# Patient Record
Sex: MALE | Race: WHITE | NOT HISPANIC OR LATINO | Employment: FULL TIME | URBAN - METROPOLITAN AREA
[De-identification: names, ages, dates, MRNs, and addresses within clinical notes are randomized per-mention and may not be internally consistent; named-entity substitution may affect disease eponyms.]

---

## 2017-02-03 ENCOUNTER — HOSPITAL ENCOUNTER (OUTPATIENT)
Dept: CT IMAGING | Facility: HOSPITAL | Age: 47
Discharge: HOME | End: 2017-02-03
Attending: INTERNAL MEDICINE | Admitting: INTERNAL MEDICINE

## 2017-02-08 ENCOUNTER — HOSPITAL ENCOUNTER (OUTPATIENT)
Dept: WOUND CARE | Facility: HOSPITAL | Age: 47
Discharge: HOME | End: 2017-02-08
Attending: SURGERY | Admitting: SURGERY

## 2017-02-15 ENCOUNTER — HOSPITAL ENCOUNTER (OUTPATIENT)
Dept: SLEEP MEDICINE | Facility: HOSPITAL | Age: 47
Discharge: HOME | End: 2017-02-15
Attending: INTERNAL MEDICINE | Admitting: INTERNAL MEDICINE

## 2020-01-16 ENCOUNTER — APPOINTMENT (OUTPATIENT)
Dept: GENERAL RADIOLOGY | Facility: HOSPITAL | Age: 50
End: 2020-01-16

## 2020-01-16 ENCOUNTER — HOSPITAL ENCOUNTER (OUTPATIENT)
Facility: HOSPITAL | Age: 50
Setting detail: OBSERVATION
Discharge: HOME | End: 2020-01-19
Attending: INTERNAL MEDICINE | Admitting: INTERNAL MEDICINE

## 2020-01-16 DIAGNOSIS — R50.9 FEVER, UNSPECIFIED FEVER CAUSE: Primary | ICD-10-CM

## 2020-01-16 DIAGNOSIS — L03.116 LEFT LEG CELLULITIS: ICD-10-CM

## 2020-01-16 DIAGNOSIS — D72.829 LEUKOCYTOSIS, UNSPECIFIED TYPE: ICD-10-CM

## 2020-01-16 DIAGNOSIS — Z86.14 HISTORY OF MRSA INFECTION: ICD-10-CM

## 2020-01-16 PROBLEM — J44.9 COPD (CHRONIC OBSTRUCTIVE PULMONARY DISEASE) (HCC): Chronic | Status: ACTIVE | Noted: 2020-01-16

## 2020-01-16 PROBLEM — E55.9 VITAMIN D DEFICIENCY: Status: ACTIVE | Noted: 2017-03-03

## 2020-01-16 PROBLEM — G47.33 OBSTRUCTIVE SLEEP APNEA SYNDROME: Status: ACTIVE | Noted: 2020-01-16

## 2020-01-16 PROBLEM — E11.9 TYPE 2 DIABETES MELLITUS: Chronic | Status: ACTIVE | Noted: 2020-01-16

## 2020-01-16 PROBLEM — I10 BENIGN ESSENTIAL HYPERTENSION: Chronic | Status: ACTIVE | Noted: 2020-01-16

## 2020-01-16 PROBLEM — E55.9 VITAMIN D DEFICIENCY: Chronic | Status: ACTIVE | Noted: 2017-03-03

## 2020-01-16 PROBLEM — E66.9 OBESITY (BMI 30-39.9): Chronic | Status: ACTIVE | Noted: 2020-01-16

## 2020-01-16 PROBLEM — S91.105A OPEN WOUND OF FIFTH TOE OF LEFT FOOT: Status: ACTIVE | Noted: 2020-01-16

## 2020-01-16 PROBLEM — L03.90 CELLULITIS: Status: ACTIVE | Noted: 2020-01-16

## 2020-01-16 PROBLEM — I10 BENIGN ESSENTIAL HYPERTENSION: Status: ACTIVE | Noted: 2020-01-16

## 2020-01-16 PROBLEM — F39 MOOD DISORDER (HCC): Status: ACTIVE | Noted: 2017-03-06

## 2020-01-16 PROBLEM — F39 MOOD DISORDER (HCC): Chronic | Status: ACTIVE | Noted: 2017-03-06

## 2020-01-16 PROBLEM — G47.33 OBSTRUCTIVE SLEEP APNEA SYNDROME: Chronic | Status: ACTIVE | Noted: 2020-01-16

## 2020-01-16 PROBLEM — E11.9 TYPE 2 DIABETES MELLITUS (HCC): Status: ACTIVE | Noted: 2020-01-16

## 2020-01-16 LAB
ALBUMIN SERPL-MCNC: 3.6 G/DL (ref 3.5–5.2)
ALBUMIN/GLOB SERPL: 1.2 G/DL
ALP SERPL-CCNC: 68 U/L (ref 39–117)
ALT SERPL W P-5'-P-CCNC: 13 U/L (ref 1–41)
ANION GAP SERPL CALCULATED.3IONS-SCNC: 11 MMOL/L (ref 5–15)
AST SERPL-CCNC: 17 U/L (ref 1–40)
BASOPHILS # BLD AUTO: 0 10*3/MM3 (ref 0–0.2)
BASOPHILS NFR BLD AUTO: 0 % (ref 0–1.5)
BILIRUB SERPL-MCNC: 1.4 MG/DL (ref 0.2–1.2)
BUN BLD-MCNC: 18 MG/DL (ref 6–20)
BUN/CREAT SERPL: 14.9 (ref 7–25)
CALCIUM SPEC-SCNC: 8.6 MG/DL (ref 8.6–10.5)
CHLORIDE SERPL-SCNC: 98 MMOL/L (ref 98–107)
CO2 SERPL-SCNC: 26 MMOL/L (ref 22–29)
CREAT BLD-MCNC: 1.21 MG/DL (ref 0.76–1.27)
CRP SERPL-MCNC: 13.32 MG/DL (ref 0–0.5)
D-LACTATE SERPL-SCNC: 0.9 MMOL/L (ref 0.5–2)
D-LACTATE SERPL-SCNC: 1.7 MMOL/L (ref 0.5–2)
DEPRECATED RDW RBC AUTO: 43.8 FL (ref 37–54)
EOSINOPHIL # BLD AUTO: 0 10*3/MM3 (ref 0–0.4)
EOSINOPHIL NFR BLD AUTO: 0 % (ref 0.3–6.2)
ERYTHROCYTE [DISTWIDTH] IN BLOOD BY AUTOMATED COUNT: 15 % (ref 12.3–15.4)
ERYTHROCYTE [SEDIMENTATION RATE] IN BLOOD: 40 MM/HR (ref 0–15)
FLUAV SUBTYP SPEC NAA+PROBE: NOT DETECTED
FLUBV RNA ISLT QL NAA+PROBE: NOT DETECTED
GFR SERPL CREATININE-BSD FRML MDRD: 64 ML/MIN/1.73
GLOBULIN UR ELPH-MCNC: 2.9 GM/DL
GLUCOSE BLD-MCNC: 201 MG/DL (ref 65–99)
GLUCOSE BLDC GLUCOMTR-MCNC: 273 MG/DL (ref 70–105)
HCT VFR BLD AUTO: 43.4 % (ref 37.5–51)
HGB BLD-MCNC: 14.6 G/DL (ref 13–17.7)
HOLD SPECIMEN: NORMAL
HOLD SPECIMEN: NORMAL
LYMPHOCYTES # BLD AUTO: 0.8 10*3/MM3 (ref 0.7–3.1)
LYMPHOCYTES NFR BLD AUTO: 4.5 % (ref 19.6–45.3)
MCH RBC QN AUTO: 27.4 PG (ref 26.6–33)
MCHC RBC AUTO-ENTMCNC: 33.6 G/DL (ref 31.5–35.7)
MCV RBC AUTO: 81.5 FL (ref 79–97)
MONOCYTES # BLD AUTO: 0.9 10*3/MM3 (ref 0.1–0.9)
MONOCYTES NFR BLD AUTO: 5.2 % (ref 5–12)
NEUTROPHILS # BLD AUTO: 15.3 10*3/MM3 (ref 1.7–7)
NEUTROPHILS NFR BLD AUTO: 90.3 % (ref 42.7–76)
NRBC BLD AUTO-RTO: 0.2 /100 WBC (ref 0–0.2)
PLATELET # BLD AUTO: 171 10*3/MM3 (ref 140–450)
PMV BLD AUTO: 8.2 FL (ref 6–12)
POTASSIUM BLD-SCNC: 4.5 MMOL/L (ref 3.5–5.2)
PROT SERPL-MCNC: 6.5 G/DL (ref 6–8.5)
RBC # BLD AUTO: 5.32 10*6/MM3 (ref 4.14–5.8)
SODIUM BLD-SCNC: 135 MMOL/L (ref 136–145)
WBC NRBC COR # BLD: 16.9 10*3/MM3 (ref 3.4–10.8)
WHOLE BLOOD HOLD SPECIMEN: NORMAL
WHOLE BLOOD HOLD SPECIMEN: NORMAL

## 2020-01-16 PROCEDURE — 82962 GLUCOSE BLOOD TEST: CPT

## 2020-01-16 PROCEDURE — 99219 PR INITIAL OBSERVATION CARE/DAY 50 MINUTES: CPT | Performed by: INTERNAL MEDICINE

## 2020-01-16 PROCEDURE — 96367 TX/PROPH/DG ADDL SEQ IV INF: CPT

## 2020-01-16 PROCEDURE — 86140 C-REACTIVE PROTEIN: CPT | Performed by: NURSE PRACTITIONER

## 2020-01-16 PROCEDURE — 85652 RBC SED RATE AUTOMATED: CPT | Performed by: NURSE PRACTITIONER

## 2020-01-16 PROCEDURE — 80053 COMPREHEN METABOLIC PANEL: CPT | Performed by: NURSE PRACTITIONER

## 2020-01-16 PROCEDURE — 93005 ELECTROCARDIOGRAM TRACING: CPT | Performed by: NURSE PRACTITIONER

## 2020-01-16 PROCEDURE — 83605 ASSAY OF LACTIC ACID: CPT

## 2020-01-16 PROCEDURE — G0378 HOSPITAL OBSERVATION PER HR: HCPCS

## 2020-01-16 PROCEDURE — 25010000002 ONDANSETRON PER 1 MG: Performed by: NURSE PRACTITIONER

## 2020-01-16 PROCEDURE — 71045 X-RAY EXAM CHEST 1 VIEW: CPT

## 2020-01-16 PROCEDURE — 83605 ASSAY OF LACTIC ACID: CPT | Performed by: NURSE PRACTITIONER

## 2020-01-16 PROCEDURE — 96375 TX/PRO/DX INJ NEW DRUG ADDON: CPT

## 2020-01-16 PROCEDURE — 36415 COLL VENOUS BLD VENIPUNCTURE: CPT

## 2020-01-16 PROCEDURE — 87502 INFLUENZA DNA AMP PROBE: CPT | Performed by: NURSE PRACTITIONER

## 2020-01-16 PROCEDURE — 99284 EMERGENCY DEPT VISIT MOD MDM: CPT

## 2020-01-16 PROCEDURE — 87040 BLOOD CULTURE FOR BACTERIA: CPT | Performed by: NURSE PRACTITIONER

## 2020-01-16 PROCEDURE — 73502 X-RAY EXAM HIP UNI 2-3 VIEWS: CPT

## 2020-01-16 PROCEDURE — 63710000001 INSULIN LISPRO (HUMAN) PER 5 UNITS: Performed by: NURSE PRACTITIONER

## 2020-01-16 PROCEDURE — 25010000002 MORPHINE PER 10 MG: Performed by: NURSE PRACTITIONER

## 2020-01-16 PROCEDURE — 85025 COMPLETE CBC W/AUTO DIFF WBC: CPT | Performed by: NURSE PRACTITIONER

## 2020-01-16 PROCEDURE — 83036 HEMOGLOBIN GLYCOSYLATED A1C: CPT | Performed by: NURSE PRACTITIONER

## 2020-01-16 PROCEDURE — 83735 ASSAY OF MAGNESIUM: CPT | Performed by: NURSE PRACTITIONER

## 2020-01-16 PROCEDURE — 25010000002 VANCOMYCIN 10 G RECONSTITUTED SOLUTION: Performed by: NURSE PRACTITIONER

## 2020-01-16 PROCEDURE — 25010000002 PIPERACILLIN SOD-TAZOBACTAM PER 1 G: Performed by: NURSE PRACTITIONER

## 2020-01-16 PROCEDURE — 96365 THER/PROPH/DIAG IV INF INIT: CPT

## 2020-01-16 RX ORDER — BUDESONIDE AND FORMOTEROL FUMARATE DIHYDRATE 160; 4.5 UG/1; UG/1
1 AEROSOL RESPIRATORY (INHALATION) 2 TIMES DAILY PRN
Status: DISCONTINUED | OUTPATIENT
Start: 2020-01-16 | End: 2020-01-17

## 2020-01-16 RX ORDER — ONDANSETRON 2 MG/ML
4 INJECTION INTRAMUSCULAR; INTRAVENOUS ONCE
Status: COMPLETED | OUTPATIENT
Start: 2020-01-16 | End: 2020-01-16

## 2020-01-16 RX ORDER — BUDESONIDE AND FORMOTEROL FUMARATE DIHYDRATE 160; 4.5 UG/1; UG/1
1 AEROSOL RESPIRATORY (INHALATION) 2 TIMES DAILY PRN
COMMUNITY
Start: 2016-05-19

## 2020-01-16 RX ORDER — SODIUM CHLORIDE 0.9 % (FLUSH) 0.9 %
10 SYRINGE (ML) INJECTION AS NEEDED
Status: DISCONTINUED | OUTPATIENT
Start: 2020-01-16 | End: 2020-01-19 | Stop reason: HOSPADM

## 2020-01-16 RX ORDER — NICOTINE POLACRILEX 4 MG
15 LOZENGE BUCCAL
Status: DISCONTINUED | OUTPATIENT
Start: 2020-01-16 | End: 2020-01-19 | Stop reason: HOSPADM

## 2020-01-16 RX ORDER — MONTELUKAST SODIUM 10 MG/1
10 TABLET ORAL DAILY
COMMUNITY
Start: 2014-06-27 | End: 2020-01-16

## 2020-01-16 RX ORDER — ACETAMINOPHEN 325 MG/1
650 TABLET ORAL EVERY 4 HOURS PRN
Status: DISCONTINUED | OUTPATIENT
Start: 2020-01-16 | End: 2020-01-19 | Stop reason: HOSPADM

## 2020-01-16 RX ORDER — VANCOMYCIN HYDROCHLORIDE
15 EVERY 12 HOURS
Status: DISCONTINUED | OUTPATIENT
Start: 2020-01-17 | End: 2020-01-16

## 2020-01-16 RX ORDER — BISACODYL 10 MG
10 SUPPOSITORY, RECTAL RECTAL DAILY PRN
Status: DISCONTINUED | OUTPATIENT
Start: 2020-01-16 | End: 2020-01-19 | Stop reason: HOSPADM

## 2020-01-16 RX ORDER — ALUMINA, MAGNESIA, AND SIMETHICONE 2400; 2400; 240 MG/30ML; MG/30ML; MG/30ML
15 SUSPENSION ORAL EVERY 6 HOURS PRN
Status: DISCONTINUED | OUTPATIENT
Start: 2020-01-16 | End: 2020-01-19 | Stop reason: HOSPADM

## 2020-01-16 RX ORDER — ONDANSETRON 2 MG/ML
4 INJECTION INTRAMUSCULAR; INTRAVENOUS EVERY 6 HOURS PRN
Status: DISCONTINUED | OUTPATIENT
Start: 2020-01-16 | End: 2020-01-19 | Stop reason: HOSPADM

## 2020-01-16 RX ORDER — SODIUM CHLORIDE 0.9 % (FLUSH) 0.9 %
10 SYRINGE (ML) INJECTION EVERY 12 HOURS SCHEDULED
Status: DISCONTINUED | OUTPATIENT
Start: 2020-01-16 | End: 2020-01-19 | Stop reason: HOSPADM

## 2020-01-16 RX ORDER — MODAFINIL 100 MG/1
100 TABLET ORAL DAILY
COMMUNITY

## 2020-01-16 RX ORDER — MAGNESIUM SULFATE 1 G/100ML
1 INJECTION INTRAVENOUS AS NEEDED
Status: DISCONTINUED | OUTPATIENT
Start: 2020-01-16 | End: 2020-01-19 | Stop reason: HOSPADM

## 2020-01-16 RX ORDER — MORPHINE SULFATE 4 MG/ML
4 INJECTION, SOLUTION INTRAMUSCULAR; INTRAVENOUS ONCE
Status: COMPLETED | OUTPATIENT
Start: 2020-01-16 | End: 2020-01-16

## 2020-01-16 RX ORDER — LISINOPRIL AND HYDROCHLOROTHIAZIDE 12.5; 1 MG/1; MG/1
1 TABLET ORAL DAILY
COMMUNITY
End: 2022-01-03

## 2020-01-16 RX ORDER — POTASSIUM CHLORIDE 20 MEQ/1
40 TABLET, EXTENDED RELEASE ORAL AS NEEDED
Status: DISCONTINUED | OUTPATIENT
Start: 2020-01-16 | End: 2020-01-19 | Stop reason: HOSPADM

## 2020-01-16 RX ORDER — VANCOMYCIN 1.75 GRAM/500 ML IN 0.9 % SODIUM CHLORIDE INTRAVENOUS
1750 EVERY 12 HOURS
Status: DISCONTINUED | OUTPATIENT
Start: 2020-01-16 | End: 2020-01-18

## 2020-01-16 RX ORDER — DEXTROSE MONOHYDRATE 25 G/50ML
25 INJECTION, SOLUTION INTRAVENOUS
Status: DISCONTINUED | OUTPATIENT
Start: 2020-01-16 | End: 2020-01-19 | Stop reason: HOSPADM

## 2020-01-16 RX ORDER — ACETAMINOPHEN 650 MG/1
650 SUPPOSITORY RECTAL EVERY 4 HOURS PRN
Status: DISCONTINUED | OUTPATIENT
Start: 2020-01-16 | End: 2020-01-19 | Stop reason: HOSPADM

## 2020-01-16 RX ORDER — ONDANSETRON 4 MG/1
4 TABLET, FILM COATED ORAL EVERY 6 HOURS PRN
Status: DISCONTINUED | OUTPATIENT
Start: 2020-01-16 | End: 2020-01-19 | Stop reason: HOSPADM

## 2020-01-16 RX ORDER — ACETAMINOPHEN, ASPIRIN AND CAFFEINE 250; 250; 65 MG/1; MG/1; MG/1
2 TABLET, FILM COATED ORAL EVERY 6 HOURS PRN
COMMUNITY

## 2020-01-16 RX ORDER — MODAFINIL 100 MG/1
100 TABLET ORAL DAILY
Status: DISCONTINUED | OUTPATIENT
Start: 2020-01-17 | End: 2020-01-19 | Stop reason: HOSPADM

## 2020-01-16 RX ORDER — ACETAMINOPHEN 160 MG/5ML
650 SOLUTION ORAL EVERY 4 HOURS PRN
Status: DISCONTINUED | OUTPATIENT
Start: 2020-01-16 | End: 2020-01-19 | Stop reason: HOSPADM

## 2020-01-16 RX ORDER — HYDROCODONE BITARTRATE AND ACETAMINOPHEN 5; 325 MG/1; MG/1
1 TABLET ORAL EVERY 6 HOURS PRN
Status: DISCONTINUED | OUTPATIENT
Start: 2020-01-16 | End: 2020-01-19 | Stop reason: HOSPADM

## 2020-01-16 RX ORDER — MAGNESIUM SULFATE HEPTAHYDRATE 40 MG/ML
2 INJECTION, SOLUTION INTRAVENOUS AS NEEDED
Status: DISCONTINUED | OUTPATIENT
Start: 2020-01-16 | End: 2020-01-19 | Stop reason: HOSPADM

## 2020-01-16 RX ORDER — CHOLECALCIFEROL (VITAMIN D3) 125 MCG
5 CAPSULE ORAL NIGHTLY PRN
Status: DISCONTINUED | OUTPATIENT
Start: 2020-01-16 | End: 2020-01-19 | Stop reason: HOSPADM

## 2020-01-16 RX ADMIN — MORPHINE SULFATE 4 MG: 4 INJECTION INTRAVENOUS at 17:07

## 2020-01-16 RX ADMIN — VANCOMYCIN HYDROCHLORIDE 1750 MG: 10 INJECTION, POWDER, LYOPHILIZED, FOR SOLUTION INTRAVENOUS at 17:10

## 2020-01-16 RX ADMIN — INSULIN LISPRO 6 UNITS: 100 INJECTION, SOLUTION INTRAVENOUS; SUBCUTANEOUS at 21:00

## 2020-01-16 RX ADMIN — PIPERACILLIN AND TAZOBACTAM 3.38 G: 3; .375 INJECTION, POWDER, LYOPHILIZED, FOR SOLUTION INTRAVENOUS at 15:02

## 2020-01-16 RX ADMIN — ONDANSETRON 4 MG: 2 INJECTION INTRAMUSCULAR; INTRAVENOUS at 14:27

## 2020-01-16 RX ADMIN — Medication 10 ML: at 20:43

## 2020-01-16 RX ADMIN — SODIUM CHLORIDE 1000 ML: 900 INJECTION, SOLUTION INTRAVENOUS at 14:26

## 2020-01-16 RX ADMIN — ACETAMINOPHEN 650 MG: 325 TABLET, FILM COATED ORAL at 19:44

## 2020-01-17 ENCOUNTER — APPOINTMENT (OUTPATIENT)
Dept: MRI IMAGING | Facility: HOSPITAL | Age: 50
End: 2020-01-17

## 2020-01-17 LAB
ANION GAP SERPL CALCULATED.3IONS-SCNC: 11 MMOL/L (ref 5–15)
BASOPHILS # BLD AUTO: 0.1 10*3/MM3 (ref 0–0.2)
BASOPHILS NFR BLD AUTO: 0.5 % (ref 0–1.5)
BUN BLD-MCNC: 20 MG/DL (ref 6–20)
BUN/CREAT SERPL: 15.6 (ref 7–25)
CALCIUM SPEC-SCNC: 8.9 MG/DL (ref 8.6–10.5)
CHLORIDE SERPL-SCNC: 101 MMOL/L (ref 98–107)
CO2 SERPL-SCNC: 27 MMOL/L (ref 22–29)
CREAT BLD-MCNC: 1.28 MG/DL (ref 0.76–1.27)
DEPRECATED RDW RBC AUTO: 43.8 FL (ref 37–54)
EOSINOPHIL # BLD AUTO: 0.1 10*3/MM3 (ref 0–0.4)
EOSINOPHIL NFR BLD AUTO: 0.7 % (ref 0.3–6.2)
ERYTHROCYTE [DISTWIDTH] IN BLOOD BY AUTOMATED COUNT: 15.1 % (ref 12.3–15.4)
GFR SERPL CREATININE-BSD FRML MDRD: 60 ML/MIN/1.73
GLUCOSE BLD-MCNC: 173 MG/DL (ref 65–99)
GLUCOSE BLDC GLUCOMTR-MCNC: 182 MG/DL (ref 70–105)
GLUCOSE BLDC GLUCOMTR-MCNC: 237 MG/DL (ref 70–105)
GLUCOSE BLDC GLUCOMTR-MCNC: 263 MG/DL (ref 70–105)
HBA1C MFR BLD: 8 % (ref 3.5–5.6)
HCT VFR BLD AUTO: 40.4 % (ref 37.5–51)
HGB BLD-MCNC: 13.5 G/DL (ref 13–17.7)
LYMPHOCYTES # BLD AUTO: 1 10*3/MM3 (ref 0.7–3.1)
LYMPHOCYTES NFR BLD AUTO: 8.3 % (ref 19.6–45.3)
MAGNESIUM SERPL-MCNC: 1.5 MG/DL (ref 1.6–2.6)
MCH RBC QN AUTO: 27.3 PG (ref 26.6–33)
MCHC RBC AUTO-ENTMCNC: 33.4 G/DL (ref 31.5–35.7)
MCV RBC AUTO: 81.9 FL (ref 79–97)
MONOCYTES # BLD AUTO: 1.4 10*3/MM3 (ref 0.1–0.9)
MONOCYTES NFR BLD AUTO: 11 % (ref 5–12)
NEUTROPHILS # BLD AUTO: 9.8 10*3/MM3 (ref 1.7–7)
NEUTROPHILS NFR BLD AUTO: 79.5 % (ref 42.7–76)
NRBC BLD AUTO-RTO: 0.1 /100 WBC (ref 0–0.2)
PLATELET # BLD AUTO: 143 10*3/MM3 (ref 140–450)
PMV BLD AUTO: 8.2 FL (ref 6–12)
POTASSIUM BLD-SCNC: 4.6 MMOL/L (ref 3.5–5.2)
RBC # BLD AUTO: 4.93 10*6/MM3 (ref 4.14–5.8)
SODIUM BLD-SCNC: 139 MMOL/L (ref 136–145)
TROPONIN T SERPL-MCNC: <0.01 NG/ML (ref 0–0.03)
WBC NRBC COR # BLD: 12.3 10*3/MM3 (ref 3.4–10.8)

## 2020-01-17 PROCEDURE — 94799 UNLISTED PULMONARY SVC/PX: CPT

## 2020-01-17 PROCEDURE — A9576 INJ PROHANCE MULTIPACK: HCPCS | Performed by: INTERNAL MEDICINE

## 2020-01-17 PROCEDURE — 99225 PR SBSQ OBSERVATION CARE/DAY 25 MINUTES: CPT | Performed by: INTERNAL MEDICINE

## 2020-01-17 PROCEDURE — 94664 DEMO&/EVAL PT USE INHALER: CPT

## 2020-01-17 PROCEDURE — 94640 AIRWAY INHALATION TREATMENT: CPT

## 2020-01-17 PROCEDURE — 82962 GLUCOSE BLOOD TEST: CPT

## 2020-01-17 PROCEDURE — 84484 ASSAY OF TROPONIN QUANT: CPT | Performed by: INTERNAL MEDICINE

## 2020-01-17 PROCEDURE — 85025 COMPLETE CBC W/AUTO DIFF WBC: CPT | Performed by: NURSE PRACTITIONER

## 2020-01-17 PROCEDURE — 25010000002 VANCOMYCIN 10 G RECONSTITUTED SOLUTION: Performed by: NURSE PRACTITIONER

## 2020-01-17 PROCEDURE — 25010000002 CEFEPIME PER 500 MG: Performed by: INTERNAL MEDICINE

## 2020-01-17 PROCEDURE — 80048 BASIC METABOLIC PNL TOTAL CA: CPT | Performed by: NURSE PRACTITIONER

## 2020-01-17 PROCEDURE — G0378 HOSPITAL OBSERVATION PER HR: HCPCS

## 2020-01-17 PROCEDURE — 73720 MRI LWR EXTREMITY W/O&W/DYE: CPT

## 2020-01-17 PROCEDURE — 25010000002 GADOTERIDOL PER 1 ML: Performed by: INTERNAL MEDICINE

## 2020-01-17 PROCEDURE — 63710000001 INSULIN GLARGINE PER 5 UNITS: Performed by: INTERNAL MEDICINE

## 2020-01-17 PROCEDURE — 63710000001 INSULIN LISPRO (HUMAN) PER 5 UNITS: Performed by: NURSE PRACTITIONER

## 2020-01-17 PROCEDURE — 25010000002 ENOXAPARIN PER 10 MG: Performed by: NURSE PRACTITIONER

## 2020-01-17 PROCEDURE — 0099U HC BIOFIRE FILMARRAY RESP PANEL 1: CPT | Performed by: NURSE PRACTITIONER

## 2020-01-17 PROCEDURE — 96372 THER/PROPH/DIAG INJ SC/IM: CPT

## 2020-01-17 PROCEDURE — 96367 TX/PROPH/DG ADDL SEQ IV INF: CPT

## 2020-01-17 RX ORDER — LISINOPRIL 5 MG/1
10 TABLET ORAL
Status: DISCONTINUED | OUTPATIENT
Start: 2020-01-18 | End: 2020-01-19 | Stop reason: HOSPADM

## 2020-01-17 RX ORDER — BUDESONIDE AND FORMOTEROL FUMARATE DIHYDRATE 160; 4.5 UG/1; UG/1
2 AEROSOL RESPIRATORY (INHALATION)
Status: DISCONTINUED | OUTPATIENT
Start: 2020-01-17 | End: 2020-01-19 | Stop reason: HOSPADM

## 2020-01-17 RX ORDER — CLINDAMYCIN PHOSPHATE 900 MG/50ML
900 INJECTION, SOLUTION INTRAVENOUS EVERY 8 HOURS
Status: DISCONTINUED | OUTPATIENT
Start: 2020-01-17 | End: 2020-01-19

## 2020-01-17 RX ORDER — INSULIN GLARGINE 100 [IU]/ML
6 INJECTION, SOLUTION SUBCUTANEOUS NIGHTLY
Status: DISCONTINUED | OUTPATIENT
Start: 2020-01-17 | End: 2020-01-19 | Stop reason: HOSPADM

## 2020-01-17 RX ADMIN — BUDESONIDE AND FORMOTEROL FUMARATE DIHYDRATE 2 PUFF: 160; 4.5 AEROSOL RESPIRATORY (INHALATION) at 10:09

## 2020-01-17 RX ADMIN — HYDROCODONE BITARTRATE AND ACETAMINOPHEN 1 TABLET: 5; 325 TABLET ORAL at 19:05

## 2020-01-17 RX ADMIN — MODAFINIL 100 MG: 100 TABLET ORAL at 08:26

## 2020-01-17 RX ADMIN — LISINOPRIL: 20 TABLET ORAL at 08:26

## 2020-01-17 RX ADMIN — GADOTERIDOL 20 ML: 279.3 INJECTION, SOLUTION INTRAVENOUS at 12:00

## 2020-01-17 RX ADMIN — HYDROCODONE BITARTRATE AND ACETAMINOPHEN 1 TABLET: 5; 325 TABLET ORAL at 13:27

## 2020-01-17 RX ADMIN — Medication 10 ML: at 20:31

## 2020-01-17 RX ADMIN — INSULIN LISPRO 8 UNITS: 100 INJECTION, SOLUTION INTRAVENOUS; SUBCUTANEOUS at 18:13

## 2020-01-17 RX ADMIN — ENOXAPARIN SODIUM 40 MG: 40 INJECTION SUBCUTANEOUS at 17:33

## 2020-01-17 RX ADMIN — INSULIN LISPRO 2 UNITS: 100 INJECTION, SOLUTION INTRAVENOUS; SUBCUTANEOUS at 13:15

## 2020-01-17 RX ADMIN — INSULIN GLARGINE 6 UNITS: 100 INJECTION, SOLUTION SUBCUTANEOUS at 20:29

## 2020-01-17 RX ADMIN — VANCOMYCIN HYDROCHLORIDE 1750 MG: 10 INJECTION, POWDER, LYOPHILIZED, FOR SOLUTION INTRAVENOUS at 05:59

## 2020-01-17 RX ADMIN — Medication 10 ML: at 08:26

## 2020-01-17 RX ADMIN — INSULIN LISPRO 6 UNITS: 100 INJECTION, SOLUTION INTRAVENOUS; SUBCUTANEOUS at 20:29

## 2020-01-17 RX ADMIN — BUDESONIDE AND FORMOTEROL FUMARATE DIHYDRATE 2 PUFF: 160; 4.5 AEROSOL RESPIRATORY (INHALATION) at 19:09

## 2020-01-17 RX ADMIN — VANCOMYCIN HYDROCHLORIDE 1750 MG: 10 INJECTION, POWDER, LYOPHILIZED, FOR SOLUTION INTRAVENOUS at 19:01

## 2020-01-17 RX ADMIN — CEFEPIME HYDROCHLORIDE 2 G: 2 INJECTION, POWDER, FOR SOLUTION INTRAVENOUS at 13:15

## 2020-01-17 RX ADMIN — INSULIN LISPRO 4 UNITS: 100 INJECTION, SOLUTION INTRAVENOUS; SUBCUTANEOUS at 08:25

## 2020-01-17 RX ADMIN — HYDROCODONE BITARTRATE AND ACETAMINOPHEN 1 TABLET: 5; 325 TABLET ORAL at 06:03

## 2020-01-17 RX ADMIN — CLINDAMYCIN PHOSPHATE 900 MG: 900 INJECTION, SOLUTION INTRAVENOUS at 21:18

## 2020-01-18 ENCOUNTER — APPOINTMENT (OUTPATIENT)
Dept: CARDIOLOGY | Facility: HOSPITAL | Age: 50
End: 2020-01-18

## 2020-01-18 LAB
ANION GAP SERPL CALCULATED.3IONS-SCNC: 9 MMOL/L (ref 5–15)
B PERT DNA SPEC QL NAA+PROBE: NOT DETECTED
BASOPHILS # BLD AUTO: 0.1 10*3/MM3 (ref 0–0.2)
BASOPHILS NFR BLD AUTO: 1 % (ref 0–1.5)
BH CV LOWER VASCULAR LEFT COMMON FEMORAL AUGMENT: NORMAL
BH CV LOWER VASCULAR LEFT COMMON FEMORAL COMPETENT: NORMAL
BH CV LOWER VASCULAR LEFT COMMON FEMORAL COMPRESS: NORMAL
BH CV LOWER VASCULAR LEFT COMMON FEMORAL PHASIC: NORMAL
BH CV LOWER VASCULAR LEFT COMMON FEMORAL SPONT: NORMAL
BH CV LOWER VASCULAR LEFT DISTAL FEMORAL COMPRESS: NORMAL
BH CV LOWER VASCULAR LEFT GASTRONEMIUS COMPRESS: NORMAL
BH CV LOWER VASCULAR LEFT GREATER SAPH AK COMPRESS: NORMAL
BH CV LOWER VASCULAR LEFT GREATER SAPH BK COMPRESS: NORMAL
BH CV LOWER VASCULAR LEFT LESSER SAPH COMPRESS: NORMAL
BH CV LOWER VASCULAR LEFT MID FEMORAL AUGMENT: NORMAL
BH CV LOWER VASCULAR LEFT MID FEMORAL COMPETENT: NORMAL
BH CV LOWER VASCULAR LEFT MID FEMORAL COMPRESS: NORMAL
BH CV LOWER VASCULAR LEFT MID FEMORAL PHASIC: NORMAL
BH CV LOWER VASCULAR LEFT MID FEMORAL SPONT: NORMAL
BH CV LOWER VASCULAR LEFT PERONEAL COMPRESS: NORMAL
BH CV LOWER VASCULAR LEFT POPLITEAL AUGMENT: NORMAL
BH CV LOWER VASCULAR LEFT POPLITEAL COMPETENT: NORMAL
BH CV LOWER VASCULAR LEFT POPLITEAL COMPRESS: NORMAL
BH CV LOWER VASCULAR LEFT POPLITEAL PHASIC: NORMAL
BH CV LOWER VASCULAR LEFT POPLITEAL SPONT: NORMAL
BH CV LOWER VASCULAR LEFT POSTERIOR TIBIAL COMPRESS: NORMAL
BH CV LOWER VASCULAR LEFT PROXIMAL FEMORAL COMPRESS: NORMAL
BH CV LOWER VASCULAR LEFT SAPHENOFEMORAL JUNCTION AUGMENT: NORMAL
BH CV LOWER VASCULAR LEFT SAPHENOFEMORAL JUNCTION COMPETENT: NORMAL
BH CV LOWER VASCULAR LEFT SAPHENOFEMORAL JUNCTION COMPRESS: NORMAL
BH CV LOWER VASCULAR LEFT SAPHENOFEMORAL JUNCTION PHASIC: NORMAL
BH CV LOWER VASCULAR LEFT SAPHENOFEMORAL JUNCTION SPONT: NORMAL
BH CV LOWER VASCULAR RIGHT COMMON FEMORAL AUGMENT: NORMAL
BH CV LOWER VASCULAR RIGHT COMMON FEMORAL COMPETENT: NORMAL
BH CV LOWER VASCULAR RIGHT COMMON FEMORAL COMPRESS: NORMAL
BH CV LOWER VASCULAR RIGHT COMMON FEMORAL PHASIC: NORMAL
BH CV LOWER VASCULAR RIGHT COMMON FEMORAL SPONT: NORMAL
BUN BLD-MCNC: 19 MG/DL (ref 6–20)
BUN/CREAT SERPL: 15.4 (ref 7–25)
C PNEUM DNA NPH QL NAA+NON-PROBE: NOT DETECTED
CALCIUM SPEC-SCNC: 8.8 MG/DL (ref 8.6–10.5)
CHLORIDE SERPL-SCNC: 103 MMOL/L (ref 98–107)
CO2 SERPL-SCNC: 26 MMOL/L (ref 22–29)
CREAT BLD-MCNC: 1.23 MG/DL (ref 0.76–1.27)
DEPRECATED RDW RBC AUTO: 43.8 FL (ref 37–54)
EOSINOPHIL # BLD AUTO: 0.4 10*3/MM3 (ref 0–0.4)
EOSINOPHIL NFR BLD AUTO: 4.3 % (ref 0.3–6.2)
ERYTHROCYTE [DISTWIDTH] IN BLOOD BY AUTOMATED COUNT: 14.9 % (ref 12.3–15.4)
FLUAV H1 2009 PAND RNA NPH QL NAA+PROBE: NOT DETECTED
FLUAV H1 HA GENE NPH QL NAA+PROBE: NOT DETECTED
FLUAV H3 RNA NPH QL NAA+PROBE: NOT DETECTED
FLUAV SUBTYP SPEC NAA+PROBE: NOT DETECTED
FLUBV RNA ISLT QL NAA+PROBE: NOT DETECTED
GFR SERPL CREATININE-BSD FRML MDRD: 63 ML/MIN/1.73
GLUCOSE BLD-MCNC: 249 MG/DL (ref 65–99)
GLUCOSE BLDC GLUCOMTR-MCNC: 202 MG/DL (ref 70–105)
GLUCOSE BLDC GLUCOMTR-MCNC: 224 MG/DL (ref 70–105)
GLUCOSE BLDC GLUCOMTR-MCNC: 250 MG/DL (ref 70–105)
GLUCOSE BLDC GLUCOMTR-MCNC: 304 MG/DL (ref 70–105)
HADV DNA SPEC NAA+PROBE: NOT DETECTED
HCOV 229E RNA SPEC QL NAA+PROBE: NOT DETECTED
HCOV HKU1 RNA SPEC QL NAA+PROBE: NOT DETECTED
HCOV NL63 RNA SPEC QL NAA+PROBE: DETECTED
HCOV OC43 RNA SPEC QL NAA+PROBE: NOT DETECTED
HCT VFR BLD AUTO: 37.9 % (ref 37.5–51)
HGB BLD-MCNC: 13.2 G/DL (ref 13–17.7)
HMPV RNA NPH QL NAA+NON-PROBE: NOT DETECTED
HPIV1 RNA SPEC QL NAA+PROBE: NOT DETECTED
HPIV2 RNA SPEC QL NAA+PROBE: NOT DETECTED
HPIV3 RNA NPH QL NAA+PROBE: NOT DETECTED
HPIV4 P GENE NPH QL NAA+PROBE: NOT DETECTED
LYMPHOCYTES # BLD AUTO: 1.7 10*3/MM3 (ref 0.7–3.1)
LYMPHOCYTES NFR BLD AUTO: 16.5 % (ref 19.6–45.3)
M PNEUMO IGG SER IA-ACNC: NOT DETECTED
MAGNESIUM SERPL-MCNC: 2 MG/DL (ref 1.6–2.6)
MCH RBC QN AUTO: 28.7 PG (ref 26.6–33)
MCHC RBC AUTO-ENTMCNC: 34.7 G/DL (ref 31.5–35.7)
MCV RBC AUTO: 82.7 FL (ref 79–97)
MONOCYTES # BLD AUTO: 1.6 10*3/MM3 (ref 0.1–0.9)
MONOCYTES NFR BLD AUTO: 15.3 % (ref 5–12)
NEUTROPHILS # BLD AUTO: 6.4 10*3/MM3 (ref 1.7–7)
NEUTROPHILS NFR BLD AUTO: 62.9 % (ref 42.7–76)
NRBC BLD AUTO-RTO: 0.1 /100 WBC (ref 0–0.2)
PLATELET # BLD AUTO: 150 10*3/MM3 (ref 140–450)
PMV BLD AUTO: 8.3 FL (ref 6–12)
POTASSIUM BLD-SCNC: 3.9 MMOL/L (ref 3.5–5.2)
RBC # BLD AUTO: 4.59 10*6/MM3 (ref 4.14–5.8)
RHINOVIRUS RNA SPEC NAA+PROBE: NOT DETECTED
RSV RNA NPH QL NAA+NON-PROBE: NOT DETECTED
SODIUM BLD-SCNC: 138 MMOL/L (ref 136–145)
WBC NRBC COR # BLD: 10.1 10*3/MM3 (ref 3.4–10.8)

## 2020-01-18 PROCEDURE — 80048 BASIC METABOLIC PNL TOTAL CA: CPT | Performed by: NURSE PRACTITIONER

## 2020-01-18 PROCEDURE — 63710000001 INSULIN GLARGINE PER 5 UNITS: Performed by: INTERNAL MEDICINE

## 2020-01-18 PROCEDURE — 94799 UNLISTED PULMONARY SVC/PX: CPT

## 2020-01-18 PROCEDURE — 99225 PR SBSQ OBSERVATION CARE/DAY 25 MINUTES: CPT | Performed by: INTERNAL MEDICINE

## 2020-01-18 PROCEDURE — 82962 GLUCOSE BLOOD TEST: CPT

## 2020-01-18 PROCEDURE — 96366 THER/PROPH/DIAG IV INF ADDON: CPT

## 2020-01-18 PROCEDURE — 25010000002 ENOXAPARIN PER 10 MG: Performed by: NURSE PRACTITIONER

## 2020-01-18 PROCEDURE — 93971 EXTREMITY STUDY: CPT

## 2020-01-18 PROCEDURE — 25010000002 LINEZOLID 600 MG/300ML SOLUTION: Performed by: INTERNAL MEDICINE

## 2020-01-18 PROCEDURE — 83735 ASSAY OF MAGNESIUM: CPT | Performed by: NURSE PRACTITIONER

## 2020-01-18 PROCEDURE — 85025 COMPLETE CBC W/AUTO DIFF WBC: CPT | Performed by: NURSE PRACTITIONER

## 2020-01-18 PROCEDURE — G0378 HOSPITAL OBSERVATION PER HR: HCPCS

## 2020-01-18 PROCEDURE — 96372 THER/PROPH/DIAG INJ SC/IM: CPT

## 2020-01-18 PROCEDURE — 63710000001 INSULIN LISPRO (HUMAN) PER 5 UNITS: Performed by: NURSE PRACTITIONER

## 2020-01-18 PROCEDURE — 25010000002 VANCOMYCIN 10 G RECONSTITUTED SOLUTION: Performed by: NURSE PRACTITIONER

## 2020-01-18 RX ORDER — LINEZOLID 2 MG/ML
600 INJECTION, SOLUTION INTRAVENOUS EVERY 12 HOURS
Status: DISCONTINUED | OUTPATIENT
Start: 2020-01-18 | End: 2020-01-19

## 2020-01-18 RX ADMIN — BUDESONIDE AND FORMOTEROL FUMARATE DIHYDRATE 2 PUFF: 160; 4.5 AEROSOL RESPIRATORY (INHALATION) at 07:01

## 2020-01-18 RX ADMIN — CLINDAMYCIN PHOSPHATE 900 MG: 900 INJECTION, SOLUTION INTRAVENOUS at 12:57

## 2020-01-18 RX ADMIN — ENOXAPARIN SODIUM 40 MG: 40 INJECTION SUBCUTANEOUS at 17:19

## 2020-01-18 RX ADMIN — INSULIN LISPRO 4 UNITS: 100 INJECTION, SOLUTION INTRAVENOUS; SUBCUTANEOUS at 13:08

## 2020-01-18 RX ADMIN — Medication 10 ML: at 09:38

## 2020-01-18 RX ADMIN — MODAFINIL 100 MG: 100 TABLET ORAL at 09:38

## 2020-01-18 RX ADMIN — Medication 10 ML: at 20:26

## 2020-01-18 RX ADMIN — LINEZOLID 600 MG: 600 INJECTION, SOLUTION INTRAVENOUS at 18:49

## 2020-01-18 RX ADMIN — CLINDAMYCIN PHOSPHATE 900 MG: 900 INJECTION, SOLUTION INTRAVENOUS at 03:08

## 2020-01-18 RX ADMIN — MAGNESIUM HYDROXIDE 10 ML: 2400 SUSPENSION ORAL at 20:43

## 2020-01-18 RX ADMIN — HYDROCODONE BITARTRATE AND ACETAMINOPHEN 1 TABLET: 5; 325 TABLET ORAL at 09:37

## 2020-01-18 RX ADMIN — HYDROCODONE BITARTRATE AND ACETAMINOPHEN 1 TABLET: 5; 325 TABLET ORAL at 18:49

## 2020-01-18 RX ADMIN — INSULIN LISPRO 6 UNITS: 100 INJECTION, SOLUTION INTRAVENOUS; SUBCUTANEOUS at 09:36

## 2020-01-18 RX ADMIN — VANCOMYCIN HYDROCHLORIDE 1750 MG: 10 INJECTION, POWDER, LYOPHILIZED, FOR SOLUTION INTRAVENOUS at 04:26

## 2020-01-18 RX ADMIN — LISINOPRIL 10 MG: 5 TABLET ORAL at 09:37

## 2020-01-18 RX ADMIN — CLINDAMYCIN PHOSPHATE 900 MG: 900 INJECTION, SOLUTION INTRAVENOUS at 18:13

## 2020-01-18 RX ADMIN — INSULIN GLARGINE 6 UNITS: 100 INJECTION, SOLUTION SUBCUTANEOUS at 20:26

## 2020-01-18 RX ADMIN — BUDESONIDE AND FORMOTEROL FUMARATE DIHYDRATE 2 PUFF: 160; 4.5 AEROSOL RESPIRATORY (INHALATION) at 19:20

## 2020-01-18 RX ADMIN — INSULIN LISPRO 7 UNITS: 100 INJECTION, SOLUTION INTRAVENOUS; SUBCUTANEOUS at 21:43

## 2020-01-19 VITALS
TEMPERATURE: 97.6 F | OXYGEN SATURATION: 98 % | HEIGHT: 72 IN | WEIGHT: 261.6 LBS | HEART RATE: 74 BPM | SYSTOLIC BLOOD PRESSURE: 134 MMHG | RESPIRATION RATE: 18 BRPM | BODY MASS INDEX: 35.43 KG/M2 | DIASTOLIC BLOOD PRESSURE: 92 MMHG

## 2020-01-19 LAB
BASOPHILS # BLD AUTO: 0.1 10*3/MM3 (ref 0–0.2)
BASOPHILS NFR BLD AUTO: 1.5 % (ref 0–1.5)
DEPRECATED RDW RBC AUTO: 44.6 FL (ref 37–54)
EOSINOPHIL # BLD AUTO: 0.4 10*3/MM3 (ref 0–0.4)
EOSINOPHIL NFR BLD AUTO: 6.2 % (ref 0.3–6.2)
ERYTHROCYTE [DISTWIDTH] IN BLOOD BY AUTOMATED COUNT: 15.1 % (ref 12.3–15.4)
GLUCOSE BLDC GLUCOMTR-MCNC: 239 MG/DL (ref 70–105)
GLUCOSE BLDC GLUCOMTR-MCNC: 299 MG/DL (ref 70–105)
HCT VFR BLD AUTO: 38.1 % (ref 37.5–51)
HGB BLD-MCNC: 12.9 G/DL (ref 13–17.7)
LYMPHOCYTES # BLD AUTO: 1.4 10*3/MM3 (ref 0.7–3.1)
LYMPHOCYTES NFR BLD AUTO: 20.1 % (ref 19.6–45.3)
MAGNESIUM SERPL-MCNC: 2 MG/DL (ref 1.6–2.6)
MCH RBC QN AUTO: 28 PG (ref 26.6–33)
MCHC RBC AUTO-ENTMCNC: 33.8 G/DL (ref 31.5–35.7)
MCV RBC AUTO: 82.9 FL (ref 79–97)
MONOCYTES # BLD AUTO: 0.8 10*3/MM3 (ref 0.1–0.9)
MONOCYTES NFR BLD AUTO: 12.3 % (ref 5–12)
NEUTROPHILS # BLD AUTO: 4.2 10*3/MM3 (ref 1.7–7)
NEUTROPHILS NFR BLD AUTO: 59.9 % (ref 42.7–76)
NRBC BLD AUTO-RTO: 0 /100 WBC (ref 0–0.2)
PLATELET # BLD AUTO: 164 10*3/MM3 (ref 140–450)
PMV BLD AUTO: 8.4 FL (ref 6–12)
POTASSIUM BLD-SCNC: 4.2 MMOL/L (ref 3.5–5.2)
RBC # BLD AUTO: 4.59 10*6/MM3 (ref 4.14–5.8)
WBC NRBC COR # BLD: 6.9 10*3/MM3 (ref 3.4–10.8)

## 2020-01-19 PROCEDURE — 25010000002 LINEZOLID 600 MG/300ML SOLUTION: Performed by: INTERNAL MEDICINE

## 2020-01-19 PROCEDURE — 84132 ASSAY OF SERUM POTASSIUM: CPT | Performed by: NURSE PRACTITIONER

## 2020-01-19 PROCEDURE — 85025 COMPLETE CBC W/AUTO DIFF WBC: CPT | Performed by: INTERNAL MEDICINE

## 2020-01-19 PROCEDURE — 99217 PR OBSERVATION CARE DISCHARGE MANAGEMENT: CPT | Performed by: INTERNAL MEDICINE

## 2020-01-19 PROCEDURE — 83735 ASSAY OF MAGNESIUM: CPT | Performed by: NURSE PRACTITIONER

## 2020-01-19 PROCEDURE — G0378 HOSPITAL OBSERVATION PER HR: HCPCS

## 2020-01-19 PROCEDURE — 63710000001 INSULIN LISPRO (HUMAN) PER 5 UNITS: Performed by: NURSE PRACTITIONER

## 2020-01-19 PROCEDURE — 82962 GLUCOSE BLOOD TEST: CPT

## 2020-01-19 PROCEDURE — 96366 THER/PROPH/DIAG IV INF ADDON: CPT

## 2020-01-19 PROCEDURE — 94799 UNLISTED PULMONARY SVC/PX: CPT

## 2020-01-19 RX ORDER — LINEZOLID 600 MG/1
600 TABLET, FILM COATED ORAL EVERY 12 HOURS SCHEDULED
Qty: 20 TABLET | Refills: 1 | Status: SHIPPED | OUTPATIENT
Start: 2020-01-19 | End: 2020-01-29

## 2020-01-19 RX ORDER — LINEZOLID 600 MG/1
600 TABLET, FILM COATED ORAL EVERY 12 HOURS SCHEDULED
Status: DISCONTINUED | OUTPATIENT
Start: 2020-01-19 | End: 2020-01-19 | Stop reason: HOSPADM

## 2020-01-19 RX ORDER — LINEZOLID 600 MG/1
600 TABLET, FILM COATED ORAL EVERY 12 HOURS SCHEDULED
Qty: 20 TABLET | Refills: 0 | Status: SHIPPED | OUTPATIENT
Start: 2020-01-19 | End: 2020-01-19

## 2020-01-19 RX ORDER — INSULIN GLARGINE 100 [IU]/ML
10 INJECTION, SOLUTION SUBCUTANEOUS NIGHTLY
Qty: 30 ML | Refills: 0 | Status: SHIPPED | OUTPATIENT
Start: 2020-01-19 | End: 2020-04-21

## 2020-01-19 RX ADMIN — LINEZOLID 600 MG: 600 INJECTION, SOLUTION INTRAVENOUS at 05:42

## 2020-01-19 RX ADMIN — MODAFINIL 100 MG: 100 TABLET ORAL at 08:03

## 2020-01-19 RX ADMIN — BUDESONIDE AND FORMOTEROL FUMARATE DIHYDRATE 2 PUFF: 160; 4.5 AEROSOL RESPIRATORY (INHALATION) at 06:34

## 2020-01-19 RX ADMIN — Medication 10 ML: at 08:04

## 2020-01-19 RX ADMIN — INSULIN LISPRO 4 UNITS: 100 INJECTION, SOLUTION INTRAVENOUS; SUBCUTANEOUS at 08:04

## 2020-01-19 RX ADMIN — HYDROCODONE BITARTRATE AND ACETAMINOPHEN 1 TABLET: 5; 325 TABLET ORAL at 08:04

## 2020-01-19 RX ADMIN — CLINDAMYCIN PHOSPHATE 900 MG: 900 INJECTION, SOLUTION INTRAVENOUS at 03:24

## 2020-01-19 RX ADMIN — LISINOPRIL 10 MG: 5 TABLET ORAL at 08:03

## 2020-01-19 RX ADMIN — INSULIN LISPRO 6 UNITS: 100 INJECTION, SOLUTION INTRAVENOUS; SUBCUTANEOUS at 12:13

## 2020-01-19 RX ADMIN — CLINDAMYCIN PHOSPHATE 900 MG: 900 INJECTION, SOLUTION INTRAVENOUS at 11:27

## 2020-01-20 ENCOUNTER — READMISSION MANAGEMENT (OUTPATIENT)
Dept: CALL CENTER | Facility: HOSPITAL | Age: 50
End: 2020-01-20

## 2020-01-20 LAB — GLUCOSE BLDC GLUCOMTR-MCNC: 252 MG/DL (ref 70–105)

## 2020-01-20 PROCEDURE — 93010 ELECTROCARDIOGRAM REPORT: CPT | Performed by: INTERNAL MEDICINE

## 2020-01-21 LAB
BACTERIA SPEC AEROBE CULT: NORMAL
BACTERIA SPEC AEROBE CULT: NORMAL

## 2020-01-21 NOTE — OUTREACH NOTE
Prep Survey      Responses   Facility patient discharged from?  [facility]   Is patient eligible?  Yes   Discharge diagnosis  LLE cellulitis, T2DM (new dx), Hx right BKA   Does the patient have one of the following disease processes/diagnoses(primary or secondary)?  Other   Does the patient have Home health ordered?  No   Is there a DME ordered?  No   Medication alerts for this patient  insulin started - never been on insulin before   Prep survey completed?  Yes          Kendra Mukherjee RN

## 2020-01-22 ENCOUNTER — READMISSION MANAGEMENT (OUTPATIENT)
Dept: CALL CENTER | Facility: HOSPITAL | Age: 50
End: 2020-01-22

## 2020-04-21 ENCOUNTER — APPOINTMENT (OUTPATIENT)
Dept: GENERAL RADIOLOGY | Facility: HOSPITAL | Age: 50
End: 2020-04-21

## 2020-04-21 ENCOUNTER — HOSPITAL ENCOUNTER (OUTPATIENT)
Facility: HOSPITAL | Age: 50
Setting detail: OBSERVATION
Discharge: HOME | End: 2020-04-22
Attending: INTERNAL MEDICINE | Admitting: INTERNAL MEDICINE

## 2020-04-21 ENCOUNTER — APPOINTMENT (OUTPATIENT)
Dept: CT IMAGING | Facility: HOSPITAL | Age: 50
End: 2020-04-21

## 2020-04-21 DIAGNOSIS — R50.9 FEVER, UNSPECIFIED FEVER CAUSE: Primary | ICD-10-CM

## 2020-04-21 DIAGNOSIS — J02.0 STREP PHARYNGITIS: ICD-10-CM

## 2020-04-21 DIAGNOSIS — R31.9 HEMATURIA, UNSPECIFIED TYPE: ICD-10-CM

## 2020-04-21 DIAGNOSIS — R06.00 DYSPNEA, UNSPECIFIED TYPE: ICD-10-CM

## 2020-04-21 DIAGNOSIS — J18.9 PNEUMONIA OF RIGHT LOWER LOBE DUE TO INFECTIOUS ORGANISM: ICD-10-CM

## 2020-04-21 PROBLEM — E11.65 TYPE 2 DIABETES MELLITUS WITH HYPERGLYCEMIA, WITHOUT LONG-TERM CURRENT USE OF INSULIN (HCC): Chronic | Status: ACTIVE | Noted: 2020-01-16

## 2020-04-21 PROBLEM — B95.0 STREPTOCOCCAL INFECTION GROUP A: Status: ACTIVE | Noted: 2020-04-21

## 2020-04-21 PROBLEM — S88.111A BELOW-KNEE AMPUTATION OF RIGHT LOWER EXTREMITY: Chronic | Status: ACTIVE | Noted: 2020-04-21

## 2020-04-21 PROBLEM — A41.9 SEPSIS: Status: ACTIVE | Noted: 2020-04-21

## 2020-04-21 PROBLEM — Z20.822 SUSPECTED COVID-19 VIRUS INFECTION: Status: ACTIVE | Noted: 2020-04-21

## 2020-04-21 PROBLEM — J44.9 COPD (CHRONIC OBSTRUCTIVE PULMONARY DISEASE) (HCC): Status: ACTIVE | Noted: 2020-01-16

## 2020-04-21 LAB
ALBUMIN SERPL-MCNC: 3.8 G/DL (ref 3.5–5.2)
ALBUMIN/GLOB SERPL: 1.1 G/DL
ALP SERPL-CCNC: 83 U/L (ref 39–117)
ALT SERPL W P-5'-P-CCNC: 31 U/L (ref 1–41)
ANION GAP SERPL CALCULATED.3IONS-SCNC: 13 MMOL/L (ref 5–15)
ARTERIAL PATENCY WRIST A: POSITIVE
AST SERPL-CCNC: 36 U/L (ref 1–40)
ATMOSPHERIC PRESS: ABNORMAL MM[HG]
B PERT DNA SPEC QL NAA+PROBE: NOT DETECTED
BACTERIA UR QL AUTO: ABNORMAL /HPF
BASE EXCESS BLDA CALC-SCNC: -1.3 MMOL/L (ref 0–3)
BASOPHILS # BLD AUTO: 0.1 10*3/MM3 (ref 0–0.2)
BASOPHILS NFR BLD AUTO: 1.1 % (ref 0–1.5)
BDY SITE: ABNORMAL
BILIRUB SERPL-MCNC: 1.3 MG/DL (ref 0.2–1.2)
BILIRUB UR QL STRIP: NEGATIVE
BUN BLD-MCNC: 20 MG/DL (ref 6–20)
BUN/CREAT SERPL: 15.7 (ref 7–25)
C PNEUM DNA NPH QL NAA+NON-PROBE: NOT DETECTED
CALCIUM SPEC-SCNC: 9.5 MG/DL (ref 8.6–10.5)
CHLORIDE SERPL-SCNC: 97 MMOL/L (ref 98–107)
CLARITY UR: CLEAR
CO2 BLDA-SCNC: 23.9 MMOL/L (ref 22–29)
CO2 SERPL-SCNC: 21 MMOL/L (ref 22–29)
COLOR UR: YELLOW
CREAT BLD-MCNC: 1.27 MG/DL (ref 0.76–1.27)
CRP SERPL-MCNC: 2.69 MG/DL (ref 0–0.5)
D DIMER PPP FEU-MCNC: 1.21 MCGFEU/ML (ref 0.17–0.59)
D-LACTATE SERPL-SCNC: 1.6 MMOL/L (ref 0.5–2)
DEPRECATED RDW RBC AUTO: 44.2 FL (ref 37–54)
EOSINOPHIL # BLD AUTO: 0 10*3/MM3 (ref 0–0.4)
EOSINOPHIL NFR BLD AUTO: 0.3 % (ref 0.3–6.2)
ERYTHROCYTE [DISTWIDTH] IN BLOOD BY AUTOMATED COUNT: 15.6 % (ref 12.3–15.4)
FERRITIN SERPL-MCNC: 258 NG/ML (ref 30–400)
FLUAV H1 2009 PAND RNA NPH QL NAA+PROBE: NOT DETECTED
FLUAV H1 HA GENE NPH QL NAA+PROBE: NOT DETECTED
FLUAV H3 RNA NPH QL NAA+PROBE: NOT DETECTED
FLUAV SUBTYP SPEC NAA+PROBE: NOT DETECTED
FLUBV RNA ISLT QL NAA+PROBE: NOT DETECTED
GFR SERPL CREATININE-BSD FRML MDRD: 61 ML/MIN/1.73
GLOBULIN UR ELPH-MCNC: 3.5 GM/DL
GLUCOSE BLD-MCNC: 322 MG/DL (ref 65–99)
GLUCOSE BLDC GLUCOMTR-MCNC: 188 MG/DL (ref 70–105)
GLUCOSE UR STRIP-MCNC: ABNORMAL MG/DL
HADV DNA SPEC NAA+PROBE: NOT DETECTED
HCO3 BLDA-SCNC: 22.8 MMOL/L (ref 21–28)
HCOV 229E RNA SPEC QL NAA+PROBE: NOT DETECTED
HCOV HKU1 RNA SPEC QL NAA+PROBE: NOT DETECTED
HCOV NL63 RNA SPEC QL NAA+PROBE: NOT DETECTED
HCOV OC43 RNA SPEC QL NAA+PROBE: NOT DETECTED
HCT VFR BLD AUTO: 43.1 % (ref 37.5–51)
HEMODILUTION: NO
HGB BLD-MCNC: 14.7 G/DL (ref 13–17.7)
HGB UR QL STRIP.AUTO: ABNORMAL
HMPV RNA NPH QL NAA+NON-PROBE: NOT DETECTED
HOROWITZ INDEX BLD+IHG-RTO: 21 %
HPIV1 RNA SPEC QL NAA+PROBE: NOT DETECTED
HPIV2 RNA SPEC QL NAA+PROBE: NOT DETECTED
HPIV3 RNA NPH QL NAA+PROBE: NOT DETECTED
HPIV4 P GENE NPH QL NAA+PROBE: NOT DETECTED
HYALINE CASTS UR QL AUTO: ABNORMAL /LPF
KETONES UR QL STRIP: NEGATIVE
L PNEUMO1 AG UR QL IA: NEGATIVE
LDH SERPL-CCNC: 325 U/L (ref 135–225)
LEUKOCYTE ESTERASE UR QL STRIP.AUTO: NEGATIVE
LIPASE SERPL-CCNC: 67 U/L (ref 13–60)
LYMPHOCYTES # BLD AUTO: 0.8 10*3/MM3 (ref 0.7–3.1)
LYMPHOCYTES NFR BLD AUTO: 6.9 % (ref 19.6–45.3)
M PNEUMO IGG SER IA-ACNC: NOT DETECTED
MCH RBC QN AUTO: 27 PG (ref 26.6–33)
MCHC RBC AUTO-ENTMCNC: 34 G/DL (ref 31.5–35.7)
MCV RBC AUTO: 79.3 FL (ref 79–97)
MODALITY: ABNORMAL
MONOCYTES # BLD AUTO: 0.9 10*3/MM3 (ref 0.1–0.9)
MONOCYTES NFR BLD AUTO: 7.9 % (ref 5–12)
NEUTROPHILS # BLD AUTO: 9.2 10*3/MM3 (ref 1.7–7)
NEUTROPHILS NFR BLD AUTO: 83.8 % (ref 42.7–76)
NITRITE UR QL STRIP: NEGATIVE
NRBC BLD AUTO-RTO: 0.5 /100 WBC (ref 0–0.2)
NT-PROBNP SERPL-MCNC: 95 PG/ML (ref 5–450)
PCO2 BLDA: 36 MM HG (ref 35–48)
PH BLDA: 7.41 PH UNITS (ref 7.35–7.45)
PH UR STRIP.AUTO: 6 [PH] (ref 5–8)
PLATELET # BLD AUTO: 134 10*3/MM3 (ref 140–450)
PMV BLD AUTO: 8.7 FL (ref 6–12)
PO2 BLDA: 65.1 MM HG (ref 83–108)
POTASSIUM BLD-SCNC: 4.9 MMOL/L (ref 3.5–5.2)
PROCALCITONIN SERPL-MCNC: 0.55 NG/ML (ref 0.1–0.25)
PROT SERPL-MCNC: 7.3 G/DL (ref 6–8.5)
PROT UR QL STRIP: ABNORMAL
RBC # BLD AUTO: 5.44 10*6/MM3 (ref 4.14–5.8)
RBC # UR: ABNORMAL /HPF
RBC MORPH BLD: NORMAL
REF LAB TEST METHOD: ABNORMAL
RHINOVIRUS RNA SPEC NAA+PROBE: NOT DETECTED
RSV RNA NPH QL NAA+NON-PROBE: NOT DETECTED
S PNEUM AG SPEC QL LA: NEGATIVE
S PYO AG THROAT QL: POSITIVE
SAO2 % BLDCOA: 92.8 % (ref 94–98)
SARS-COV-2 RNA RESP QL NAA+PROBE: NOT DETECTED
SMALL PLATELETS BLD QL SMEAR: ADEQUATE
SODIUM BLD-SCNC: 131 MMOL/L (ref 136–145)
SP GR UR STRIP: 1.02 (ref 1–1.03)
SQUAMOUS #/AREA URNS HPF: ABNORMAL /HPF
TROPONIN T SERPL-MCNC: <0.01 NG/ML (ref 0–0.03)
UROBILINOGEN UR QL STRIP: ABNORMAL
WBC MORPH BLD: NORMAL
WBC NRBC COR # BLD: 11 10*3/MM3 (ref 3.4–10.8)
WBC UR QL AUTO: ABNORMAL /HPF

## 2020-04-21 PROCEDURE — 84145 PROCALCITONIN (PCT): CPT | Performed by: NURSE PRACTITIONER

## 2020-04-21 PROCEDURE — 96374 THER/PROPH/DIAG INJ IV PUSH: CPT

## 2020-04-21 PROCEDURE — 25010000002 ONDANSETRON PER 1 MG

## 2020-04-21 PROCEDURE — 87635 SARS-COV-2 COVID-19 AMP PRB: CPT | Performed by: NURSE PRACTITIONER

## 2020-04-21 PROCEDURE — G0378 HOSPITAL OBSERVATION PER HR: HCPCS

## 2020-04-21 PROCEDURE — 87899 AGENT NOS ASSAY W/OPTIC: CPT | Performed by: NURSE PRACTITIONER

## 2020-04-21 PROCEDURE — 96375 TX/PRO/DX INJ NEW DRUG ADDON: CPT

## 2020-04-21 PROCEDURE — 93005 ELECTROCARDIOGRAM TRACING: CPT

## 2020-04-21 PROCEDURE — 80053 COMPREHEN METABOLIC PANEL: CPT | Performed by: NURSE PRACTITIONER

## 2020-04-21 PROCEDURE — 74177 CT ABD & PELVIS W/CONTRAST: CPT

## 2020-04-21 PROCEDURE — 82803 BLOOD GASES ANY COMBINATION: CPT

## 2020-04-21 PROCEDURE — 0099U HC BIOFIRE FILMARRAY RESP PANEL 1: CPT | Performed by: NURSE PRACTITIONER

## 2020-04-21 PROCEDURE — 99220 PR INITIAL OBSERVATION CARE/DAY 70 MINUTES: CPT | Performed by: INTERNAL MEDICINE

## 2020-04-21 PROCEDURE — 25010000002 ENOXAPARIN PER 10 MG: Performed by: NURSE PRACTITIONER

## 2020-04-21 PROCEDURE — 63710000001 INSULIN GLARGINE PER 5 UNITS: Performed by: NURSE PRACTITIONER

## 2020-04-21 PROCEDURE — 63710000001 INSULIN LISPRO (HUMAN) PER 5 UNITS: Performed by: NURSE PRACTITIONER

## 2020-04-21 PROCEDURE — 71045 X-RAY EXAM CHEST 1 VIEW: CPT

## 2020-04-21 PROCEDURE — 36600 WITHDRAWAL OF ARTERIAL BLOOD: CPT

## 2020-04-21 PROCEDURE — 87651 STREP A DNA AMP PROBE: CPT | Performed by: NURSE PRACTITIONER

## 2020-04-21 PROCEDURE — 82962 GLUCOSE BLOOD TEST: CPT

## 2020-04-21 PROCEDURE — 84484 ASSAY OF TROPONIN QUANT: CPT | Performed by: NURSE PRACTITIONER

## 2020-04-21 PROCEDURE — 86140 C-REACTIVE PROTEIN: CPT | Performed by: NURSE PRACTITIONER

## 2020-04-21 PROCEDURE — 96372 THER/PROPH/DIAG INJ SC/IM: CPT

## 2020-04-21 PROCEDURE — 85025 COMPLETE CBC W/AUTO DIFF WBC: CPT | Performed by: NURSE PRACTITIONER

## 2020-04-21 PROCEDURE — 83690 ASSAY OF LIPASE: CPT | Performed by: NURSE PRACTITIONER

## 2020-04-21 PROCEDURE — 87040 BLOOD CULTURE FOR BACTERIA: CPT | Performed by: NURSE PRACTITIONER

## 2020-04-21 PROCEDURE — 99284 EMERGENCY DEPT VISIT MOD MDM: CPT

## 2020-04-21 PROCEDURE — 25010000002 CEFTRIAXONE IN SWFI 1 GRAM/10ML IV PUSH SYRINGE (SIMPLE)

## 2020-04-21 PROCEDURE — 83605 ASSAY OF LACTIC ACID: CPT

## 2020-04-21 PROCEDURE — 93005 ELECTROCARDIOGRAM TRACING: CPT | Performed by: INTERNAL MEDICINE

## 2020-04-21 PROCEDURE — 85007 BL SMEAR W/DIFF WBC COUNT: CPT | Performed by: NURSE PRACTITIONER

## 2020-04-21 PROCEDURE — 83036 HEMOGLOBIN GLYCOSYLATED A1C: CPT | Performed by: NURSE PRACTITIONER

## 2020-04-21 PROCEDURE — 81001 URINALYSIS AUTO W/SCOPE: CPT | Performed by: NURSE PRACTITIONER

## 2020-04-21 PROCEDURE — 71275 CT ANGIOGRAPHY CHEST: CPT

## 2020-04-21 PROCEDURE — 0 IOPAMIDOL PER 1 ML: Performed by: NURSE PRACTITIONER

## 2020-04-21 PROCEDURE — 82728 ASSAY OF FERRITIN: CPT | Performed by: NURSE PRACTITIONER

## 2020-04-21 PROCEDURE — 83615 LACTATE (LD) (LDH) ENZYME: CPT | Performed by: NURSE PRACTITIONER

## 2020-04-21 PROCEDURE — 85379 FIBRIN DEGRADATION QUANT: CPT | Performed by: NURSE PRACTITIONER

## 2020-04-21 PROCEDURE — 83880 ASSAY OF NATRIURETIC PEPTIDE: CPT | Performed by: NURSE PRACTITIONER

## 2020-04-21 RX ORDER — BISACODYL 10 MG
10 SUPPOSITORY, RECTAL RECTAL DAILY PRN
Status: DISCONTINUED | OUTPATIENT
Start: 2020-04-21 | End: 2020-04-22 | Stop reason: HOSPADM

## 2020-04-21 RX ORDER — GUAIFENESIN 600 MG/1
1200 TABLET, EXTENDED RELEASE ORAL EVERY 12 HOURS SCHEDULED
Status: DISCONTINUED | OUTPATIENT
Start: 2020-04-21 | End: 2020-04-22 | Stop reason: HOSPADM

## 2020-04-21 RX ORDER — ACETAMINOPHEN 160 MG/5ML
650 SOLUTION ORAL EVERY 4 HOURS PRN
Status: DISCONTINUED | OUTPATIENT
Start: 2020-04-21 | End: 2020-04-22 | Stop reason: HOSPADM

## 2020-04-21 RX ORDER — SODIUM CHLORIDE 0.9 % (FLUSH) 0.9 %
10 SYRINGE (ML) INJECTION AS NEEDED
Status: DISCONTINUED | OUTPATIENT
Start: 2020-04-21 | End: 2020-04-22 | Stop reason: HOSPADM

## 2020-04-21 RX ORDER — CHOLECALCIFEROL (VITAMIN D3) 125 MCG
5 CAPSULE ORAL NIGHTLY PRN
Status: DISCONTINUED | OUTPATIENT
Start: 2020-04-21 | End: 2020-04-22 | Stop reason: HOSPADM

## 2020-04-21 RX ORDER — BUTALBITAL, ACETAMINOPHEN AND CAFFEINE 50; 325; 40 MG/1; MG/1; MG/1
1 TABLET ORAL EVERY 6 HOURS PRN
Status: DISCONTINUED | OUTPATIENT
Start: 2020-04-21 | End: 2020-04-22 | Stop reason: HOSPADM

## 2020-04-21 RX ORDER — AZITHROMYCIN 250 MG/1
500 TABLET, FILM COATED ORAL
Status: DISCONTINUED | OUTPATIENT
Start: 2020-04-21 | End: 2020-04-22

## 2020-04-21 RX ORDER — NITROGLYCERIN 0.4 MG/1
0.4 TABLET SUBLINGUAL
Status: DISCONTINUED | OUTPATIENT
Start: 2020-04-21 | End: 2020-04-22 | Stop reason: HOSPADM

## 2020-04-21 RX ORDER — ONDANSETRON 4 MG/1
4 TABLET, FILM COATED ORAL EVERY 6 HOURS PRN
Status: DISCONTINUED | OUTPATIENT
Start: 2020-04-21 | End: 2020-04-22 | Stop reason: HOSPADM

## 2020-04-21 RX ORDER — DEXTROSE MONOHYDRATE 25 G/50ML
25 INJECTION, SOLUTION INTRAVENOUS
Status: DISCONTINUED | OUTPATIENT
Start: 2020-04-21 | End: 2020-04-22 | Stop reason: HOSPADM

## 2020-04-21 RX ORDER — BENZONATATE 100 MG/1
100 CAPSULE ORAL 3 TIMES DAILY PRN
Status: DISCONTINUED | OUTPATIENT
Start: 2020-04-21 | End: 2020-04-22 | Stop reason: HOSPADM

## 2020-04-21 RX ORDER — ONDANSETRON 2 MG/ML
4 INJECTION INTRAMUSCULAR; INTRAVENOUS EVERY 6 HOURS PRN
Status: DISCONTINUED | OUTPATIENT
Start: 2020-04-21 | End: 2020-04-22 | Stop reason: HOSPADM

## 2020-04-21 RX ORDER — ONDANSETRON 2 MG/ML
INJECTION INTRAMUSCULAR; INTRAVENOUS
Status: COMPLETED
Start: 2020-04-21 | End: 2020-04-21

## 2020-04-21 RX ORDER — ACETAMINOPHEN 500 MG
TABLET ORAL
Status: COMPLETED
Start: 2020-04-21 | End: 2020-04-21

## 2020-04-21 RX ORDER — ACETAMINOPHEN 500 MG
1000 TABLET ORAL ONCE
Status: COMPLETED | OUTPATIENT
Start: 2020-04-21 | End: 2020-04-21

## 2020-04-21 RX ORDER — MAGNESIUM SULFATE 1 G/100ML
1 INJECTION INTRAVENOUS AS NEEDED
Status: DISCONTINUED | OUTPATIENT
Start: 2020-04-21 | End: 2020-04-22 | Stop reason: HOSPADM

## 2020-04-21 RX ORDER — ALUMINA, MAGNESIA, AND SIMETHICONE 2400; 2400; 240 MG/30ML; MG/30ML; MG/30ML
15 SUSPENSION ORAL EVERY 6 HOURS PRN
Status: DISCONTINUED | OUTPATIENT
Start: 2020-04-21 | End: 2020-04-22 | Stop reason: HOSPADM

## 2020-04-21 RX ORDER — ACETAMINOPHEN 325 MG/1
650 TABLET ORAL EVERY 4 HOURS PRN
Status: DISCONTINUED | OUTPATIENT
Start: 2020-04-21 | End: 2020-04-22 | Stop reason: HOSPADM

## 2020-04-21 RX ORDER — BUDESONIDE AND FORMOTEROL FUMARATE DIHYDRATE 160; 4.5 UG/1; UG/1
1 AEROSOL RESPIRATORY (INHALATION) 2 TIMES DAILY PRN
Status: DISCONTINUED | OUTPATIENT
Start: 2020-04-21 | End: 2020-04-22 | Stop reason: HOSPADM

## 2020-04-21 RX ORDER — INSULIN GLARGINE 100 [IU]/ML
10 INJECTION, SOLUTION SUBCUTANEOUS NIGHTLY
Status: DISCONTINUED | OUTPATIENT
Start: 2020-04-21 | End: 2020-04-22 | Stop reason: HOSPADM

## 2020-04-21 RX ORDER — ACETAMINOPHEN 650 MG/1
650 SUPPOSITORY RECTAL EVERY 4 HOURS PRN
Status: DISCONTINUED | OUTPATIENT
Start: 2020-04-21 | End: 2020-04-22 | Stop reason: HOSPADM

## 2020-04-21 RX ORDER — NICOTINE POLACRILEX 4 MG
15 LOZENGE BUCCAL
Status: DISCONTINUED | OUTPATIENT
Start: 2020-04-21 | End: 2020-04-22 | Stop reason: HOSPADM

## 2020-04-21 RX ORDER — ONDANSETRON 2 MG/ML
4 INJECTION INTRAMUSCULAR; INTRAVENOUS ONCE
Status: COMPLETED | OUTPATIENT
Start: 2020-04-21 | End: 2020-04-21

## 2020-04-21 RX ORDER — SODIUM CHLORIDE 0.9 % (FLUSH) 0.9 %
10 SYRINGE (ML) INJECTION EVERY 12 HOURS SCHEDULED
Status: DISCONTINUED | OUTPATIENT
Start: 2020-04-21 | End: 2020-04-22 | Stop reason: HOSPADM

## 2020-04-21 RX ORDER — MAGNESIUM SULFATE HEPTAHYDRATE 40 MG/ML
2 INJECTION, SOLUTION INTRAVENOUS AS NEEDED
Status: DISCONTINUED | OUTPATIENT
Start: 2020-04-21 | End: 2020-04-22 | Stop reason: HOSPADM

## 2020-04-21 RX ORDER — IPRATROPIUM BROMIDE AND ALBUTEROL SULFATE 2.5; .5 MG/3ML; MG/3ML
3 SOLUTION RESPIRATORY (INHALATION) EVERY 4 HOURS PRN
Status: DISCONTINUED | OUTPATIENT
Start: 2020-04-21 | End: 2020-04-22 | Stop reason: HOSPADM

## 2020-04-21 RX ORDER — POTASSIUM CHLORIDE 20 MEQ/1
40 TABLET, EXTENDED RELEASE ORAL AS NEEDED
Status: DISCONTINUED | OUTPATIENT
Start: 2020-04-21 | End: 2020-04-22 | Stop reason: HOSPADM

## 2020-04-21 RX ORDER — SODIUM CHLORIDE 9 MG/ML
100 INJECTION, SOLUTION INTRAVENOUS CONTINUOUS
Status: DISPENSED | OUTPATIENT
Start: 2020-04-21 | End: 2020-04-22

## 2020-04-21 RX ORDER — MODAFINIL 100 MG/1
100 TABLET ORAL DAILY
Status: DISCONTINUED | OUTPATIENT
Start: 2020-04-21 | End: 2020-04-22 | Stop reason: HOSPADM

## 2020-04-21 RX ADMIN — ONDANSETRON 4 MG: 2 INJECTION INTRAMUSCULAR; INTRAVENOUS at 09:46

## 2020-04-21 RX ADMIN — GUAIFENESIN 1200 MG: 600 TABLET, EXTENDED RELEASE ORAL at 20:23

## 2020-04-21 RX ADMIN — Medication 1000 MG: at 09:46

## 2020-04-21 RX ADMIN — ACETAMINOPHEN 650 MG: 325 TABLET, FILM COATED ORAL at 20:29

## 2020-04-21 RX ADMIN — CEFTRIAXONE SODIUM 1 G: 1 INJECTION, POWDER, FOR SOLUTION INTRAMUSCULAR; INTRAVENOUS at 09:30

## 2020-04-21 RX ADMIN — IOPAMIDOL 100 ML: 755 INJECTION, SOLUTION INTRAVENOUS at 11:51

## 2020-04-21 RX ADMIN — INSULIN GLARGINE 10 UNITS: 100 INJECTION, SOLUTION SUBCUTANEOUS at 20:23

## 2020-04-21 RX ADMIN — ENOXAPARIN SODIUM 40 MG: 40 INJECTION SUBCUTANEOUS at 15:42

## 2020-04-21 RX ADMIN — Medication 1 G: at 09:30

## 2020-04-21 RX ADMIN — ACETAMINOPHEN 1000 MG: 500 TABLET, FILM COATED ORAL at 09:46

## 2020-04-21 RX ADMIN — Medication 10 ML: at 20:23

## 2020-04-21 RX ADMIN — INSULIN LISPRO 2 UNITS: 100 INJECTION, SOLUTION INTRAVENOUS; SUBCUTANEOUS at 15:43

## 2020-04-21 RX ADMIN — SODIUM CHLORIDE 1000 ML: 0.9 INJECTION, SOLUTION INTRAVENOUS at 09:46

## 2020-04-21 RX ADMIN — AZITHROMYCIN MONOHYDRATE 500 MG: 250 TABLET ORAL at 15:42

## 2020-04-21 NOTE — H&P
[facility] Medicine Services      Patient Name: Montez Guy  : 1970  MRN: [id number]  Primary Care Physician: Gerard Salter MD  Date of admission: 2020    Patient Care Team:  Gerard Salter MD as PCP - General  Karen Beauchamp MD as Consulting Physician (Pulmonary Disease)  Karen Beauchamp MD as Consulting Physician (Pulmonary Disease)          Subjective   History Present Illness     Chief Complaint:   Chief Complaint   Patient presents with   • Fever       Mr. Guy is a 49 y.o. 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 The patient states his symptoms started 3 days ago. He states yesterday he developed some abdominal discomfort, nausea, and vomiting. He denies diarrhea. He reports Tmax 105F. He denies any exacerbating or alleviating factors.     The patient reports a history of necrotizing pneumonia approximately 6 years ago. He states he was in a coma for an extended period of time and septic. He states he was hospitalized for one year. He states that is when he lost his right lower leg. He states he quit smoking cigarettes at that time. He denies a history of asthma or COPD. He also denies a history of diabetes mellitus, but states his blood sugars get high whenever he has an infection. He states his blood sugars return to normal after his infection is cured. He is not on any diabetes medication at home. He states he checks his blood sugar once or twice per week, but not regularly. Review of records show A1c 8.0% on 20; he was hospitalized for cellulitis of the left lower extremity at that time.     Upon arrival to the ER the patient triggered sepsis protocol with T103.9 (rectally) and HR 98. Blood cultures were obtained. His labs were significant  "for WBC 11, neutrophils 83.8, lymphocytes 6.9, Plt 134, Na 131, glucose 322, bilirubin 1.3, lipase 67, lactate 1.6, procalcitonin 0.55, CRP 2.69, , and dimer 1.21. His respiratory virus panel was negative, but his Strep A antigen was positive. COVID-19 test pending. His CXR showed no acute findings. His CT chest showed no pulmonary embolism, but he was noted to have \"New focal groundglass opacities in the medial right lower and inferior-medial right upper lobes. This appearance is more suggestive of community-acquired or aspiration pneumonia and is not considered typical for COVID 19 pneumonia, although this cannot be excluded by imaging.\" His CT abd/pelvis showed no acute intra-abdominal or intrapelvic findings. He was given Tylenol 1 g PO, 1 liter normal saline, and ceftriaxone. He was admitted for further evaluation and treatment.          Review of Systems   Constitution: Positive for chills and fever.   HENT: Positive for sore throat.    Respiratory: Positive for cough and shortness of breath. Negative for sputum production.    Musculoskeletal: Positive for myalgias.   Gastrointestinal: Positive for abdominal pain, nausea and vomiting. Negative for diarrhea.   Neurological: Positive for weakness.   All other systems reviewed and are negative.        Personal History     Past Medical History:   Past Medical History:   Diagnosis Date   • Cellulitis of left lower extremity 01/2020   • Diabetes (CMS/HCC)    • Hypertension    • MRSA (methicillin resistant staph aureus) culture positive    • Obesity (BMI 30-39.9) 1/16/2020   • Obstructive sleep apnea syndrome 1/16/2020   • Pneumonia 2014    nectrotizing pneumonia       Surgical History:      Past Surgical History:   Procedure Laterality Date   • BELOW KNEE LEG AMPUTATION Right    • TOE AMPUTATION         Family History: family history includes Cancer in his maternal grandmother and mother. Otherwise pertinent FHx was reviewed and unremarkable.     Social " History:  reports that he quit smoking about 6 years ago. His smoking use included cigarettes. He has never used smokeless tobacco. He reports that he does not drink alcohol or use drugs.      Medications:  Prior to Admission medications    Medication Sig Start Date End Date Taking? Authorizing Provider   aspirin-acetaminophen-caffeine (EXCEDRIN MIGRAINE) 250-250-65 MG per tablet Take 2 tablets by mouth Every 6 (Six) Hours As Needed for Headache, Mild Pain  or Moderate Pain .   Yes Barbie Banks MD   budesonide-formoterol (SYMBICORT) 160-4.5 MCG/ACT inhaler Inhale 1 puff 2 (Two) Times a Day As Needed (pt uses PRN). 5/19/16  Yes Barbie Banks MD   lisinopril-hydrochlorothiazide (PRINZIDE,ZESTORETIC) 10-12.5 MG per tablet Take 1 tablet by mouth Daily.   Yes Barbie Banks MD   modafinil (PROVIGIL) 100 MG tablet Take 100 mg by mouth Daily. RX is for BID, pt just takes QD   Yes ProviderBarbie MD   insulin glargine (LANTUS) 100 UNIT/ML injection Inject 10 Units under the skin into the appropriate area as directed Every Night. 1/19/20 4/21/20  Alonzo Bates MD       Allergies:  No Known Allergies    Objective   Objective     Vital Signs  Temp:  [98.6 °F (37 °C)-103.9 °F (39.9 °C)] 98.6 °F (37 °C)  Heart Rate:  [68-98] 68  Resp:  [16-19] 16  BP: (129-159)/(76-86) 133/83  SpO2:  [95 %-97 %] 97 %  on   ;   Device (Oxygen Therapy): room air  Body mass index is 36.59 kg/m².      Physical Exam ----ATTENDING MD TO COMPLETE      Results Review:  I have personally reviewed most recent cardiac tracings, lab results, microbiology results and radiology images and interpretations and agree with findings.    Results from last 7 days   Lab Units 04/21/20  1007   WBC 10*3/mm3 11.00*   HEMOGLOBIN g/dL 14.7   HEMATOCRIT % 43.1   PLATELETS 10*3/mm3 134*     Results from last 7 days   Lab Units 04/21/20  1008 04/21/20  1007   SODIUM mmol/L  --  131*   POTASSIUM mmol/L  --  4.9   CHLORIDE mmol/L  --  97*      CO2 mmol/L  --  21.0*   BUN mg/dL  --  20   CREATININE mg/dL  --  1.27   GLUCOSE mg/dL  --  322*   CALCIUM mg/dL  --  9.5   ALT (SGPT) U/L  --  31   AST (SGOT) U/L  --  36   TROPONIN T ng/mL  --  <0.010   PROBNP pg/mL  --  95.0   LACTATE mmol/L 1.6  --    PROCALCITONIN ng/mL  --  0.55*     Estimated Creatinine Clearance: 96 mL/min (by C-G formula based on SCr of 1.27 mg/dL).  Brief Urine Lab Results  (Last result in the past 365 days)      Color   Clarity   Blood   Leuk Est   Nitrite   Protein   CREAT   Urine HCG        04/21/20 1008 Yellow Clear Large (3+) Negative Negative >=300 mg/dL (3+)               Microbiology Results (last 10 days)     Procedure Component Value - Date/Time    Respiratory Panel, PCR - Swab, Nasopharynx [[id number]]  (Normal) Collected:  04/21/20 1007    Lab Status:  Final result Specimen:  Swab from Nasopharynx Updated:  04/21/20 1136     ADENOVIRUS, PCR Not Detected     Coronavirus 229E Not Detected     Coronavirus HKU1 Not Detected     Coronavirus NL63 Not Detected     Coronavirus OC43 Not Detected     Human Metapneumovirus Not Detected     Human Rhinovirus/Enterovirus Not Detected     Influenza B PCR Not Detected     Parainfluenza Virus 1 Not Detected     Parainfluenza Virus 2 Not Detected     Parainfluenza Virus 3 Not Detected     Parainfluenza Virus 4 Not Detected     Bordetella pertussis pcr Not Detected     Influenza A H1 2009 PCR Not Detected     Chlamydophila pneumoniae PCR Not Detected     Mycoplasma pneumo by PCR Not Detected     Influenza A PCR Not Detected     Influenza A H3 Not Detected     Influenza A H1 Not Detected     RSV, PCR Not Detected    Narrative:       The coronavirus on the RVP is NOT COVID-19 and is NOT indicative of infection with COVID-19.     Rapid Strep A Screen - Swab, Throat [[id number]]  (Abnormal) Collected:  04/21/20 1007    Lab Status:  Final result Specimen:  Swab from Throat Updated:  04/21/20 1019     Strep A Ag Positive          ECG/EMG Results (most  recent)     Procedure Component Value Units Date/Time    ECG 12 Lead [[id number]] Collected:  04/21/20 0929     Updated:  04/21/20 0935    Narrative:       HEART RATE= 97  bpm  RR Interval= 620  ms  OH Interval= 146  ms  P Horizontal Axis= 25  deg  P Front Axis= 8  deg  QRSD Interval= 116  ms  QT Interval= 356  ms  QRS Axis= 27  deg  T Wave Axis= 48  deg  - ABNORMAL ECG -  Sinus rhythm  Incomplete right bundle branch block  Electronically Signed By:   Date and Time of Study: 2020-04-21 09:29:52    ECG 12 Lead [[id number]] Resulted:  04/21/20 1332     Updated:  04/21/20 1332          Results for orders placed during the hospital encounter of 01/16/20   Duplex Venous Lower Extremity - Left CAR    Narrative · Normal left lower extremity venous duplex scan.               Ct Abdomen Pelvis With Contrast    Result Date: 4/21/2020   1. No acute intra-abdominal or intrapelvic process. 2. Groundglass appearing consolidation within the medial right lung base, likely related to pneumonia. Acute viral illness cannot be excluded. 3. Ancillary findings as described above.  Electronically Signed BySusan Clarke On:4/21/2020 12:09 PM This report was finalized on [id number] by  Joselin Clarke, .    Xr Chest 1 View    Result Date: 4/21/2020  No acute cardiopulmonary process.  Electronically Signed BySusan Clarke On:4/21/2020 10:11 AM This report was finalized on [id number] by  Joselin Clarke, .    Ct Chest Pulmonary Embolism    Result Date: 4/21/2020  1.No definite findings of acute pulmonary embolism. 2.New focal groundglass opacities in the medial right lower and inferior-medial right upper lobes. This appearance is more suggestive of community-acquired or aspiration pneumonia and is not considered typical for COVID 19 pneumonia, although this cannot be excluded by imaging. 3.Cardiomegaly with prominent central pulmonary vasculature suggesting pulmonary artery hypertension. 4. Emphysema with chronic changes which appear similar to  2018 except for decreased opacity in the inferior right upper/right middle lobe where there is now focal bronchial dilatation. 5.Please see separate report for evaluation of the abdomen and pelvis.  Electronically Signed By-DR. Edi Lezama MD On:4/21/2020 12:20 PM This report was finalized on [id number] by DR. Edi Lezama MD.        Estimated Creatinine Clearance: 96 mL/min (by C-G formula based on SCr of 1.27 mg/dL).    Assessment/Plan   Assessment/Plan       Active Hospital Problems    Diagnosis  POA   • **Streptococcal infection group A [B95.0]  Yes     Priority: High   • Pneumonia involving right lung [J18.9]  Yes     Priority: High   • Suspected Covid-19 Virus Infection [R68.89]  Yes     Priority: High   • Fever [R50.9]  Yes     Priority: Medium   • Sepsis (CMS/HCC) [A41.9]  Yes     Priority: Medium   • Type 2 diabetes mellitus with hyperglycemia, without long-term current use of insulin (CMS/HCC) [E11.65]  Yes     Priority: Low   • Below-knee amputation of right lower extremity (CMS/HCC) [S88.111A]  Yes   • Benign essential hypertension [I10]  Yes   • Obesity (BMI 30-39.9) [E66.9]  Yes   • Obstructive sleep apnea syndrome [G47.33]  Yes      Resolved Hospital Problems   No resolved problems to display.       Streptococcal infection group A  -blood cultures pending  -continue ceftriaxone  -chloraseptic spray PRN    Pneumonia involving right lung, Suspected Covid-19 Virus Infection  -likely CAP vs aspiration pneumonia per CT scan, but cannot exclude COVID  -Strep A ag positive  -RVP negative  -COVID-19 pending, enhanced droplet/contact isolation for now  -blood cultures pending  -obtain sputum culture  -check urine antigens  -continue ceftriaxone, add azithromycin   -supplemental O2 PRN to keep sat>92%  -MDI PRN    Fever with possible Sepsis   -T103.9, HR 98, WBC 11, lactate 1.6, procal 0.55 in ER  -given Tylenol and 1 L IVF in ER with improvement in temp and HR  -monitor temp and continue Tylenol  PRN  -cultures and empiric abx as above    Type 2 diabetes mellitus with hyperglycemia, without long-term current use of insulin   -patient denies history of DM and states secondary to infection only; not on any home medication for diabetes  -glucose 322 on admission  -check A1c (was 8.0% on 01/16/20)  -accu checks TID AC with SSI  -LCS diet  -consult diabetes educator   -add Lantus 10 units nightly; adjust as needed    Benign essential hypertension  -moderately controlled  -continue lisinopril-HCTZ  -monitor BP    Obstructive sleep apnea syndrome  -patient reports compliance with nocturnal CPAP  -continue Provigil     Obesity (BMI 30-39.9)  -BMI 36.59  -encourage lifestyle modifications     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   -fall risk precautions  -up ad kendall, with assistance         VTE Prophylaxis -   Mechanical Order History:     None      Pharmalogical Order History:     Ordered     Dose Route Frequency Stop    04/21/20 1516  enoxaparin (LOVENOX) syringe 40 mg      40 mg SC Every 24 Hours --          CODE STATUS:    Code Status and Medical Interventions:   Ordered at: 04/21/20 1516     Code Status:    CPR     Medical Interventions (Level of Support Prior to Arrest):    Full       This patient has been examined wearing appropriate Personal Protective Equipment. 04/21/20      I discussed the patient's findings and my recommendations with patient.        Electronically signed by SUKHJINDER Swift, 04/21/20, 3:34 PM.  [facility]ist Team

## 2020-04-21 NOTE — ED PROVIDER NOTES
Subjective   Chief complaint: fever, body aches, cough    Context: Patient is a 49-year-old male who comes in complaining of body aches cough some shortness of breath generalized abdominal pain and fever for the last 3 days. Has had an intermittently productive cough without hemoptysis, no unexplained weight loss or IVDA. He states the fever started last night.  He did not take any Tylenol or Motrin prior to arrival.  He states he has been staying home and does not have any known ill contacts that he is aware of.  He denies any chest pain.  He states he was hospitalized in January for fever.  He states he was on antibiotics in February from infectious disease for cellulitis in his left lower extremity and has some chronic skin discoloration noted to that leg that is not changed; no increased swelling.  He denies any urinary complaints.  He denies any diarrhea but states he has had some nausea with intermittent vomiting.  He has had diffuse mild abdominal pain without any specific point tenderness.  Denies any recent trauma surgery or immobilization. Has had a sore throat without congestion or ear pain.  No changes in phonation difficult handling oral secretions or speaking full sentences.    Duration:3 days    Timing: waxes and wanes, worse since last night    Severity: moderate    Associated symptoms: has not had any meds prior to arrival          PCP: lloyd          Review of Systems   Constitutional: Positive for fever.   HENT: Negative for congestion.    Respiratory: Positive for cough and shortness of breath.    Cardiovascular: Negative for chest pain.   Gastrointestinal: Positive for abdominal pain, nausea and vomiting.   Genitourinary: Negative.    Musculoskeletal: Negative.    Skin: Negative.    Neurological: Negative for weakness.   Hematological: Does not bruise/bleed easily.       Past Medical History:   Diagnosis Date   • Diabetes (CMS/[facility])    • Hypertension    • MRSA (methicillin resistant staph  aureus) culture positive        No Known Allergies    Past Surgical History:   Procedure Laterality Date   • BELOW KNEE LEG AMPUTATION     • TOE AMPUTATION         History reviewed. No pertinent family history.    Social History     Socioeconomic History   • Marital status:      Spouse name: Not on file   • Number of children: Not on file   • Years of education: Not on file   • Highest education level: Not on file   Tobacco Use   • Smoking status: Former Smoker     Last attempt to quit: 2016     Years since quittin.2   • Smokeless tobacco: Never Used   Substance and Sexual Activity   • Alcohol use: Never     Frequency: Never   • Drug use: Never   • Sexual activity: Defer           Objective   Physical Exam     Vital signs and triage nurse note reviewed.   Constitutional: Awake, alert; well-developed and well-nourished. No acute distress is noted.   HEENT: Normocephalic, atraumatic; pupils are PERRL with intact EOM.  No changes in phonation difficulty handling oral secretions or speaking full sentences  Neck: Supple, full range of motion without pain; no cervical lymphadenopathy.  Negative Brudzinski   Cardiovascular: Regular rate and rhythm, normal S1-S2.   Pulmonary: Respiratory effort regular nonlabored, breath sounds-bilateral lower lobes   Abdomen: Soft, mild diffuse tenderness nondistended with normoactive bowel sounds; no rebound or guarding.  There is no peritoneal rigidity or guarding, negative CVAT.  Musculoskeletal: Independent range of motion of all extremities with no palpable tenderness or edema; right BKA.  There is some chronic skin discoloration noted to the left lower extremity from the mid tib-fib to the ankle, negative Lavell   Neuro: Alert oriented x3, speech is clear and appropriate, GCS 15   Skin:  Fleshtone hot, dry, intact; no erythematous or petechial rash or lesion       ECG 12 Lead    Date/Time: 2020 9:29 AM  Performed by: Marium Villalba APRN  Authorized by:  "Marium Villalba APRN   Interpreted by physician (layla)  Comparison: compared with previous ECG from 1/16/2020  Comparison to previous ECG: Sinus rhythm  Rhythm: sinus rhythm  BPM: 97  Conduction: incomplete RBBB                 ED Course  ED Course as of Apr 21 1039   Tue Apr 21, 2020   1039 Cts ordered after some labs resulted for further eval.    [JW]      ED Course User Index  [JW] Marium Villalba APRN           Labs Reviewed   RAPID STREP A SCREEN - Abnormal; Notable for the following components:       Result Value    Strep A Ag Positive (*)     All other components within normal limits   COMPREHENSIVE METABOLIC PANEL - Abnormal; Notable for the following components:    Glucose 322 (*)     Sodium 131 (*)     Chloride 97 (*)     CO2 21.0 (*)     Total Bilirubin 1.3 (*)     All other components within normal limits    Narrative:     GFR Normal >60  Chronic Kidney Disease <60  Kidney Failure <15     LACTATE DEHYDROGENASE - Abnormal; Notable for the following components:     (*)     All other components within normal limits   PROCALCITONIN - Abnormal; Notable for the following components:    Procalcitonin 0.55 (*)     All other components within normal limits    Narrative:     As a Marker for Sepsis (Non-Neonates):   1. <0.5 ng/mL represents a low risk of severe sepsis and/or septic shock.  1. >2 ng/mL represents a high risk of severe sepsis and/or septic shock.    As a Marker for Lower Respiratory Tract Infections that require antibiotic therapy:  PCT on Admission     Antibiotic Therapy             6-12 Hrs later  > 0.5                Strongly Recommended            >0.25 - <0.5         Recommended  0.1 - 0.25           Discouraged                   Remeasure/reassess PCT  <0.1                 Strongly Discouraged          Remeasure/reassess PCT      As 28 day mortality risk marker: \"Change in Procalcitonin Result\" (> 80 % or <=80 %) if Day 0 (or Day 1) and Day 4 values are available. Refer to " [email or web address]-pct-[email or web address]/   Change in PCT <=80 %   A decrease of PCT levels below or equal to 80 % defines a positive change in PCT test result representing a higher risk for 28-day all-cause mortality of patients diagnosed with severe sepsis or septic shock.  Change in PCT > 80 %   A decrease of PCT levels of more than 80 % defines a negative change in PCT result representing a lower risk for 28-day all-cause mortality of patients diagnosed with severe sepsis or septic shock.                Results may be falsely decreased if patient taking Biotin.    D-DIMER, QUANTITATIVE - Abnormal; Notable for the following components:    D-Dimer, Quantitative 1.21 (*)     All other components within normal limits    Narrative:     Reference Range  --------------------------------------------------------------------     < 0.50   Negative Predictive Value  0.50-0.59   Indeterminate    >= 0.60   Probable VTE             A very low percentage of patients with DVT may yield D-Dimer results   below the cut-off of 0.50 MCGFEU/mL.  This is known to be more   prevalent in patients with distal DVT.             Results of this test should always be interpreted in conjunction with   the patient's medical history, clinical presentation and other   findings.  Clinical diagnosis should not be based on the result of   IN[place]OV[place] D-Dimer alone.   C-REACTIVE PROTEIN - Abnormal; Notable for the following components:    C-Reactive Protein 2.69 (*)     All other components within normal limits   CBC WITH AUTO DIFFERENTIAL - Abnormal; Notable for the following components:    WBC 11.00 (*)     RDW 15.6 (*)     Platelets 134 (*)     Neutrophil % 83.8 (*)     Lymphocyte % 6.9 (*)     Neutrophils, Absolute 9.20 (*)     nRBC 0.5 (*)     All other components within normal limits    Narrative:     Appended report. These results have been appended to a previously verified report.   LIPASE - Abnormal; Notable for the following components:     Lipase 67 (*)     All other components within normal limits   URINALYSIS W/ MICROSCOPIC IF INDICATED (NO CULTURE) - Abnormal; Notable for the following components:    Glucose,  mg/dL (2+) (*)     Blood, UA Large (3+) (*)     Protein, UA >=300 mg/dL (3+) (*)     All other components within normal limits   URINALYSIS, MICROSCOPIC ONLY - Abnormal; Notable for the following components:    RBC, UA 13-20 (*)     WBC, UA 0-2 (*)     All other components within normal limits   BLOOD GAS, ARTERIAL - Abnormal; Notable for the following components:    pO2, Arterial 65.1 (*)     Base Excess, Arterial -1.3 (*)     O2 Saturation, Arterial 92.8 (*)     All other components within normal limits   RESPIRATORY PANEL, PCR - Normal    Narrative:     The coronavirus on the RVP is NOT COVID-19 and is NOT indicative of infection with COVID-19.    FERRITIN - Normal    Narrative:     Results may be falsely decreased if patient taking Biotin.     TROPONIN (IN-HOUSE) - Normal    Narrative:     Troponin T Reference Range:  <= 0.03 ng/mL-   Negative for AMI  >0.03 ng/mL-     Abnormal for myocardial necrosis.  Clinicians would have to utilize clinical acumen, EKG, Troponin and serial changes to determine if it is an Acute Myocardial Infarction or myocardial injury due to an underlying chronic condition.       Results may be falsely decreased if patient taking Biotin.     BNP (IN-HOUSE) - Normal    Narrative:     Among patients with dyspnea, NT-proBNP is highly sensitive for the detection of acute congestive heart failure. In addition NT-proBNP of <300 pg/ml effectively rules out acute congestive heart failure with 99% negative predictive value.    Results may be falsely decreased if patient taking Biotin.     POC LACTATE - Normal   POC LACTATE - Normal   SARS-COV-2 PCR ([place] IN-HOUSE PERFORMED), NP SWAB IN TRANSPORT MEDIA   BLOOD CULTURE   BLOOD CULTURE   BLOOD GAS, ARTERIAL   SCAN SLIDE    Narrative:     Slide Reviewed     CBC AND  DIFFERENTIAL    Narrative:     The following orders were created for panel order CBC & Differential.  Procedure                               Abnormality         Status                     ---------                               -----------         ------                     CBC Auto Differential[[id number]]        Abnormal            Final result                 Please view results for these tests on the individual orders.     Medications   sodium chloride 0.9 % flush 10 mL (has no administration in time range)   iopamidol (ISOVUE-370) 76 % injection 100 mL (has no administration in time range)   cefTRIAXone (ROCEPHIN) in SWFI 1 gram/10ml IV PUSH syringe (1 g Intravenous Given 4/21/20 0930)   acetaminophen (TYLENOL) tablet 1,000 mg (1,000 mg Oral Given 4/21/20 0946)   ondansetron (ZOFRAN) injection 4 mg (4 mg Intravenous Given 4/21/20 0946)   sodium chloride 0.9 % bolus 1,000 mL (1,000 mL Intravenous New Bag 4/21/20 0946)   iopamidol (ISOVUE-370) 76 % injection 100 mL (100 mL Intravenous Given 4/21/20 1151)     Ct Abdomen Pelvis With Contrast    Result Date: 4/21/2020   1. No acute intra-abdominal or intrapelvic process. 2. Groundglass appearing consolidation within the medial right lung base, likely related to pneumonia. Acute viral illness cannot be excluded. 3. Ancillary findings as described above.  Electronically Signed BySusan Clarke On:4/21/2020 12:09 PM This report was finalized on [id number] by  Beba Gamboa    Xr Chest 1 View    Result Date: 4/21/2020  No acute cardiopulmonary process.  Electronically Signed BySusan Clarke On:4/21/2020 10:11 AM This report was finalized on [id number] by  Beba Gamboa    Ct Chest Pulmonary Embolism    Result Date: 4/21/2020  1.No definite findings of acute pulmonary embolism. 2.New focal groundglass opacities in the medial right lower and inferior-medial right upper lobes. This appearance is more suggestive of community-acquired or aspiration pneumonia and is  not considered typical for COVID 19 pneumonia, although this cannot be excluded by imaging. 3.Cardiomegaly with prominent central pulmonary vasculature suggesting pulmonary artery hypertension. 4. Emphysema with chronic changes which appear similar to 2018 except for decreased opacity in the inferior right upper/right middle lobe where there is now focal bronchial dilatation. 5.Please see separate report for evaluation of the abdomen and pelvis.  Electronically Signed By-DR. Edi Lezama MD On:4/21/2020 12:20 PM This report was finalized on [id number] by DR. Edi Lezama MD.                                    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  Differentials: Sepsis strep pneumonia bacteremia COVID not all inclusive of differentials considered  Discussion with provider:  hospitalist  Radiology interpretation:  X-rays reviewed by me and interpreted by radiologist,   Ct Abdomen Pelvis With Contrast    Result Date: 4/21/2020   1. No acute intra-abdominal or intrapelvic process. 2. Groundglass appearing consolidation within the medial right lung base, likely related to pneumonia. Acute viral illness cannot be excluded. 3. Ancillary findings as described above.  Electronically Signed By-Joselin Clarke On:4/21/2020 12:09 PM This report was finalized on [id number] by  Joselin Clarke, .    Xr Chest 1 View    Result Date: 4/21/2020  No acute  cardiopulmonary process.  Electronically Signed By-Joselin Clarke On:4/21/2020 10:11 AM This report was finalized on [id number] by  Joselin Clarke, .    Ct Chest Pulmonary Embolism    Result Date: 4/21/2020  1.No definite findings of acute pulmonary embolism. 2.New focal groundglass opacities in the medial right lower and inferior-medial right upper lobes. This appearance is more suggestive of community-acquired or aspiration pneumonia and is not considered typical for COVID 19 pneumonia, although this cannot be excluded by imaging. 3.Cardiomegaly with prominent central pulmonary vasculature suggesting pulmonary artery hypertension. 4. Emphysema with chronic changes which appear similar to 2018 except for decreased opacity in the inferior right upper/right middle lobe where there is now focal bronchial dilatation. 5.Please see separate report for evaluation of the abdomen and pelvis.  Electronically Signed By-DR. Edi Lezama MD On:4/21/2020 12:20 PM This report was finalized on [id number] by DR. Edi Lezama MD.    Lab interpretation:  Labs viewed by me significant for, dimer 1.21, positive strep, + hematuria, crp 2.69, procalc 0.55, lipase 67, pao2 65    Appropriate PPE worn during exam.    i discussed findings with patient who voices understanding of admission      Patient Progress  Patient progress: stable      Final diagnoses:   Fever, unspecified fever cause   Dyspnea, unspecified type   Strep pharyngitis   Hematuria, unspecified type            Marium Villalba, APRN  04/21/20 7716

## 2020-04-21 NOTE — CONSULTS
"Diabetes Education  Assessment/Teaching    Patient Name:  Montez Guy  YOB: 1970  MRN: [id number]  Admit Date:  4/21/2020      Assessment Date:  4/21/2020    Most Recent Value   General Information    Referral From:  -- [Received consult due to bs >200 mg/dL. Adm bs 322 mg/dL. ]   Height  182.9 cm (72.01\")   Weight  122 kg (269 lb 13.5 oz)   Pregnancy Assessment   Diabetes History   What type of diabetes do you have?  -- [Pt states he does not have diabetes. He states his bs runs high when he has an infection. ]   Do you test your blood sugar at home?  -- [Pt states he has bs meter at home but has not been checking bs and meter is 5-6 years old.]   Education Preferences   What areas of diabetes would you like to learn about?  avoiding high blood sugar, diabetes complications, medications for diabetes, understanding diabetes, testing my blood sugar at home, avoiding low blood sugar   Nutrition Information   Assessment Topics   Taking Medication - Assessment  Needs education   Problem Solving - Assessment  Needs education   Reducing Risk - Assessment  Needs education   Monitoring - Assessment  Needs education   DM Goals   Taking Medication - Goal  Today   Problem Solving - Goal  Today   Reducing Risk - Goal  Today   Monitoring - Goal  -- [Pt will need follow up to get new bs meter. ]            Most Recent Value   DM Education Needs   Meter  -- [Pt states he will need a new bs meter if he is to be checking his bs.]   Meter Type  -- [Pt not sure which meter he has at home right now but it is an older meter. ]   Blood Glucose Target Range  Reviewed pt's A1c result from 1/16/2020 and discussed diagnostic criteria for diabetes. Discussed healthy A1c target and healthy bs range. Reviewed importance of bs control.    Medication  Pen [Pt states he has Lantus pens at home but has not used for several years. Pt states was sent home from hospital in the past due to elevated bs from infection. He states has " Lantus pens. ]   Problem Solving  Hyperglycemia, Signs, Symptoms, Treatment   Reducing Risks  A1C testing [Discussed with pt his A1c is being checked this adm. Reviewed that this test determines average bs over past 2-3 months.]   Healthy Eating  RD consult   Motivation  Engaged   Teaching Method  Explanation, Discussion   Patient Response  Verbalized understanding            Other Comments:  Education done per phone call. Pt states has not been taking any diabetes medication. Pt was discharged from [facility] in January 2020 and was to be taking lantus 10 units at hs. Pt states he was not aware he was supposed to be taking this medication. He has used insulin pens in the past. Reviewed use of insulin pen with pt. Discussed importance of priming the pen prior to use and counting 10 seconds after injecting the insulin before withdrawing the pen needle. Discussed when to take the insulin. Pt is receiving sliding scale insulin only in hospital at time educator contacted pt. Pt has since been started on lantus 10 units hs in hospital. Pt has right BKA in 2014. Pt states this was the result of MRSA. Pt states he has had two toes on left foot amputated due to previous infections. Discussed with pt he does have diabetes and reviewed importance of taking medication as prescribed at discharge. Reviewed healthy bs range. Educator will follow up on different day to discuss this adm A1c result, give bs monitor and review all aspects of insulin administration since pt has been started on Lantus in hospital (storage, injections sites, disposal of pen needles). Rxs started in Epic for Lantus and pen needles.         Electronically signed by:  Patito Carlton RN  04/21/20 18:08

## 2020-04-22 ENCOUNTER — READMISSION MANAGEMENT (OUTPATIENT)
Dept: CALL CENTER | Facility: HOSPITAL | Age: 50
End: 2020-04-22

## 2020-04-22 VITALS
TEMPERATURE: 98.4 F | HEIGHT: 72 IN | HEART RATE: 74 BPM | RESPIRATION RATE: 22 BRPM | BODY MASS INDEX: 35.21 KG/M2 | WEIGHT: 260 LBS | SYSTOLIC BLOOD PRESSURE: 123 MMHG | OXYGEN SATURATION: 94 % | DIASTOLIC BLOOD PRESSURE: 78 MMHG

## 2020-04-22 PROBLEM — Z20.822 SUSPECTED COVID-19 VIRUS INFECTION: Status: RESOLVED | Noted: 2020-04-21 | Resolved: 2020-04-22

## 2020-04-22 LAB
ALBUMIN SERPL-MCNC: 4 G/DL (ref 3.5–5.2)
ALBUMIN/GLOB SERPL: 1.3 G/DL
ALP SERPL-CCNC: 77 U/L (ref 39–117)
ALT SERPL W P-5'-P-CCNC: 27 U/L (ref 1–41)
ANION GAP SERPL CALCULATED.3IONS-SCNC: 11 MMOL/L (ref 5–15)
AST SERPL-CCNC: 27 U/L (ref 1–40)
BASOPHILS # BLD AUTO: 0.1 10*3/MM3 (ref 0–0.2)
BASOPHILS NFR BLD AUTO: 1.2 % (ref 0–1.5)
BILIRUB SERPL-MCNC: 1.6 MG/DL (ref 0.2–1.2)
BUN BLD-MCNC: 14 MG/DL (ref 6–20)
BUN/CREAT SERPL: 12.8 (ref 7–25)
CALCIUM SPEC-SCNC: 9 MG/DL (ref 8.6–10.5)
CHLORIDE SERPL-SCNC: 97 MMOL/L (ref 98–107)
CO2 SERPL-SCNC: 27 MMOL/L (ref 22–29)
CREAT BLD-MCNC: 1.09 MG/DL (ref 0.76–1.27)
DEPRECATED RDW RBC AUTO: 45.5 FL (ref 37–54)
EOSINOPHIL # BLD AUTO: 0.2 10*3/MM3 (ref 0–0.4)
EOSINOPHIL NFR BLD AUTO: 2.4 % (ref 0.3–6.2)
ERYTHROCYTE [DISTWIDTH] IN BLOOD BY AUTOMATED COUNT: 16.1 % (ref 12.3–15.4)
GFR SERPL CREATININE-BSD FRML MDRD: 72 ML/MIN/1.73
GLOBULIN UR ELPH-MCNC: 3 GM/DL
GLUCOSE BLD-MCNC: 240 MG/DL (ref 65–99)
GLUCOSE BLDC GLUCOMTR-MCNC: 244 MG/DL (ref 70–105)
GLUCOSE BLDC GLUCOMTR-MCNC: 270 MG/DL (ref 70–105)
HBA1C MFR BLD: 9.4 % (ref 3.5–5.6)
HCT VFR BLD AUTO: 41.5 % (ref 37.5–51)
HGB BLD-MCNC: 14.6 G/DL (ref 13–17.7)
LIPASE SERPL-CCNC: 85 U/L (ref 13–60)
LYMPHOCYTES # BLD AUTO: 1.2 10*3/MM3 (ref 0.7–3.1)
LYMPHOCYTES NFR BLD AUTO: 16.2 % (ref 19.6–45.3)
MAGNESIUM SERPL-MCNC: 1.8 MG/DL (ref 1.6–2.6)
MCH RBC QN AUTO: 28.6 PG (ref 26.6–33)
MCHC RBC AUTO-ENTMCNC: 35.2 G/DL (ref 31.5–35.7)
MCV RBC AUTO: 81.2 FL (ref 79–97)
MONOCYTES # BLD AUTO: 1.1 10*3/MM3 (ref 0.1–0.9)
MONOCYTES NFR BLD AUTO: 14.6 % (ref 5–12)
NEUTROPHILS # BLD AUTO: 4.7 10*3/MM3 (ref 1.7–7)
NEUTROPHILS NFR BLD AUTO: 65.6 % (ref 42.7–76)
NRBC BLD AUTO-RTO: 0.5 /100 WBC (ref 0–0.2)
PLATELET # BLD AUTO: 121 10*3/MM3 (ref 140–450)
PMV BLD AUTO: 8.6 FL (ref 6–12)
POTASSIUM BLD-SCNC: 4.8 MMOL/L (ref 3.5–5.2)
PROCALCITONIN SERPL-MCNC: 0.59 NG/ML (ref 0.1–0.25)
PROT SERPL-MCNC: 7 G/DL (ref 6–8.5)
RBC # BLD AUTO: 5.11 10*6/MM3 (ref 4.14–5.8)
SODIUM BLD-SCNC: 135 MMOL/L (ref 136–145)
WBC NRBC COR # BLD: 7.2 10*3/MM3 (ref 3.4–10.8)

## 2020-04-22 PROCEDURE — 25010000002 CEFTRIAXONE PER 250 MG: Performed by: INTERNAL MEDICINE

## 2020-04-22 PROCEDURE — 85025 COMPLETE CBC W/AUTO DIFF WBC: CPT | Performed by: NURSE PRACTITIONER

## 2020-04-22 PROCEDURE — G0378 HOSPITAL OBSERVATION PER HR: HCPCS

## 2020-04-22 PROCEDURE — 83690 ASSAY OF LIPASE: CPT | Performed by: NURSE PRACTITIONER

## 2020-04-22 PROCEDURE — 84145 PROCALCITONIN (PCT): CPT | Performed by: NURSE PRACTITIONER

## 2020-04-22 PROCEDURE — 82962 GLUCOSE BLOOD TEST: CPT

## 2020-04-22 PROCEDURE — 94799 UNLISTED PULMONARY SVC/PX: CPT

## 2020-04-22 PROCEDURE — 99217 PR OBSERVATION CARE DISCHARGE MANAGEMENT: CPT | Performed by: INTERNAL MEDICINE

## 2020-04-22 PROCEDURE — 83735 ASSAY OF MAGNESIUM: CPT | Performed by: NURSE PRACTITIONER

## 2020-04-22 PROCEDURE — 80053 COMPREHEN METABOLIC PANEL: CPT | Performed by: NURSE PRACTITIONER

## 2020-04-22 PROCEDURE — 63710000001 INSULIN LISPRO (HUMAN) PER 5 UNITS: Performed by: NURSE PRACTITIONER

## 2020-04-22 RX ORDER — CEFDINIR 300 MG/1
300 CAPSULE ORAL 2 TIMES DAILY
Qty: 10 CAPSULE | Refills: 0 | Status: SHIPPED | OUTPATIENT
Start: 2020-04-23 | End: 2020-04-28

## 2020-04-22 RX ORDER — DOXYCYCLINE 100 MG/1
100 TABLET ORAL 2 TIMES DAILY WITH MEALS
Qty: 11 TABLET | Refills: 0 | Status: SHIPPED | OUTPATIENT
Start: 2020-04-22 | End: 2022-01-03

## 2020-04-22 RX ORDER — ISOPROPYL ALCOHOL 700 MG/ML
1 CLOTH TOPICAL
Qty: 200 EACH | Refills: 0 | Status: SHIPPED | OUTPATIENT
Start: 2020-04-22 | End: 2022-01-03

## 2020-04-22 RX ORDER — DOXYCYCLINE 100 MG/1
100 TABLET ORAL 2 TIMES DAILY WITH MEALS
Status: DISCONTINUED | OUTPATIENT
Start: 2020-04-22 | End: 2020-04-22 | Stop reason: HOSPADM

## 2020-04-22 RX ORDER — LANCETS 30 GAUGE
1 EACH MISCELLANEOUS
Qty: 200 EACH | Refills: 0 | Status: SHIPPED | OUTPATIENT
Start: 2020-04-22 | End: 2022-01-03

## 2020-04-22 RX ORDER — PEN NEEDLE, DIABETIC 30 GX3/16"
1 NEEDLE, DISPOSABLE MISCELLANEOUS DAILY
Qty: 100 EACH | Refills: 2 | Status: SHIPPED | OUTPATIENT
Start: 2020-04-22 | End: 2022-01-03

## 2020-04-22 RX ADMIN — INSULIN LISPRO 4 UNITS: 100 INJECTION, SOLUTION INTRAVENOUS; SUBCUTANEOUS at 08:57

## 2020-04-22 RX ADMIN — INSULIN LISPRO 6 UNITS: 100 INJECTION, SOLUTION INTRAVENOUS; SUBCUTANEOUS at 12:54

## 2020-04-22 RX ADMIN — DOXYCYCLINE 100 MG: 100 TABLET, FILM COATED ORAL at 08:56

## 2020-04-22 RX ADMIN — Medication 10 ML: at 09:15

## 2020-04-22 RX ADMIN — MODAFINIL 100 MG: 100 TABLET ORAL at 08:56

## 2020-04-22 RX ADMIN — GUAIFENESIN 1200 MG: 600 TABLET, EXTENDED RELEASE ORAL at 08:56

## 2020-04-22 RX ADMIN — ACETAMINOPHEN 650 MG: 325 TABLET, FILM COATED ORAL at 09:04

## 2020-04-22 RX ADMIN — BUDESONIDE AND FORMOTEROL FUMARATE DIHYDRATE 1 PUFF: 160; 4.5 AEROSOL RESPIRATORY (INHALATION) at 07:10

## 2020-04-22 RX ADMIN — HYDROCHLOROTHIAZIDE: 12.5 TABLET ORAL at 08:56

## 2020-04-22 RX ADMIN — CEFTRIAXONE SODIUM 1 G: 1 INJECTION, POWDER, FOR SOLUTION INTRAMUSCULAR; INTRAVENOUS at 08:57

## 2020-04-22 NOTE — PROGRESS NOTES
Discharge Planning Assessment   [place]     Patient Name: Montez Guy  MRN: [id number]  Today's Date: 4/22/2020    Admit Date: 4/21/2020    Discharge Needs Assessment     Row Name 04/22/20 1541       Living Environment    Lives With  spouse;child(sophie), adult    Current Living Arrangements  home/apartment/condo    Primary Care Provided by  self    Provides Primary Care For  no one    Family Caregiver if Needed  spouse    Able to Return to Prior Arrangements  yes       Resource/Environmental Concerns    Resource/Environmental Concerns  none       Transition Planning    Patient/Family Anticipates Transition to  home with family    Patient/Family Anticipated Services at Transition  none    Transportation Anticipated  family or friend will provide       Discharge Needs Assessment    Readmission Within the Last 30 Days  no previous admission in last 30 days    Concerns to be Addressed  no discharge needs identified    Equipment Currently Used at Home  none    Equipment Needed After Discharge  none    Discharge Coordination/Progress  DC Plan: Home wih spouse.                     Expected Discharge Date and Time     Expected Discharge Date Expected Discharge Time    Apr 22, 2020         Demographic Summary     Row Name 04/22/20 1541       General Information    Admission Type  observation    Arrived From  emergency department    Referral Source  admission list    Reason for Consult  discharge planning    Preferred Language  English     Used During This Interaction  bailey Oroukre RN

## 2020-04-22 NOTE — DISCHARGE SUMMARY
Date of Admission: 4/21/2020    Date of Discharge:  4/22/2020    Length of stay:  LOS: 0 days     Admission Diagnosis:   Strep pharyngitis [J02.0]  Pneumonia of right lower lobe due to infectious organism (CMS/[facility]) [J18.1]  Fever, unspecified fever cause [R50.9]  Dyspnea, unspecified type [R06.00]  Hematuria, unspecified type [R31.9]      Discharge Diagnosis:     Sepsis d/t CAP with Strep pharyngitis and possible COVID 19   - Strep A ag positive  - RVP negative  - COVID-19 - negative   - blood cultures- NGTD  - strep and legionella antigens negative  - continue omnicef and doxycycline for 7 days total      Type 2 diabetes mellitus with hyperglycemia, without long-term current use of insulin   - patient denies history of DM and states secondary to infection only; not on any home medication for diabetes  - Hgb A1c 9.4  - start Lants 10 units nightly  - start metformin   - pt spoke with diabetes educator, all supplies sent      Benign essential hypertension  - moderately controlled  - continue lisinopril-HCTZ     Obstructive sleep apnea syndrome  - patient reports compliance with nocturnal CPAP  - continue Provigil      Obesity (BMI 30-39.9)  - encourage lifestyle modifications      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         Active Hospital Problems    Diagnosis  POA   • **Pneumonia involving right lung [J18.9]  Yes   • Fever [R50.9]  Yes   • Streptococcal infection group A [B95.0]  Yes   • Sepsis (CMS/[facility]) [A41.9]  Yes   • Below-knee amputation of right lower extremity (CMS/[facility]) [S88.111A]  Yes   • Type 2 diabetes mellitus with hyperglycemia, without long-term current use of insulin (CMS/[facility]) [E11.65]  Yes   • Benign essential hypertension [I10]  Yes   • Obesity (BMI 30-39.9) [E66.9]  Yes   • Obstructive sleep apnea syndrome [G47.33]  Yes      Resolved Hospital Problems    Diagnosis Date Resolved POA   • Suspected Covid-19 Virus Infection [R68.89] 04/22/2020 Yes              Hospital Course:  Patient is a 50 y/o male who presented to the ED complaining of 3 day history of worsening body aches, fatigue, productive cough and sore throat. Patient states last night he developed a fever up to 105F. He did not take any medications to reduce his fever prior to coming in. He also reports some associated nausea and vomiting but denies any diarrhea. He has had no sick contacts.   Patient was found to have strep pharyngitis and community acquired pneumonia. He has been afebrile since starting antibiotics and he will be continued on oral antibiotics for a total of 7 days.   He was again noted to have diabetes and met with diabetes educator. He will be sent prescriptions for metformin and Lantus, as well as, all testing supplies. Will need to follow up with PCP in one week.      Procedures Performed:         Consults:   Consults     Date and Time Order Name Status Description    4/21/2020 1251 Hospitalist (on-call MD unless specified) Completed           Vital Signs:  Temp:  [98.2 °F (36.8 °C)-98.8 °F (37.1 °C)] 98.4 °F (36.9 °C)  Heart Rate:  [68-78] 74  Resp:  [16-22] 22  BP: (123-142)/(78-84) 123/78        Physical Exam:  Physical Exam   Constitutional: He appears well-developed. No distress.   Obese male    HENT:   Head: Normocephalic and atraumatic.   Mouth/Throat: Oropharynx is clear and moist.   Cardiovascular: Normal rate and regular rhythm.   Pulmonary/Chest: Effort normal. No stridor. No respiratory distress. He has no wheezes.   Abdominal: Soft. Bowel sounds are normal.   Neurological: He is alert.   Skin: Skin is warm.   Psychiatric: He has a normal mood and affect. His behavior is normal.   Vitals reviewed.          Pertinent Test Results:   Lab Results (last 72 hours)     Procedure Component Value Units Date/Time    POC Glucose Once [[id number]]  (Abnormal) Collected:  04/22/20 1155    Specimen:  Blood Updated:  04/22/20 1156     Glucose 270 mg/dL      Comment: Serial Number:  [id number]:  [id number]       Hemoglobin A1c [[id number]]  (Abnormal) Collected:  04/21/20 1007    Specimen:  Blood Updated:  04/22/20 1112     Hemoglobin A1C 9.4 %     Narrative:       Hemoglobin A1C Reference Range:    <5.7 %        Normal  5.7-6.4 %     Increased risk for diabetes  > 6.4 %        Diabetes       These guidelines have been recommended by the American Diabetic Association for Hgb A1c.      The following 2010 guidelines have been recommended by the American Diabetes Association for Hemoglobin A1c.    HBA1c 5.7-6.4% Increased risk for future diabetes (pre-diabetes)  HBA1c     >6.4% Diabetes      Blood Culture - Blood, Arm, Left [[id number]] Collected:  04/21/20 1006    Specimen:  Blood from Arm, Left Updated:  04/22/20 1015     Blood Culture No growth at 24 hours    Blood Culture - Blood, Blood, Venous Line [[id number]] Collected:  04/21/20 1007    Specimen:  Blood, Venous Line Updated:  04/22/20 1015     Blood Culture No growth at 24 hours    POC Glucose Once [[id number]]  (Abnormal) Collected:  04/22/20 0736    Specimen:  Blood Updated:  04/22/20 0737     Glucose 244 mg/dL      Comment: Serial Number: [id number]:  [id number]       Procalcitonin [[id number]]  (Abnormal) Collected:  04/22/20 0353    Specimen:  Blood Updated:  04/22/20 0458     Procalcitonin 0.59 ng/mL     Narrative:       As a Marker for Sepsis (Non-Neonates):   1. <0.5 ng/mL represents a low risk of severe sepsis and/or septic shock.  1. >2 ng/mL represents a high risk of severe sepsis and/or septic shock.    As a Marker for Lower Respiratory Tract Infections that require antibiotic therapy:  PCT on Admission     Antibiotic Therapy             6-12 Hrs later  > 0.5                Strongly Recommended            >0.25 - <0.5         Recommended  0.1 - 0.25           Discouraged                   Remeasure/reassess PCT  <0.1                 Strongly Discouraged          Remeasure/reassess PCT      As 28 day mortality risk  "marker: \"Change in Procalcitonin Result\" (> 80 % or <=80 %) if Day 0 (or Day 1) and Day 4 values are available. Refer to [email or web address]   Change in PCT <=80 %   A decrease of PCT levels below or equal to 80 % defines a positive change in PCT test result representing a higher risk for 28-day all-cause mortality of patients diagnosed with severe sepsis or septic shock.  Change in PCT > 80 %   A decrease of PCT levels of more than 80 % defines a negative change in PCT result representing a lower risk for 28-day all-cause mortality of patients diagnosed with severe sepsis or septic shock.                Results may be falsely decreased if patient taking Biotin.     Comprehensive Metabolic Panel [[id number]]  (Abnormal) Collected:  04/22/20 0353    Specimen:  Blood Updated:  04/22/20 0456     Glucose 240 mg/dL      BUN 14 mg/dL      Creatinine 1.09 mg/dL      Sodium 135 mmol/L      Potassium 4.8 mmol/L      Chloride 97 mmol/L      CO2 27.0 mmol/L      Calcium 9.0 mg/dL      Total Protein 7.0 g/dL      Albumin 4.00 g/dL      ALT (SGPT) 27 U/L      AST (SGOT) 27 U/L      Comment: Specimen hemolyzed.  Results may be affected.        Alkaline Phosphatase 77 U/L      Total Bilirubin 1.6 mg/dL      eGFR Non African Amer 72 mL/min/1.73      Globulin 3.0 gm/dL      A/G Ratio 1.3 g/dL      BUN/Creatinine Ratio 12.8     Anion Gap 11.0 mmol/L     Narrative:       GFR Normal >60  Chronic Kidney Disease <60  Kidney Failure <15      Magnesium [[id number]]  (Normal) Collected:  04/22/20 0353    Specimen:  Blood Updated:  04/22/20 0454     Magnesium 1.8 mg/dL     Lipase [[id number]]  (Abnormal) Collected:  04/22/20 0353    Specimen:  Blood Updated:  04/22/20 0454     Lipase 85 U/L     CBC Auto Differential [[id number]]  (Abnormal) Collected:  04/22/20 0353    Specimen:  Blood Updated:  04/22/20 0432     WBC 7.20 10*3/mm3      RBC 5.11 10*6/mm3      Hemoglobin 14.6 g/dL      Hematocrit 41.5 %      MCV 81.2 fL      MCH " 28.6 pg      MCHC 35.2 g/dL      RDW 16.1 %      RDW-SD 45.5 fl      MPV 8.6 fL      Platelets 121 10*3/mm3      Neutrophil % 65.6 %      Lymphocyte % 16.2 %      Monocyte % 14.6 %      Eosinophil % 2.4 %      Basophil % 1.2 %      Neutrophils, Absolute 4.70 10*3/mm3      Lymphocytes, Absolute 1.20 10*3/mm3      Monocytes, Absolute 1.10 10*3/mm3      Eosinophils, Absolute 0.20 10*3/mm3      Basophils, Absolute 0.10 10*3/mm3      nRBC 0.5 /100 WBC     SARS-CoV-2 PCR ([place] IN-HOUSE PERFORMED), NP SWAB IN TRANSPORT MEDIA - Swab, Nasopharynx [[id number]]  (Normal) Collected:  04/21/20 1007    Specimen:  Swab from Nasopharynx Updated:  04/21/20 1756     COVID19 Not Detected    S. Pneumo Ag Urine or CSF - Urine, Urine, Clean Catch [[id number]]  (Normal) Collected:  04/21/20 1008    Specimen:  Urine, Clean Catch Updated:  04/21/20 1602     Strep Pneumo Ag Negative    Legionella Antigen, Urine - Urine, Urine, Clean Catch [[id number]]  (Normal) Collected:  04/21/20 1008    Specimen:  Urine, Clean Catch Updated:  04/21/20 1602     LEGIONELLA ANTIGEN, URINE Negative    POC Glucose Once [[id number]]  (Abnormal) Collected:  04/21/20 1541    Specimen:  Blood Updated:  04/21/20 1542     Glucose 188 mg/dL      Comment: Serial Number: [id number]:  [id number]       Respiratory Panel, PCR - Swab, Nasopharynx [[id number]]  (Normal) Collected:  04/21/20 1007    Specimen:  Swab from Nasopharynx Updated:  04/21/20 1136     ADENOVIRUS, PCR Not Detected     Coronavirus 229E Not Detected     Coronavirus HKU1 Not Detected     Coronavirus NL63 Not Detected     Coronavirus OC43 Not Detected     Human Metapneumovirus Not Detected     Human Rhinovirus/Enterovirus Not Detected     Influenza B PCR Not Detected     Parainfluenza Virus 1 Not Detected     Parainfluenza Virus 2 Not Detected     Parainfluenza Virus 3 Not Detected     Parainfluenza Virus 4 Not Detected     Bordetella pertussis pcr Not Detected     Influenza A H1 2009 PCR Not  Detected     Chlamydophila pneumoniae PCR Not Detected     Mycoplasma pneumo by PCR Not Detected     Influenza A PCR Not Detected     Influenza A H3 Not Detected     Influenza A H1 Not Detected     RSV, PCR Not Detected    Narrative:       The coronavirus on the RVP is NOT COVID-19 and is NOT indicative of infection with COVID-19.     Lactate Dehydrogenase [[id number]]  (Abnormal) Collected:  04/21/20 1007    Specimen:  Blood Updated:  04/21/20 1117      U/L      Comment: Specimen hemolyzed.  Results may be affected.       C-reactive Protein [[id number]]  (Abnormal) Collected:  04/21/20 1007    Specimen:  Blood Updated:  04/21/20 1115     C-Reactive Protein 2.69 mg/dL     Comprehensive Metabolic Panel [[id number]]  (Abnormal) Collected:  04/21/20 1007    Specimen:  Blood Updated:  04/21/20 1107     Glucose 322 mg/dL      BUN 20 mg/dL      Creatinine 1.27 mg/dL      Sodium 131 mmol/L      Potassium 4.9 mmol/L      Chloride 97 mmol/L      CO2 21.0 mmol/L      Calcium 9.5 mg/dL      Total Protein 7.3 g/dL      Albumin 3.80 g/dL      ALT (SGPT) 31 U/L      AST (SGOT) 36 U/L      Comment: Specimen hemolyzed.  Results may be affected.        Alkaline Phosphatase 83 U/L      Total Bilirubin 1.3 mg/dL      eGFR Non African Amer 61 mL/min/1.73      Globulin 3.5 gm/dL      A/G Ratio 1.1 g/dL      BUN/Creatinine Ratio 15.7     Anion Gap 13.0 mmol/L     Narrative:       GFR Normal >60  Chronic Kidney Disease <60  Kidney Failure <15      CBC & Differential [[id number]] Collected:  04/21/20 1007    Specimen:  Blood Updated:  04/21/20 1045    Narrative:       The following orders were created for panel order CBC & Differential.  Procedure                               Abnormality         Status                     ---------                               -----------         ------                     CBC Auto Differential[[id number]]        Abnormal            Final result                 Please view results for these tests on  "the individual orders.    CBC Auto Differential [[id number]]  (Abnormal) Collected:  04/21/20 1007    Specimen:  Blood Updated:  04/21/20 1045     WBC 11.00 10*3/mm3      RBC 5.44 10*6/mm3      Hemoglobin 14.7 g/dL      Hematocrit 43.1 %      MCV 79.3 fL      MCH 27.0 pg      MCHC 34.0 g/dL      RDW 15.6 %      RDW-SD 44.2 fl      MPV 8.7 fL      Platelets 134 10*3/mm3      Neutrophil % 83.8 %      Lymphocyte % 6.9 %      Monocyte % 7.9 %      Eosinophil % 0.3 %      Basophil % 1.1 %      Neutrophils, Absolute 9.20 10*3/mm3      Lymphocytes, Absolute 0.80 10*3/mm3      Monocytes, Absolute 0.90 10*3/mm3      Eosinophils, Absolute 0.00 10*3/mm3      Basophils, Absolute 0.10 10*3/mm3      nRBC 0.5 /100 WBC     Narrative:       Appended report. These results have been appended to a previously verified report.    Scan Slide [[id number]] Collected:  04/21/20 1007    Specimen:  Blood Updated:  04/21/20 1045     RBC Morphology Normal     WBC Morphology Normal     Platelet Estimate Adequate    Narrative:       Slide Reviewed      Procalcitonin [[id number]]  (Abnormal) Collected:  04/21/20 1007    Specimen:  Blood Updated:  04/21/20 1043     Procalcitonin 0.55 ng/mL     Narrative:       As a Marker for Sepsis (Non-Neonates):   1. <0.5 ng/mL represents a low risk of severe sepsis and/or septic shock.  1. >2 ng/mL represents a high risk of severe sepsis and/or septic shock.    As a Marker for Lower Respiratory Tract Infections that require antibiotic therapy:  PCT on Admission     Antibiotic Therapy             6-12 Hrs later  > 0.5                Strongly Recommended            >0.25 - <0.5         Recommended  0.1 - 0.25           Discouraged                   Remeasure/reassess PCT  <0.1                 Strongly Discouraged          Remeasure/reassess PCT      As 28 day mortality risk marker: \"Change in Procalcitonin Result\" (> 80 % or <=80 %) if Day 0 (or Day 1) and Day 4 values are available. Refer to " [email or web address]/   Change in PCT <=80 %   A decrease of PCT levels below or equal to 80 % defines a positive change in PCT test result representing a higher risk for 28-day all-cause mortality of patients diagnosed with severe sepsis or septic shock.  Change in PCT > 80 %   A decrease of PCT levels of more than 80 % defines a negative change in PCT result representing a lower risk for 28-day all-cause mortality of patients diagnosed with severe sepsis or septic shock.                Results may be falsely decreased if patient taking Biotin.     Ferritin [[id number]]  (Normal) Collected:  04/21/20 1007    Specimen:  Blood Updated:  04/21/20 1042     Ferritin 258.00 ng/mL     Narrative:       Results may be falsely decreased if patient taking Biotin.      Troponin [[id number]]  (Normal) Collected:  04/21/20 1007    Specimen:  Blood Updated:  04/21/20 1042     Troponin T <0.010 ng/mL     Narrative:       Troponin T Reference Range:  <= 0.03 ng/mL-   Negative for AMI  >0.03 ng/mL-     Abnormal for myocardial necrosis.  Clinicians would have to utilize clinical acumen, EKG, Troponin and serial changes to determine if it is an Acute Myocardial Infarction or myocardial injury due to an underlying chronic condition.       Results may be falsely decreased if patient taking Biotin.      BNP [[id number]]  (Normal) Collected:  04/21/20 1007    Specimen:  Blood Updated:  04/21/20 1042     proBNP 95.0 pg/mL     Narrative:       Among patients with dyspnea, NT-proBNP is highly sensitive for the detection of acute congestive heart failure. In addition NT-proBNP of <300 pg/ml effectively rules out acute congestive heart failure with 99% negative predictive value.    Results may be falsely decreased if patient taking Biotin.      Lipase [[id number]]  (Abnormal) Collected:  04/21/20 1007    Specimen:  Blood Updated:  04/21/20 1038     Lipase 67 U/L     D-dimer, Quantitative [[id number]]  (Abnormal) Collected:  04/21/20  1007    Specimen:  Blood Updated:  04/21/20 1027     D-Dimer, Quantitative 1.21 MCGFEU/mL     Narrative:       Reference Range  --------------------------------------------------------------------     < 0.50   Negative Predictive Value  0.50-0.59   Indeterminate    >= 0.60   Probable VTE             A very low percentage of patients with DVT may yield D-Dimer results   below the cut-off of 0.50 MCGFEU/mL.  This is known to be more   prevalent in patients with distal DVT.             Results of this test should always be interpreted in conjunction with   the patient's medical history, clinical presentation and other   findings.  Clinical diagnosis should not be based on the result of   [place] D-Dimer alone.    Rapid Strep A Screen - Swab, Throat [[id number]]  (Abnormal) Collected:  04/21/20 1007    Specimen:  Swab from Throat Updated:  04/21/20 1019     Strep A Ag Positive    Urinalysis With Microscopic If Indicated (No Culture) - Urine, Clean Catch [[id number]]  (Abnormal) Collected:  04/21/20 1008    Specimen:  Urine, Clean Catch Updated:  04/21/20 1018     Color, UA Yellow     Appearance, UA Clear     pH, UA 6.0     Specific Gravity, UA 1.019     Glucose,  mg/dL (2+)     Ketones, UA Negative     Bilirubin, UA Negative     Blood, UA Large (3+)     Protein, UA >=300 mg/dL (3+)     Leuk Esterase, UA Negative     Nitrite, UA Negative     Urobilinogen, UA 0.2 E.U./dL    Urinalysis, Microscopic Only - Urine, Clean Catch [[id number]]  (Abnormal) Collected:  04/21/20 1008    Specimen:  Urine, Clean Catch Updated:  04/21/20 1018     RBC, UA 13-20 /HPF      WBC, UA 0-2 /HPF      Bacteria, UA None Seen /HPF      Squamous Epithelial Cells, UA 0-2 /HPF      Hyaline Casts, UA 3-6 /LPF      Methodology Automated Microscopy    POC Lactate [[id number]]  (Normal) Collected:  04/21/20 1017    Specimen:  Blood Updated:  04/21/20 1017    Blood Gas, Arterial [[id number]]  (Abnormal) Collected:  04/21/20 1012    Specimen:  Arterial  Blood Updated:  04/21/20 1016     Site Right Radial     René's Test Positive     pH, Arterial 7.410 pH units      pCO2, Arterial 36.0 mm Hg      pO2, Arterial 65.1 mm Hg      HCO3, Arterial 22.8 mmol/L      Base Excess, Arterial -1.3 mmol/L      Comment: Serial Number: [id number]:  [id number]        O2 Saturation, Arterial 92.8 %      CO2 Content 23.9 mmol/L      Barometric Pressure for Blood Gas --     Comment: N/A        Modality Room Air     FIO2 21 %      Hemodilution No    POC Lactate [[id number]]  (Normal) Collected:  04/21/20 1008    Specimen:  Blood Updated:  04/21/20 1009     Lactate 1.6 mmol/L      Comment: Serial Number: [id number]:  [id number]                  Blood Culture   Date Value Ref Range Status   04/21/2020 No growth at 24 hours  Preliminary   04/21/2020 No growth at 24 hours  Preliminary       Imaging Results (All)     Procedure Component Value Units Date/Time    CT Chest Pulmonary Embolism [[id number]] Collected:  04/21/20 1201     Updated:  04/21/20 1224    Narrative:          DATE OF EXAM:  4/21/2020 11:30 AM     PROCEDURE:  CT CHEST PULMONARY EMBOLISM-     INDICATIONS:   Fever, shortness of air, cough, elevated d-dimer.     COMPARISON:   Chest radiograph performed the same date. CT chest 09/27/2018 and  02/03/2017.     TECHNIQUE:  Routine transaxial slices were obtained through chest after  administration of intravenous 100 ml of Isovue 370. Reconstructed  coronal and sagittal images were also obtained. Automated exposure  control and iterative reconstruction methods were used.      FINDINGS:  Opacification of the pulmonary arteries appears diagnostic. The central  pulmonary arteries appear mildly enlarged. No definite acute pulmonary  embolism is identified. No pneumothorax is seen.     There is prominence of interstitial markings, similar to the prior exam.  There is suspected mild emphysema. There are subpleural opacities with  predominant linear morphology, as before. 4 mm subpleural  nodule in the  left lower lobe on image 90 is stable. The irregular subpleural opacity  in the anterior-lateral right middle lobe appears similar to the prior  exam. The previously seen 4.5 cm density in the inferior right  upper/superior right middle lobe has decreased in density with now focal  bronchial dilatation seen in this location. The cystic area in the left  upper lobe appears grossly unchanged.     There are new focal groundglass opacities seen in the medial right lower  lobe (axial image 78 and to a lesser extent the inferior and medial  right upper lobe (axial image 55.     Heart size appears mildly enlarged. No pericardial effusion. There is  mild atherosclerosis of the aorta and branch vessels. No acute aortic  abnormality is seen. There are calcified right hilar and subcarinal  lymph nodes, as before. No definite new thoracic adenopathy is seen at  this time. See separate report for evaluation of the abdomen and pelvis.  Degenerative changes are present in the thoracic spine. There appear to  be chronic changes at the bilateral inferior glenoid/lateral scapular  bodies.       Impression:       1.No definite findings of acute pulmonary embolism.  2.New focal groundglass opacities in the medial right lower and  inferior-medial right upper lobes. This appearance is more suggestive of  community-acquired or aspiration pneumonia and is not considered typical  for COVID 19 pneumonia, although this cannot be excluded by imaging.  3.Cardiomegaly with prominent central pulmonary vasculature suggesting  pulmonary artery hypertension.  4. Emphysema with chronic changes which appear similar to 2018 except  for decreased opacity in the inferior right upper/right middle lobe  where there is now focal bronchial dilatation.  5.Please see separate report for evaluation of the abdomen and pelvis.     Electronically Signed By-DR. Edi Lezama MD On:4/21/2020 12:20 PM  This report was finalized on [id number] by   Edi Lezama MD.    CT Abdomen Pelvis With Contrast [[id number]] Collected:  04/21/20 1206     Updated:  04/21/20 1211    Narrative:       CT ABDOMEN PELVIS W CONTRAST-     Date of Exam: 4/21/2020 11:30 AM     Indication: abdp ain; R50.9-Fever, unspecified; R06.00-Dyspnea,  unspecified; J02.0-Streptococcal pharyngitis; R31.9-Hematuria,  unspecified.  Elevated d-dimer, possible infection     Comparison: CT 09/27/2018     Technique: Contiguous axial CT images were obtained from the lung bases  to the superior iliac crests without contrast.  Following uneventful  administration of 100 cc of Isovue-370 intravenous and oral contrast,  contiguous axial images obtained from lung bases to the pubic symphysis.  Sagittal and coronal reconstructions were performed.  Automated exposure  control and iterative reconstruction methods were used.     FINDINGS:  Lower Thorax: Patchy groundglass consolidative changes are seen within  the medial aspect of the right lower lobe. Scarring is present within  the right middle lobe.     Liver: Normal size and density. No focal lesions identified.     Gallbladder: The gallbladder appears normal in size with no evidence of  radiopaque gallstones. The biliary tree is nondilated.     Pancreas: No focal masses.  No pancreatic duct dilation.     Spleen: Spleen is normal size.  No focal splenic lesions.     Adrenal glands: A hypodense nodule is seen arising from the left adrenal  gland, similar as compared to the previous study, likely representing an  adenoma.      Kidneys: The kidneys appear normal in size. No evidence of  nephrolithiasis. No evidence of hydronephrosis. No suspicious focal  lesions identified. Probable bilateral renal cysts are present.     Retroperitoneum/vascular: No retroperitoneal adenopathy identified. No  aneurysmal dilatation of the vascular structures.     Stomach and Bowel: No abnormally dilated loops of bowel no definite mass  identified. No significant bowel wall  thickening. No free fluid. No  focal fluid collections identified. The appendix appears within normal  limits. No mesenteric adenopathy identified. No evidence of free air. A  small to moderate stool burden is present.     Bladder: The bladder appears unremarkable.     Pelvic Organs: No acute process identified.     Osseous structures: No aggressive focal lytic or sclerotic osseous  lesions. No acute osseous abnormality.       Impression:          1. No acute intra-abdominal or intrapelvic process.  2. Groundglass appearing consolidation within the medial right lung  base, likely related to pneumonia. Acute viral illness cannot be  excluded.  3. Ancillary findings as described above.     Electronically Signed BySusan Clarke On:4/21/2020 12:09 PM  This report was finalized on [id number] by  Joselin Clarke, .    XR Chest 1 View [[id number]] Collected:  04/21/20 1011     Updated:  04/21/20 1014    Narrative:       Examination: XR CHEST 1 VW-     Date of Exam: 4/21/2020 9:45 AM     Indication: soa, fever; R50.9-Fever, unspecified; R06.00-Dyspnea,  unspecified.  Shortness of air, fever      Comparison: Radiograph 01/16/2020     Technique: 1 view of the chest      Findings:  There are no airspace consolidations. No pleural effusions. No  pneumothorax. The heart size is normal. The pulmonary vasculature  appears within normal limits. Stable scarring is present within the lung  bases bilaterally, right greater than left. No acute osseous abnormality  identified.       Impression:       No acute cardiopulmonary process.     Electronically Signed BySusan Clarke On:4/21/2020 10:11 AM  This report was finalized on [id number] by  Joselin Clarke, .                Discharge Disposition:  Home or Self Care    Discharge Medications:     Discharge Medications      New Medications      Instructions Start Date   Alcohol Wipes 70 % misc   1 each, Apply externally, 4 Times Daily Before Meals & Nightly      cefdinir 300 MG  capsule  Commonly known as:  OMNICEF   300 mg, Oral, 2 Times Daily   Start Date:  April 23, 2020     doxycycline 100 MG tablet  Commonly known as:  ADOXA   100 mg, Oral, 2 Times Daily With Meals      glucose blood test strip  Commonly known as:  OneTouch Verio   1 each, Other, 4 Times Daily Before Meals & Nightly, Use as instructed      Insulin Glargine 100 UNIT/ML injection pen  Commonly known as:  LANTUS SOLOSTAR   10 Units, Subcutaneous, Nightly      metFORMIN 500 MG tablet  Commonly known as:  GLUCOPHAGE   Take 0.5 tabs po bid x 2 weeks, then start taking 1 tab po bid.      OneTouch Delica Plus Pufhhx10H misc   1 each, Does not apply, 4 Times Daily Before Meals & Nightly      Pen Needles 32G X 4 MM misc   1 each, Does not apply, Daily         Continue These Medications      Instructions Start Date   aspirin-acetaminophen-caffeine 250-250-65 MG per tablet  Commonly known as:  EXCEDRIN MIGRAINE   2 tablets, Oral, Every 6 Hours PRN      budesonide-formoterol 160-4.5 MCG/ACT inhaler  Commonly known as:  SYMBICORT   1 puff, Inhalation, 2 Times Daily PRN      lisinopril-hydrochlorothiazide 10-12.5 MG per tablet  Commonly known as:  PRINZIDE,ZESTORETIC   1 tablet, Oral, Daily      modafinil 100 MG tablet  Commonly known as:  PROVIGIL   100 mg, Oral, Daily, RX is for BID, pt just takes QD             Discharge Diet:   Diet Instructions     Diet: Consistent Carbohydrate      Discharge Diet:  Consistent Carbohydrate        Diabetic diet                  Activity at Discharge:   Activity Instructions     Activity as Tolerated      Additional Activity Instructions:      Activity as tolerated                  Follow-up Appointments:  No future appointments.      Test Results Pending at Discharge:  Final blood cultures     Condition on Discharge:    Stable      Risk for Readmission (LACE): Score: 3 (4/22/2020  6:00 AM)          Jose Rush DO  04/22/20  14:31

## 2020-04-22 NOTE — PLAN OF CARE
Problem: Patient Care Overview  Goal: Plan of Care Review  Outcome: Ongoing (interventions implemented as appropriate)  Flowsheets  Taken 4/22/2020 0237  Progress: improving  Outcome Summary: Pt transferred to unit from [facility], has slept well through the night no c/o pain.  Taken 4/21/2020 3618  Plan of Care Reviewed With: patient

## 2020-04-22 NOTE — CONSULTS
Diabetes Education    Patient Name:  Montez Guy  YOB: 1970  MRN: [id number]  Admit Date:  4/21/2020        Follow-up with patient to give glucometer and insulin training.  One Touch Verio Flex given to patient with patient doing return demonstration of using the lancing device, inserting the test strip into the meter, and applying blood to the test strip. Blood sugar 281. Log book given to patient with discussion of checking blood sugars before meals and at bedtime.  Sample box of 10 test strips given to patient. Handouts given with discussion on types of insulin, storage of insulin, disposal of needles, injection sites, rotation of sites, and how to use an insulin pen.  Patient did return demonstration of applying the pen needle to the pen, priming the pen, administering the shot into the foam pad, and holding the pen for 10 seconds after administration. Handouts given with discussion on hypoglycemia and hyperglycemia signs, symptoms, and treatment.  Patient states he needs to work on his diet and said he would get info from the Internet. Recommended to patient that he use trusted web sites like the ADA or Check web sites for info.Prescriptions started in discharge orders for lancets, test strips, and alcohol wipes. No further questions or needs related to diabetes at this time.      Electronically signed by:  Nita Bejarano RN  04/22/20 14:34

## 2020-04-23 ENCOUNTER — READMISSION MANAGEMENT (OUTPATIENT)
Dept: CALL CENTER | Facility: HOSPITAL | Age: 50
End: 2020-04-23

## 2020-04-23 ENCOUNTER — TELEPHONE (OUTPATIENT)
Dept: DIABETES SERVICES | Facility: HOSPITAL | Age: 50
End: 2020-04-23

## 2020-04-23 NOTE — OUTREACH NOTE
COPD/PN Week 1 Survey      Responses   [facility] patient discharged from?  [facility]   COVID-19 Test Status  Negative   Does the patient have one of the following disease processes/diagnoses(primary or secondary)?  COPD/Pneumonia   Is there a successful TCM telephone encounter documented?  No   Was the primary reason for admission:  Pneumonia   Week 1 attempt successful?  No   Unsuccessful attempts  Attempt 1          Celena Lau RN

## 2020-04-23 NOTE — OUTREACH NOTE
Prep Survey      Responses   [facility]?  [facility]   Is LACE score < 7 ?  No   Eligibility  Readm Mgmt   Discharge diagnosis  Sepsis d/t CAP with Strep pharyngitis    COVID-19 Test Status  Negative   Does the patient have one of the following disease processes/diagnoses(primary or secondary)?  COPD/Pneumonia   Does the patient have Home health ordered?  No   Is there a DME ordered?  No   Prep survey completed?  Yes          Ca Villarreal RN

## 2020-04-24 ENCOUNTER — READMISSION MANAGEMENT (OUTPATIENT)
Dept: CALL CENTER | Facility: HOSPITAL | Age: 50
End: 2020-04-24

## 2020-04-24 NOTE — OUTREACH NOTE
COPD/PN Week 1 Survey      Responses   [facility] patient discharged from?  [facility]   COVID-19 Test Status  Negative   Does the patient have one of the following disease processes/diagnoses(primary or secondary)?  COPD/Pneumonia   Is there a successful TCM telephone encounter documented?  No   Was the primary reason for admission:  Pneumonia   Week 1 attempt successful?  Yes   Call start time  0939   Call end time  0948   Is patient permission given to speak with other caregiver?  Yes   List who call center can speak with  Lata   Person spoke with today (if not patient) and relationship  wife-Lata   Meds reviewed with patient/caregiver?  Yes   Is the patient having any side effects they believe may be caused by any medication additions or changes?  No   Does the patient have all medications ordered at discharge?  Yes   Is the patient taking all medications as directed (includes completed medication regime)?  Yes   Comments regarding appointments  States will call PCP office today for appt.   Does the patient have a primary care provider?   Yes   Does the patient have an appointment with their PCP or pulmonologist within 7 days of discharge?  No   What is preventing the patient from scheduling follow up appointments within 7 days of discharge?  Haven't had time   Nursing Interventions  Advised patient to make appointment   Has the patient kept scheduled appointments due by today?  N/A   Has home health visited the patient within 72 hours of discharge?  N/A   Psychosocial issues?  No   Did the patient receive a copy of their discharge instructions?  Yes   Nursing interventions  Reviewed instructions with patient   What is the patient's perception of their health status since discharge?  Improving   Nursing Interventions  Nurse provided patient education   Are the patient's immunizations up to date?   No   Nursing interventions  Educated on importance of maintaining up to date immunizations as advised by provider    Is the patient/caregiver able to teach back the hierarchy of who to call/visit for symptoms/problems? PCP, Specialist, Home health nurse, Urgent Care, ED, 911  Yes   Additional teach back comments  Reviewed s/s of COVID-19 and worsening pneumonia.   Is the patient/caregiver able to teach back signs and symptoms of worsening condition:  Fever/chills, Shortness of breath, Chest pain   Is the patient/caregiver able to teach back importance of completing antibiotic course of treatment?  Yes   Week 1 call completed?  Yes   Wrap up additional comments  States is feeling better-denies any fever, chills, SOA, cough, or chest pain. States BS running at 200-will discuss with PCP. Denies any problems today.          Celena Lau RN

## 2020-04-25 ENCOUNTER — READMISSION MANAGEMENT (OUTPATIENT)
Dept: CALL CENTER | Facility: HOSPITAL | Age: 50
End: 2020-04-25

## 2020-04-25 NOTE — OUTREACH NOTE
COPD/PN Week 1 Survey      Responses   [facility] patient discharged from?  [facility]   COVID-19 Test Status  Negative   Does the patient have one of the following disease processes/diagnoses(primary or secondary)?  COPD/Pneumonia   Is there a successful TCM telephone encounter documented?  No   Was the primary reason for admission:  Pneumonia   Week 1 attempt successful?  No          Hima Soares RN

## 2020-04-26 LAB
BACTERIA SPEC AEROBE CULT: NORMAL
BACTERIA SPEC AEROBE CULT: NORMAL

## 2020-04-28 ENCOUNTER — READMISSION MANAGEMENT (OUTPATIENT)
Dept: CALL CENTER | Facility: HOSPITAL | Age: 50
End: 2020-04-28

## 2020-04-28 ENCOUNTER — TELEPHONE (OUTPATIENT)
Dept: DIABETES SERVICES | Facility: HOSPITAL | Age: 50
End: 2020-04-28

## 2020-04-28 NOTE — OUTREACH NOTE
COPD/PN Week 1 Survey      Responses   [facility] patient discharged from?  [facility]   COVID-19 Test Status  Negative   Does the patient have one of the following disease processes/diagnoses(primary or secondary)?  COPD/Pneumonia   Is there a successful TCM telephone encounter documented?  No   Was the primary reason for admission:  Pneumonia   Week 1 attempt successful?  No          Neha Martínez LPN

## 2020-05-01 ENCOUNTER — READMISSION MANAGEMENT (OUTPATIENT)
Dept: CALL CENTER | Facility: HOSPITAL | Age: 50
End: 2020-05-01

## 2020-05-01 NOTE — OUTREACH NOTE
COPD/PN Week 2 Survey      Responses   [facility] patient discharged from?  [facility]   COVID-19 Test Status  Negative   Does the patient have one of the following disease processes/diagnoses(primary or secondary)?  COPD/Pneumonia   Was the primary reason for admission:  Pneumonia   Week 2 attempt successful?  No   Unsuccessful attempts  Attempt 1          Celena Lau, RN

## 2020-05-04 ENCOUNTER — READMISSION MANAGEMENT (OUTPATIENT)
Dept: CALL CENTER | Facility: HOSPITAL | Age: 50
End: 2020-05-04

## 2020-05-04 NOTE — OUTREACH NOTE
COPD/PN Week 2 Survey      Responses   [facility] patient discharged from?  [facility]   COVID-19 Test Status  Negative   Does the patient have one of the following disease processes/diagnoses(primary or secondary)?  COPD/Pneumonia   Was the primary reason for admission:  Pneumonia   Week 2 attempt successful?  No   Revoke  Decline to participate [UTR x 4 consecutive attempts.]          Celena Lau RN

## 2021-06-03 ENCOUNTER — TRANSCRIBE ORDERS (OUTPATIENT)
Dept: PSYCHIATRY | Facility: CLINIC | Age: 51
End: 2021-06-03

## 2021-06-03 DIAGNOSIS — F90.2 ATTENTION DEFICIT HYPERACTIVITY DISORDER, COMBINED TYPE: Primary | ICD-10-CM

## 2021-07-19 NOTE — OUTREACH NOTE
Above RN's/Staff's notes reviewed.  Reviewed medications and allergies.    SUBJECTIVE:    Remedios Jacobsen is a 51 year old female who presents with PCP- Dr. Hopkins.   Pt presents to  today with c/o heart pounding/palpitations past 1-2 hours and improving. She denies chest pain/sob. States history of anxiety and thinks may be due to anxiety. Pt states she started Effexor today.   Meds verified.  Denies known Latex allergy or symptoms of Latex sensitivity      OBJECTIVE:    Vitals:    07/19/21 1357   BP: (!) 140/80   Pulse:    Resp:    Temp:        General appearance:  , alert, in no distress, cooperative  Skin:  Skin color, texture, turgor are normal. There are no bruises, rashes or lesions.  Eyes: Conjunctivae and sclerae normal and pupils equal, round, reactive to light    Nose:  Normal  Ears:  External ears normal. Canals clear. Tympanic membranes are clear with all landmarks noted.  Throat:  Normal without tonsillar hypertrophy, no erythema, exudates or mucosal lesions.  Neck:  Supple, no lymphadenopathy.  Lungs:  Essentially clear to auscultation and percussion.  Cardiac:  Regular rate and rhythm, no rubs, click gallops or murmurs  Abdomen:  Soft, no tenderness, bowel sounds normoactive  Extremities:  No pretibial edema  Psychiatry:  Patient alert oriented no thoughts of hurting herself or others.  She.  Very anxious tapping her feet and her hands while I interviewed her.  This started shortly after she took the 1st dose of Effexor      ASSESSMENT:  1. Racing heart beat    2. Anxiety    3. Vitamin D deficiency          PLAN:  Orders Placed This Encounter   • Electrocardiogram 12-Lead       · Normal sinus rhythm no evidence of STEMI or significant arrhythmia  Stop Effexor immediately.  Do not take anymore.    Hydroxyzine 25 mg taking 1 or 2 capsules 3 times a day as needed for acute anxiety or inability to sleep.    Recommend counseling and psych management for anxiety with depressive overtones.    Regarding  Medical Week 1 Survey      Responses   Facility patient discharged from?  [facility]   Does the patient have one of the following disease processes/diagnoses(primary or secondary)?  Other   Is there a successful TCM telephone encounter documented?  No   Week 1 attempt successful?  No   Revoke  Decline to participate [No answer/left voicemail]          Neha Martínez LPN   vitamin-D deficiency, take vitamin-D 5000 international units daily.  Check vitamin-D level in 3 months through primary care    Make sure not to skip any meals especially breakfast.  Having a blood sugar goal below 70 is a quick stimulus for anxiety and dizziness and lightheadedness    Check out the book written by Ulises Huffman.  It is called How to stop worrying and start living    Call if conditions worsen

## 2021-11-29 ENCOUNTER — OFFICE VISIT (OUTPATIENT)
Dept: PSYCHIATRY | Facility: CLINIC | Age: 51
End: 2021-11-29

## 2021-11-29 DIAGNOSIS — F33.1 MDD (MAJOR DEPRESSIVE DISORDER), RECURRENT EPISODE, MODERATE (HCC): Primary | Chronic | ICD-10-CM

## 2021-11-29 DIAGNOSIS — F41.1 GAD (GENERALIZED ANXIETY DISORDER): Chronic | ICD-10-CM

## 2021-11-29 PROCEDURE — 90792 PSYCH DIAG EVAL W/MED SRVCS: CPT | Performed by: PHYSICIAN ASSISTANT

## 2021-11-29 RX ORDER — LAMOTRIGINE 25 MG/1
25 TABLET ORAL 2 TIMES DAILY
Qty: 180 TABLET | Refills: 0 | Status: SHIPPED | OUTPATIENT
Start: 2021-11-29 | End: 2022-04-11

## 2021-11-29 NOTE — PROGRESS NOTES
Subjective   Montez Guy is a 51 y.o. male who presents today for initial evaluation in the office x 45 minutes    Chief Complaint:  Depression,    History of Present Illness:   Referred here by Milad Ford NP (with Dr. 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 Beauchamp started him on Provigil for narcolepsy  He went once to therapy years ago when friends told him he drank too much (was drinking a few bottles of liquor weekly before 2014  He quit smoking and drinking years ago    The following portions of the patient's history were reviewed and updated as appropriate: allergies, current medications, past family history, past medical history, past social history, past surgical history and problem list.    PAST PSYCHIATRIC HISTORY  Axis I  Affective/Bipoloar Disorder, Anxiety/Panic Disorder  Axis II  None    PAST OUTPATIENT TREATMENT  Diagnosis treated:  Affective Disorder, Anxiety/Panic Disorder  Treatment Type:  Medication Management  Prior Psychiatric Medications:  Zoloft  Lexapro, weight gain  Wellbutrin, nausea  Support Groups:  None   Sequelae Of Mental Disorder:  job disruption, family disruption, emotional distress        Interval History  No Change    Side Effects  None      Past Medical History:  Past Medical History:   Diagnosis Date   • Cellulitis of left lower extremity 01/2020   • Diabetes (HCC)    • Hypertension    •  MRSA (methicillin resistant staph aureus) culture positive    • Obesity (BMI 30-39.9) 2020   • Obstructive sleep apnea syndrome 2020   • Pneumonia 2014    nectrotizing pneumonia       Social History:  Social History     Socioeconomic History   • Marital status:    Tobacco Use   • Smoking status: Former Smoker     Types: Cigarettes     Quit date:      Years since quittin.9   • Smokeless tobacco: Never Used   Substance and Sexual Activity   • Alcohol use: Never   • Drug use: Never   • Sexual activity: Defer       Family History:  Family History   Problem Relation Age of Onset   • Cancer Mother    • Cancer Maternal Grandmother        Past Surgical History:  Past Surgical History:   Procedure Laterality Date   • BELOW KNEE LEG AMPUTATION Right    • TOE AMPUTATION         Problem List:  Patient Active Problem List   Diagnosis   • Cellulitis   • Pressure ulcer, ankle   • Benign essential hypertension   • Constipation, chronic   • Hypercholesterolemia   • Mood disorder (HCC)   • Obstructive sleep apnea syndrome   • Peyronie's disease   • Type 2 diabetes mellitus with hyperglycemia, without long-term current use of insulin ([facility])   • Vitamin D deficiency   • Open wound of fifth toe of left foot   • Obesity (BMI 30-39.9)   • Fever   • Streptococcal infection group A   • Pneumonia involving right lung   • Sepsis ([facility])   • Below-knee amputation of right lower extremity ([facility]ly [facility])   • MDD (major depressive disorder), recurrent episode, moderate ([facility])   • WEN (generalized anxiety disorder)       Allergy:   No Known Allergies     Discontinued Medications:  There are no discontinued medications.    Current Medications:   Current Outpatient Medications   Medication Sig Dispense Refill   • aspirin-acetaminophen-caffeine (EXCEDRIN MIGRAINE) 250-250-65 MG per tablet Take 2 tablets by mouth Every 6 (Six) Hours As Needed for Headache, Mild Pain  or Moderate Pain .     • budesonide-formoterol (SYMBICORT) 160-4.5  MCG/ACT inhaler Inhale 1 puff 2 (Two) Times a Day As Needed (pt uses PRN).     • doxycycline (ADOXA) 100 MG tablet Take 1 tablet by mouth 2 (Two) Times a Day With Meals. Indications: Pneumonia 11 tablet 0   • glucose blood (OneTouch Verio) test strip 1 each by Other route 4 (Four) Times a Day Before Meals & at Bedtime. Use as instructed 200 each 0   • Insulin Glargine (LANTUS SOLOSTAR) 100 UNIT/ML injection pen Inject 10 Units under the skin into the appropriate area as directed Every Night. 15 mL 0   • Insulin Pen Needle (PEN NEEDLES) 32G X 4 MM misc 1 each Daily. 100 each 2   • Isopropyl Alcohol (ALCOHOL WIPES) 70 % misc Apply 1 each topically 4 (Four) Times a Day Before Meals & at Bedtime. 200 each 0   • lamoTRIgine (LaMICtal) 25 MG tablet Take 1 tablet by mouth 2 (Two) Times a Day. For mood stabilizer 180 tablet 0   • Lancets (ONETOUCH DELICA PLUS [id number]) misc 1 each 4 (Four) Times a Day Before Meals & at Bedtime. 200 each 0   • lisinopril-hydrochlorothiazide (PRINZIDE,ZESTORETIC) 10-12.5 MG per tablet Take 1 tablet by mouth Daily.     • metFORMIN (GLUCOPHAGE) 500 MG tablet Take 0.5 tabs po bid x 2 weeks, then start taking 1 tab po bid. 60 tablet 0   • modafinil (PROVIGIL) 100 MG tablet Take 100 mg by mouth Daily. RX is for BID, pt just takes QD       No current facility-administered medications for this visit.         Psychological ROS: positive for - anxiety, concentration difficulties, depression, irritability, mood swings and sleep disturbances  negative for - behavioral disorder, decreased libido, disorientation, hallucinations, hostility, memory difficulties, obsessive thoughts, physical abuse, sexual abuse or suicidal ideation      Physical Exam:   There were no vitals taken for this visit.    Mental Status Exam:   Hygiene:   good  Cooperation:  Cooperative  Eye Contact:  Good  Psychomotor Behavior:  Aggitated  Affect:  Angry  Mood: irritable  Hopelessness: Denies  Speech:  Normal  Thought Process:   Goal directed  Thought Content:  Normal  Suicidal:  None  Homicidal:  None  Hallucinations:  None  Delusion:  None  Memory:  Intact  Orientation:  Person, Place, Time and Situation  Reliability:  good  Insight:  Good  Judgement:  Good  Impulse Control:  Good  Physical/Medical Issues:  Yes       PHQ-9 Depression Screening  Little interest or pleasure in doing things? 2   Feeling down, depressed, or hopeless? 2   Trouble falling or staying asleep, or sleeping too much? 1   Feeling tired or having little energy? 2   Poor appetite or overeating? 1   Feeling bad about yourself - or that you are a failure or have let yourself or your family down? 3   Trouble concentrating on things, such as reading the newspaper or watching television? 3   Moving or speaking so slowly that other people could have noticed? Or the opposite - being so fidgety or restless that you have been moving around a lot more than usual? 0   Thoughts that you would be better off dead, or of hurting yourself in some way? 2   PHQ-9 Total Score 16   If you checked off any problems, how difficult have these problems made it for you to do your work, take care of things at home, or get along with other people? Extremely dIfficult           Former smoker    I advised Montez of the risks of tobacco use.     Lab Results:   No visits with results within 3 Month(s) from this visit.   Latest known visit with results is:   Admission on 04/21/2020, Discharged on 04/22/2020   Component Date Value Ref Range Status   • Glucose 04/21/2020 322* 65 - 99 mg/dL Final   • BUN 04/21/2020 20  6 - 20 mg/dL Final   • Creatinine 04/21/2020 1.27  0.76 - 1.27 mg/dL Final   • Sodium 04/21/2020 131* 136 - 145 mmol/L Final   • Potassium 04/21/2020 4.9  3.5 - 5.2 mmol/L Final   • Chloride 04/21/2020 97* 98 - 107 mmol/L Final   • CO2 04/21/2020 21.0* 22.0 - 29.0 mmol/L Final   • Calcium 04/21/2020 9.5  8.6 - 10.5 mg/dL Final   • Total Protein 04/21/2020 7.3  6.0 - 8.5 g/dL Final   •  Albumin 04/21/2020 3.80  3.50 - 5.20 g/dL Final   • ALT (SGPT) 04/21/2020 31  1 - 41 U/L Final   • AST (SGOT) 04/21/2020 36  1 - 40 U/L Final    Specimen hemolyzed.  Results may be affected.   • Alkaline Phosphatase 04/21/2020 83  39 - 117 U/L Final   • Total Bilirubin 04/21/2020 1.3* 0.2 - 1.2 mg/dL Final   • eGFR Non African Amer 04/21/2020 61  >60 mL/min/1.73 Final   • Globulin 04/21/2020 3.5  gm/dL Final   • A/G Ratio 04/21/2020 1.1  g/dL Final   • BUN/Creatinine Ratio 04/21/2020 15.7  7.0 - 25.0 Final   • Anion Gap 04/21/2020 13.0  5.0 - 15.0 mmol/L Final   • LDH 04/21/2020 325* 135 - 225 U/L Final    Specimen hemolyzed.  Results may be affected.   • Procalcitonin 04/21/2020 0.55* 0.10 - 0.25 ng/mL Final   • D-Dimer, Quantitative 04/21/2020 1.21* 0.17 - 0.59 MCGFEU/mL Final   • ADENOVIRUS, PCR 04/21/2020 Not Detected  Not Detected Final   • Coronavirus 229E 04/21/2020 Not Detected  Not Detected Final   • Coronavirus HKU1 04/21/2020 Not Detected  Not Detected Final   • Coronavirus NL63 04/21/2020 Not Detected  Not Detected Final   • Coronavirus OC43 04/21/2020 Not Detected  Not Detected Final   • Human Metapneumovirus 04/21/2020 Not Detected  Not Detected Final   • Human Rhinovirus/Enterovirus 04/21/2020 Not Detected  Not Detected Final   • Influenza B PCR 04/21/2020 Not Detected  Not Detected Final   • Parainfluenza Virus 1 04/21/2020 Not Detected  Not Detected Final   • Parainfluenza Virus 2 04/21/2020 Not Detected  Not Detected Final   • Parainfluenza Virus 3 04/21/2020 Not Detected  Not Detected Final   • Parainfluenza Virus 4 04/21/2020 Not Detected  Not Detected Final   • Bordetella pertussis pcr 04/21/2020 Not Detected  Not Detected Final   • Influenza A H1 2009 PCR 04/21/2020 Not Detected  Not Detected Final   • Chlamydophila pneumoniae PCR 04/21/2020 Not Detected  Not Detected Final   • Mycoplasma pneumo by PCR 04/21/2020 Not Detected  Not Detected Final   • Influenza A PCR 04/21/2020 Not Detected  Not  Detected Final   • Influenza A H3 04/21/2020 Not Detected  Not Detected Final   • Influenza A H1 04/21/2020 Not Detected  Not Detected Final   • RSV, PCR 04/21/2020 Not Detected  Not Detected Final   • C-Reactive Protein 04/21/2020 2.69* 0.00 - 0.50 mg/dL Final   • Ferritin 04/21/2020 258.00  30.00 - 400.00 ng/mL Final   • COVID19 04/21/2020 Not Detected  Not Detected Final   • Troponin T 04/21/2020 <0.010  0.000 - 0.030 ng/mL Final   • proBNP 04/21/2020 95.0  5.0 - 450.0 pg/mL Final   • Blood Culture 04/21/2020 No growth at 5 days   Final   • Blood Culture 04/21/2020 No growth at 5 days   Final   • Strep A Ag 04/21/2020 Positive* Negative Final   • WBC 04/21/2020 11.00* 3.40 - 10.80 10*3/mm3 Final   • RBC 04/21/2020 5.44  4.14 - 5.80 10*6/mm3 Final   • Hemoglobin 04/21/2020 14.7  13.0 - 17.7 g/dL Final   • Hematocrit 04/21/2020 43.1  37.5 - 51.0 % Final   • MCV 04/21/2020 79.3  79.0 - 97.0 fL Final   • MCH 04/21/2020 27.0  26.6 - 33.0 pg Final   • MCHC 04/21/2020 34.0  31.5 - 35.7 g/dL Final   • RDW 04/21/2020 15.6* 12.3 - 15.4 % Final   • RDW-SD 04/21/2020 44.2  37.0 - 54.0 fl Final   • MPV 04/21/2020 8.7  6.0 - 12.0 fL Final   • Platelets 04/21/2020 134* 140 - 450 10*3/mm3 Final   • Neutrophil % 04/21/2020 83.8* 42.7 - 76.0 % Final   • Lymphocyte % 04/21/2020 6.9* 19.6 - 45.3 % Final   • Monocyte % 04/21/2020 7.9  5.0 - 12.0 % Final   • Eosinophil % 04/21/2020 0.3  0.3 - 6.2 % Final   • Basophil % 04/21/2020 1.1  0.0 - 1.5 % Final   • Neutrophils, Absolute 04/21/2020 9.20* 1.70 - 7.00 10*3/mm3 Final   • Lymphocytes, Absolute 04/21/2020 0.80  0.70 - 3.10 10*3/mm3 Final   • Monocytes, Absolute 04/21/2020 0.90  0.10 - 0.90 10*3/mm3 Final   • Eosinophils, Absolute 04/21/2020 0.00  0.00 - 0.40 10*3/mm3 Final   • Basophils, Absolute 04/21/2020 0.10  0.00 - 0.20 10*3/mm3 Final   • nRBC 04/21/2020 0.5* 0.0 - 0.2 /100 WBC Final   • Lipase 04/21/2020 67* 13 - 60 U/L Final   • Color, UA 04/21/2020 Yellow  Yellow, Straw  Final   • Appearance, UA 04/21/2020 Clear  Clear Final   • pH, UA 04/21/2020 6.0  5.0 - 8.0 Final   • Specific Gravity, UA 04/21/2020 1.019  1.005 - 1.030 Final   • Glucose, UA 04/21/2020 500 mg/dL (2+)* Negative Final   • Ketones, UA 04/21/2020 Negative  Negative Final   • Bilirubin, UA 04/21/2020 Negative  Negative Final   • Blood, UA 04/21/2020 Large (3+)* Negative Final   • Protein, UA 04/21/2020 >=300 mg/dL (3+)* Negative Final   • Leuk Esterase, UA 04/21/2020 Negative  Negative Final   • Nitrite, UA 04/21/2020 Negative  Negative Final   • Urobilinogen, UA 04/21/2020 0.2 E.U./dL  0.2 - 1.0 E.U./dL Final   • Lactate 04/21/2020 1.6  0.5 - 2.0 mmol/L Final    Serial Number: [id number]:  [id number]   • RBC, UA 04/21/2020 13-20* None Seen /HPF Final   • WBC, UA 04/21/2020 0-2* None Seen /HPF Final   • Bacteria, UA 04/21/2020 None Seen  None Seen /HPF Final   • Squamous Epithelial Cells, UA 04/21/2020 0-2  None Seen, 0-2 /HPF Final   • Hyaline Casts, UA 04/21/2020 3-6  None Seen /LPF Final   • Methodology 04/21/2020 Automated Microscopy   Final   • Site 04/21/2020 Right Radial   Final   • René's Test 04/21/2020 Positive   Final   • pH, Arterial 04/21/2020 7.410  7.350 - 7.450 pH units Final   • pCO2, Arterial 04/21/2020 36.0  35.0 - 48.0 mm Hg Final   • pO2, Arterial 04/21/2020 65.1* 83.0 - 108.0 mm Hg Final   • HCO3, Arterial 04/21/2020 22.8  21.0 - 28.0 mmol/L Final   • Base Excess, Arterial 04/21/2020 -1.3* 0.0 - 3.0 mmol/L Final    Serial Number: [id number]:  [id number]   • O2 Saturation, Arterial 04/21/2020 92.8* 94.0 - 98.0 % Final   • CO2 Content 04/21/2020 23.9  22 - 29 mmol/L Final   • Barometric Pressure for Blood Gas 04/21/2020    Final    N/A   • Modality 04/21/2020 Room Air   Final   • FIO2 04/21/2020 21  % Final   • Hemodilution 04/21/2020 No   Final   • RBC Morphology 04/21/2020 Normal  Normal Final   • WBC Morphology 04/21/2020 Normal  Normal Final   • Platelet Estimate 04/21/2020 Adequate   Normal Final   • Hemoglobin A1C 04/21/2020 9.4* 3.5 - 5.6 % Final   • LEGIONELLA ANTIGEN, URINE 04/21/2020 Negative  Negative Final   • Strep Pneumo Ag 04/21/2020 Negative  Negative Final   • Glucose 04/21/2020 188* 70 - 105 mg/dL Final    Serial Number: [id number]:  [id number]   • WBC 04/22/2020 7.20  3.40 - 10.80 10*3/mm3 Final   • RBC 04/22/2020 5.11  4.14 - 5.80 10*6/mm3 Final   • Hemoglobin 04/22/2020 14.6  13.0 - 17.7 g/dL Final   • Hematocrit 04/22/2020 41.5  37.5 - 51.0 % Final   • MCV 04/22/2020 81.2  79.0 - 97.0 fL Final   • MCH 04/22/2020 28.6  26.6 - 33.0 pg Final   • MCHC 04/22/2020 35.2  31.5 - 35.7 g/dL Final   • RDW 04/22/2020 16.1* 12.3 - 15.4 % Final   • RDW-SD 04/22/2020 45.5  37.0 - 54.0 fl Final   • MPV 04/22/2020 8.6  6.0 - 12.0 fL Final   • Platelets 04/22/2020 121* 140 - 450 10*3/mm3 Final   • Neutrophil % 04/22/2020 65.6  42.7 - 76.0 % Final   • Lymphocyte % 04/22/2020 16.2* 19.6 - 45.3 % Final   • Monocyte % 04/22/2020 14.6* 5.0 - 12.0 % Final   • Eosinophil % 04/22/2020 2.4  0.3 - 6.2 % Final   • Basophil % 04/22/2020 1.2  0.0 - 1.5 % Final   • Neutrophils, Absolute 04/22/2020 4.70  1.70 - 7.00 10*3/mm3 Final   • Lymphocytes, Absolute 04/22/2020 1.20  0.70 - 3.10 10*3/mm3 Final   • Monocytes, Absolute 04/22/2020 1.10* 0.10 - 0.90 10*3/mm3 Final   • Eosinophils, Absolute 04/22/2020 0.20  0.00 - 0.40 10*3/mm3 Final   • Basophils, Absolute 04/22/2020 0.10  0.00 - 0.20 10*3/mm3 Final   • nRBC 04/22/2020 0.5* 0.0 - 0.2 /100 WBC Final   • Magnesium 04/22/2020 1.8  1.6 - 2.6 mg/dL Final   • Glucose 04/22/2020 240* 65 - 99 mg/dL Final   • BUN 04/22/2020 14  6 - 20 mg/dL Final   • Creatinine 04/22/2020 1.09  0.76 - 1.27 mg/dL Final   • Sodium 04/22/2020 135* 136 - 145 mmol/L Final   • Potassium 04/22/2020 4.8  3.5 - 5.2 mmol/L Final   • Chloride 04/22/2020 97* 98 - 107 mmol/L Final   • CO2 04/22/2020 27.0  22.0 - 29.0 mmol/L Final   • Calcium 04/22/2020 9.0  8.6 - 10.5 mg/dL Final   • Total  Protein 04/22/2020 7.0  6.0 - 8.5 g/dL Final   • Albumin 04/22/2020 4.00  3.50 - 5.20 g/dL Final   • ALT (SGPT) 04/22/2020 27  1 - 41 U/L Final   • AST (SGOT) 04/22/2020 27  1 - 40 U/L Final    Specimen hemolyzed.  Results may be affected.   • Alkaline Phosphatase 04/22/2020 77  39 - 117 U/L Final   • Total Bilirubin 04/22/2020 1.6* 0.2 - 1.2 mg/dL Final   • eGFR Non African Amer 04/22/2020 72  >60 mL/min/1.73 Final   • Globulin 04/22/2020 3.0  gm/dL Final   • A/G Ratio 04/22/2020 1.3  g/dL Final   • BUN/Creatinine Ratio 04/22/2020 12.8  7.0 - 25.0 Final   • Anion Gap 04/22/2020 11.0  5.0 - 15.0 mmol/L Final   • Lipase 04/22/2020 85* 13 - 60 U/L Final   • Procalcitonin 04/22/2020 0.59* 0.10 - 0.25 ng/mL Final   • Glucose 04/22/2020 244* 70 - 105 mg/dL Final    Serial Number: [id number]:  [id number]   • Glucose 04/22/2020 270* 70 - 105 mg/dL Final    Serial Number: [id number]:  [id number]       Assessment/Plan   Problems Addressed this Visit        Mental Health    MDD (major depressive disorder), recurrent episode, moderate (HCC) - Primary (Chronic)    WEN (generalized anxiety disorder) (Chronic)      Diagnoses       Codes Comments    MDD (major depressive disorder), recurrent episode, moderate (HCC)    -  Primary ICD-10-CM: F33.1  ICD-9-CM: 296.32     WEN (generalized anxiety disorder)     ICD-10-CM: F41.1  ICD-9-CM: 300.02           Visit Diagnoses:    ICD-10-CM ICD-9-CM   1. MDD (major depressive disorder), recurrent episode, moderate (HCC)  F33.1 296.32   2. WEN (generalized anxiety disorder)  F41.1 300.02       TREATMENT PLAN/GOALS: Continue supportive psychotherapy efforts and medications as indicated. Treatment and medication options discussed during today's visit. Patient ackowledged and verbally consented to continue with current treatment plan and was educated on the importance of compliance with treatment and follow-up appointments.    MEDICATION ISSUES:  INSPECT reviewed as  expected  Discussed medication options and treatment plan of prescribed medication as well as the risks, benefits, and side effects including potential falls, possible impaired driving and metabolic adversities among others. Patient is agreeable to call the office with any worsening of symptoms or onset of side effects. Patient is agreeable to call 911 or go to the nearest ER should he/she begin having SI/HI. No medication side effects or related complaints today.     Patient with chronic depression, anxiety who is irritable, gets angry easily.  He is not very tolerable of antidepressants, does not like it if it slows his mind down at all.  He is willing to try a Trintellix 5 mg daily #28 samples given.  He will start the Trintellix in 2 weeks after starting the Lamictal 25 mg twice daily for mood stabilizer.  He will call in a few weeks with an update and can increase the Lamictal to 50 mg twice daily.  Discussed doing GeneSight testing, but the patient wishes to think about it.    MEDS ORDERED DURING VISIT:  New Medications Ordered This Visit   Medications   • lamoTRIgine (LaMICtal) 25 MG tablet     Sig: Take 1 tablet by mouth 2 (Two) Times a Day. For mood stabilizer     Dispense:  180 tablet     Refill:  0       Return in about 2 months (around 1/29/2022).         This document has been electronically signed by Amie Garcia PA-C  November 30, 2021 07:56 EST    Part of this note may be an electronic transcription/translation of spoken language to printed text using the Dragon Dictation System.

## 2021-11-30 ENCOUNTER — PATIENT ROUNDING (BHMG ONLY) (OUTPATIENT)
Dept: PSYCHIATRY | Facility: CLINIC | Age: 51
End: 2021-11-30

## 2021-11-30 PROBLEM — F41.1 GAD (GENERALIZED ANXIETY DISORDER): Chronic | Status: ACTIVE | Noted: 2021-11-30

## 2021-11-30 PROBLEM — F33.1 MDD (MAJOR DEPRESSIVE DISORDER), RECURRENT EPISODE, MODERATE (HCC): Chronic | Status: ACTIVE | Noted: 2021-11-30

## 2021-11-30 RX ORDER — VORTIOXETINE 5 MG/1
5 TABLET, FILM COATED ORAL
Qty: 28 TABLET | Refills: 0
Start: 2021-11-30

## 2021-11-30 NOTE — PROGRESS NOTES
November 30, 2021    Hello, may I speak with Montez Guy?    My name is Becky Yost      I am  with [facility]  [facility]  [facility] hospital[address] [place] [address].    Before we get started may I verify your date of birth? 1970    I am calling to officially welcome you to our practice and ask about your recent visit. Is this a good time to talk? yes    Tell me about your visit with us. What things went well?  The patient reported the visit was fine. He had no complaints.       We're always looking for ways to make our patients' experiences even better. Do you have recommendations on ways we may improve?  Yes,  The patient stated the visit with a physician is always tense and he suggested we invest in comfortable chairs instead of hard plastic. I  explained due to Covid we decided the plastic chairs were in the best interest of our patients, because their easy to wipe down and keep clean. He totally understood the plastic chairs and agreed.    Overall were you satisfied with your first visit to our practice? yes       I appreciate you taking the time to speak with me today. Is there anything else I can do for you? no      Thank you, and have a great day.

## 2021-12-28 DIAGNOSIS — M86.9 OSTEOMYELITIS OF GREAT TOE OF LEFT FOOT (HCC): Primary | ICD-10-CM

## 2021-12-28 RX ORDER — VANCOMYCIN HYDROCHLORIDE 1 G/200ML
1000 INJECTION, SOLUTION INTRAVENOUS ONCE
Status: CANCELLED | OUTPATIENT
Start: 2021-12-28 | End: 2021-12-28

## 2022-01-03 ENCOUNTER — HOSPITAL ENCOUNTER (OUTPATIENT)
Dept: GENERAL RADIOLOGY | Facility: HOSPITAL | Age: 52
Discharge: HOME | End: 2022-01-03

## 2022-01-03 ENCOUNTER — PRE-ADMISSION TESTING (OUTPATIENT)
Dept: PREADMISSION TESTING | Facility: HOSPITAL | Age: 52
End: 2022-01-03

## 2022-01-03 VITALS
OXYGEN SATURATION: 98 % | BODY MASS INDEX: 34.95 KG/M2 | WEIGHT: 258 LBS | DIASTOLIC BLOOD PRESSURE: 90 MMHG | SYSTOLIC BLOOD PRESSURE: 141 MMHG | TEMPERATURE: 98.7 F | HEART RATE: 96 BPM | HEIGHT: 72 IN | RESPIRATION RATE: 16 BRPM

## 2022-01-03 DIAGNOSIS — S91.105D OPEN WOUND OF FIFTH TOE OF LEFT FOOT, SUBSEQUENT ENCOUNTER: ICD-10-CM

## 2022-01-03 LAB
ABO GROUP BLD: NORMAL
ALBUMIN SERPL-MCNC: 4.2 G/DL (ref 3.5–5.2)
ALBUMIN/GLOB SERPL: 1.3 G/DL
ALP SERPL-CCNC: 93 U/L (ref 39–117)
ALT SERPL W P-5'-P-CCNC: 22 U/L (ref 1–41)
ANION GAP SERPL CALCULATED.3IONS-SCNC: 12.5 MMOL/L (ref 5–15)
AST SERPL-CCNC: 18 U/L (ref 1–40)
BASOPHILS # BLD AUTO: 0.11 10*3/MM3 (ref 0–0.2)
BASOPHILS NFR BLD AUTO: 0.9 % (ref 0–1.5)
BILIRUB SERPL-MCNC: 0.6 MG/DL (ref 0–1.2)
BLD GP AB SCN SERPL QL: NEGATIVE
BUN SERPL-MCNC: 24 MG/DL (ref 6–20)
BUN/CREAT SERPL: 17.9 (ref 7–25)
CALCIUM SPEC-SCNC: 9.6 MG/DL (ref 8.6–10.5)
CHLORIDE SERPL-SCNC: 99 MMOL/L (ref 98–107)
CO2 SERPL-SCNC: 23.5 MMOL/L (ref 22–29)
CREAT SERPL-MCNC: 1.34 MG/DL (ref 0.76–1.27)
DEPRECATED RDW RBC AUTO: 39.9 FL (ref 37–54)
EOSINOPHIL # BLD AUTO: 0.28 10*3/MM3 (ref 0–0.4)
EOSINOPHIL NFR BLD AUTO: 2.3 % (ref 0.3–6.2)
ERYTHROCYTE [DISTWIDTH] IN BLOOD BY AUTOMATED COUNT: 13.7 % (ref 12.3–15.4)
GFR SERPL CREATININE-BSD FRML MDRD: 56 ML/MIN/1.73
GLOBULIN UR ELPH-MCNC: 3.3 GM/DL
GLUCOSE SERPL-MCNC: 128 MG/DL (ref 65–99)
HCT VFR BLD AUTO: 43 % (ref 37.5–51)
HGB BLD-MCNC: 14.4 G/DL (ref 13–17.7)
IMM GRANULOCYTES # BLD AUTO: 0.07 10*3/MM3 (ref 0–0.05)
IMM GRANULOCYTES NFR BLD AUTO: 0.6 % (ref 0–0.5)
LYMPHOCYTES # BLD AUTO: 1.75 10*3/MM3 (ref 0.7–3.1)
LYMPHOCYTES NFR BLD AUTO: 14.1 % (ref 19.6–45.3)
MCH RBC QN AUTO: 27.5 PG (ref 26.6–33)
MCHC RBC AUTO-ENTMCNC: 33.5 G/DL (ref 31.5–35.7)
MCV RBC AUTO: 82.1 FL (ref 79–97)
MONOCYTES # BLD AUTO: 0.93 10*3/MM3 (ref 0.1–0.9)
MONOCYTES NFR BLD AUTO: 7.5 % (ref 5–12)
NEUTROPHILS NFR BLD AUTO: 74.6 % (ref 42.7–76)
NEUTROPHILS NFR BLD AUTO: 9.23 10*3/MM3 (ref 1.7–7)
NRBC BLD AUTO-RTO: 0 /100 WBC (ref 0–0.2)
PLATELET # BLD AUTO: 259 10*3/MM3 (ref 140–450)
PMV BLD AUTO: 10.1 FL (ref 6–12)
POTASSIUM SERPL-SCNC: 4.5 MMOL/L (ref 3.5–5.2)
PROT SERPL-MCNC: 7.5 G/DL (ref 6–8.5)
QT INTERVAL: 392 MS
RBC # BLD AUTO: 5.24 10*6/MM3 (ref 4.14–5.8)
RH BLD: POSITIVE
SARS-COV-2 ORF1AB RESP QL NAA+PROBE: NOT DETECTED
SODIUM SERPL-SCNC: 135 MMOL/L (ref 136–145)
T&S EXPIRATION DATE: NORMAL
WBC NRBC COR # BLD: 12.37 10*3/MM3 (ref 3.4–10.8)

## 2022-01-03 PROCEDURE — 93005 ELECTROCARDIOGRAM TRACING: CPT

## 2022-01-03 PROCEDURE — U0004 COV-19 TEST NON-CDC HGH THRU: HCPCS

## 2022-01-03 PROCEDURE — 93010 ELECTROCARDIOGRAM REPORT: CPT | Performed by: INTERNAL MEDICINE

## 2022-01-03 PROCEDURE — 71046 X-RAY EXAM CHEST 2 VIEWS: CPT

## 2022-01-03 PROCEDURE — 86923 COMPATIBILITY TEST ELECTRIC: CPT

## 2022-01-03 PROCEDURE — C9803 HOPD COVID-19 SPEC COLLECT: HCPCS

## 2022-01-03 PROCEDURE — 36415 COLL VENOUS BLD VENIPUNCTURE: CPT

## 2022-01-03 PROCEDURE — 86901 BLOOD TYPING SEROLOGIC RH(D): CPT

## 2022-01-03 PROCEDURE — 80053 COMPREHEN METABOLIC PANEL: CPT

## 2022-01-03 PROCEDURE — 86850 RBC ANTIBODY SCREEN: CPT

## 2022-01-03 PROCEDURE — 85025 COMPLETE CBC W/AUTO DIFF WBC: CPT

## 2022-01-03 PROCEDURE — 86900 BLOOD TYPING SEROLOGIC ABO: CPT

## 2022-01-03 RX ORDER — SEMAGLUTIDE 1.34 MG/ML
INJECTION, SOLUTION SUBCUTANEOUS
COMMUNITY
Start: 2021-11-26

## 2022-01-03 RX ORDER — ROSUVASTATIN CALCIUM 40 MG/1
40 TABLET, COATED ORAL DAILY
COMMUNITY
Start: 2021-11-26

## 2022-01-03 RX ORDER — LOSARTAN POTASSIUM 50 MG/1
50 TABLET ORAL DAILY
COMMUNITY
Start: 2021-11-03

## 2022-01-03 RX ORDER — ERGOCALCIFEROL (VITAMIN D2) 10 MCG
400 TABLET ORAL DAILY
COMMUNITY

## 2022-01-03 NOTE — DISCHARGE INSTRUCTIONS
Take the following medications the morning of surgery: NONE    ARRIVE AT 6:30 AM      If you are on prescription narcotic pain medication to control your pain you may also take that medication the morning of surgery.    General Instructions:  • Do not eat solid food after midnight the night before surgery.  • You may drink clear liquids day of surgery but must stop at least one hour before your hospital arrival time. CUTOFF TIME IS 5:30 AM.  • It is beneficial for you to have a clear drink that contains carbohydrates the day of surgery.  We suggest a 12 to 20 ounce bottle of Gatorade or Powerade for non-diabetic patients or a 12 to 20 ounce bottle of G2 or Powerade Zero for diabetic patients. (Pediatric patients, are not advised to drink a 12 to 20 ounce carbohydrate drink)    Clear liquids are liquids you can see through.  Nothing red in color.     Plain water                               Sports drinks  Sodas                                   Gelatin (Jell-O)  Fruit juices without pulp such as white grape juice and apple juice  Popsicles that contain no fruit or yogurt  Tea or coffee (no cream or milk added)  Gatorade / Powerade  G2 / Powerade Zero    • Patients who avoid smoking, chewing tobacco and alcohol for 4 weeks prior to surgery have a reduced risk of post-operative complications.  Quit smoking as many days before surgery as you can.  • Do not smoke, use chewing tobacco or drink alcohol the day of surgery.   • If applicable bring your C-PAP/ BI-PAP machine.  • Bring any papers given to you in the doctor’s office.  • Wear clean comfortable clothes.  • Do not wear contact lenses, false eyelashes or make-up.  Bring a case for your glasses.   • Bring crutches or walker if applicable.  • Remove all piercings.  Leave jewelry and any other valuables at home.  • Hair extensions with metal clips must be removed prior to surgery.  • The Pre-Admission Testing nurse will instruct you to bring medications if unable to  obtain an accurate list in Pre-Admission Testing.        If you were given a blood bank ID arm band remember to bring it with you the day of surgery.    Preventing a Surgical Site Infection:  • For 2 to 3 days before surgery, avoid shaving with a razor because the razor can irritate skin and make it easier to develop an infection.    • Any areas of open skin can increase the risk of a post-operative wound infection by allowing bacteria to enter and travel throughout the body.  Notify your surgeon if you have any skin wounds / rashes even if it is not near the expected surgical site.  The area will need assessed to determine if surgery should be delayed until it is healed.  • The night prior to surgery shower using a fresh bar of anti-bacterial soap (such as Dial) and clean washcloth.  Sleep in a clean bed with clean clothing.  Do not allow pets to sleep with you.  • Shower on the morning of surgery using a fresh bar of anti-bacterial soap (such as Dial) and clean washcloth.  Dry with a clean towel and dress in clean clothing.  • Ask your surgeon if you will be receiving antibiotics prior to surgery.  • Make sure you, your family, and all healthcare providers clean their hands with soap and water or an alcohol based hand  before caring for you or your wound.    Day of surgery:  Your arrival time is approximately two hours before your scheduled surgery time.  Upon arrival, a Pre-op nurse and Anesthesiologist will review your health history, obtain vital signs, and answer questions you may have.  The only belongings needed at this time will be a list of your home medications and if applicable your C-PAP/BI-PAP machine.  A Pre-op nurse will start an IV and you may receive medication in preparation for surgery, including something to help you relax.     Please be aware that surgery does come with discomfort.  We want to make every effort to control your discomfort so please discuss any uncontrolled symptoms with  your nurse.   Your doctor will most likely have prescribed pain medications.      If you are going home after surgery you will receive individualized written care instructions before being discharged.  A responsible adult must drive you to and from the hospital on the day of your surgery and stay with you for 24 hours.  Discharge prescriptions can be filled by the hospital pharmacy during regular pharmacy hours.  If you are having surgery late in the day/evening your prescription may be e-prescribed to your pharmacy.  Please verify your pharmacy hours or chose a 24 hour pharmacy to avoid not having access to your prescription because your pharmacy has closed for the day.    If you are staying overnight following surgery, you will be transported to your hospital room following the recovery period.  [facility] has all private rooms.    If you have any questions please call Pre-Admission Testing at [phone].  Deductibles and co-payments are collected on the day of service. Please be prepared to pay the required co-pay, deductible or deposit on the day of service as defined by your plan.    Patient Education for Self-Quarantine Process    • Following your COVID testing, we strongly recommend that you wear a mask when you are with other people and practice social distancing.   • Limit your activities to only required outings.  • Wash your hands with soap and water frequently for at least 20 seconds.   • Avoid touching your eyes, nose and mouth with unwashed hands.  • Do not share anything - utensils, drinking glasses, food from the same bowl.   • Sanitize household surfaces daily. Include all high touch areas (door handles, light switches, phones, countertops, etc.)    Call your surgeon immediately if you experience any of the following symptoms:  • Sore Throat  • Shortness of Breath or difficulty breathing  • Cough  • Chills  • Body soreness or muscle pain  • Headache  • Fever  • New loss of taste or  smell  • Do not arrive for your surgery ill.  Your procedure will need to be rescheduled to another time.  You will need to call your physician before the day of surgery to avoid any unnecessary exposure to hospital staff as well as other patients.

## 2022-01-06 ENCOUNTER — ANESTHESIA EVENT (OUTPATIENT)
Dept: PERIOP | Facility: HOSPITAL | Age: 52
End: 2022-01-06

## 2022-01-06 ENCOUNTER — ANESTHESIA (OUTPATIENT)
Dept: PERIOP | Facility: HOSPITAL | Age: 52
End: 2022-01-06

## 2022-01-06 ENCOUNTER — HOSPITAL ENCOUNTER (INPATIENT)
Facility: HOSPITAL | Age: 52
LOS: 5 days | Discharge: NURSING, REHAB OR LONG-TERM CARE | End: 2022-01-11
Attending: ORTHOPAEDIC SURGERY | Admitting: ORTHOPAEDIC SURGERY

## 2022-01-06 DIAGNOSIS — M86.9 OSTEOMYELITIS OF GREAT TOE OF LEFT FOOT: Primary | ICD-10-CM

## 2022-01-06 DIAGNOSIS — S91.105D OPEN WOUND OF FIFTH TOE OF LEFT FOOT, SUBSEQUENT ENCOUNTER: ICD-10-CM

## 2022-01-06 DIAGNOSIS — Z89.512 S/P BKA (BELOW KNEE AMPUTATION), LEFT: ICD-10-CM

## 2022-01-06 DIAGNOSIS — S91.105A OPEN WOUND OF FIFTH TOE OF LEFT FOOT, INITIAL ENCOUNTER: ICD-10-CM

## 2022-01-06 PROBLEM — E11.42 TYPE 2 DIABETES MELLITUS WITH DIABETIC POLYNEUROPATHY, WITHOUT LONG-TERM CURRENT USE OF INSULIN (HCC): Status: ACTIVE | Noted: 2020-01-16

## 2022-01-06 LAB
ABO GROUP BLD: NORMAL
GLUCOSE BLDC GLUCOMTR-MCNC: 135 MG/DL (ref 70–130)
GLUCOSE BLDC GLUCOMTR-MCNC: 147 MG/DL (ref 70–130)
GLUCOSE BLDC GLUCOMTR-MCNC: 171 MG/DL (ref 70–130)
GLUCOSE BLDC GLUCOMTR-MCNC: 270 MG/DL (ref 70–130)
RH BLD: POSITIVE

## 2022-01-06 PROCEDURE — 25010000002 FENTANYL CITRATE (PF) 50 MCG/ML SOLUTION: Performed by: ANESTHESIOLOGY

## 2022-01-06 PROCEDURE — 0 CEFAZOLIN IN DEXTROSE 2-4 GM/100ML-% SOLUTION: Performed by: ORTHOPAEDIC SURGERY

## 2022-01-06 PROCEDURE — G0378 HOSPITAL OBSERVATION PER HR: HCPCS

## 2022-01-06 PROCEDURE — 25010000002 ROPIVACAINE PER 1 MG: Performed by: ANESTHESIOLOGY

## 2022-01-06 PROCEDURE — 76942 ECHO GUIDE FOR BIOPSY: CPT | Performed by: ORTHOPAEDIC SURGERY

## 2022-01-06 PROCEDURE — 25010000002 FENTANYL CITRATE (PF) 50 MCG/ML SOLUTION: Performed by: NURSE ANESTHETIST, CERTIFIED REGISTERED

## 2022-01-06 PROCEDURE — 25010000002 DEXAMETHASONE PER 1 MG: Performed by: NURSE ANESTHETIST, CERTIFIED REGISTERED

## 2022-01-06 PROCEDURE — 82962 GLUCOSE BLOOD TEST: CPT

## 2022-01-06 PROCEDURE — 88307 TISSUE EXAM BY PATHOLOGIST: CPT | Performed by: ORTHOPAEDIC SURGERY

## 2022-01-06 PROCEDURE — 25010000002 MIDAZOLAM PER 1 MG: Performed by: ANESTHESIOLOGY

## 2022-01-06 PROCEDURE — 86901 BLOOD TYPING SEROLOGIC RH(D): CPT

## 2022-01-06 PROCEDURE — 0Y6J0Z1 DETACHMENT AT LEFT LOWER LEG, HIGH, OPEN APPROACH: ICD-10-PCS | Performed by: ORTHOPAEDIC SURGERY

## 2022-01-06 PROCEDURE — 25010000002 VANCOMYCIN PER 500 MG: Performed by: ORTHOPAEDIC SURGERY

## 2022-01-06 PROCEDURE — 0 LIDOCAINE 1 % SOLUTION 20 ML VIAL: Performed by: ORTHOPAEDIC SURGERY

## 2022-01-06 PROCEDURE — 25010000002 NEOSTIGMINE 5 MG/10ML SOLUTION: Performed by: NURSE ANESTHETIST, CERTIFIED REGISTERED

## 2022-01-06 PROCEDURE — 86900 BLOOD TYPING SEROLOGIC ABO: CPT

## 2022-01-06 PROCEDURE — 25010000002 HYDROMORPHONE PER 4 MG: Performed by: NURSE ANESTHETIST, CERTIFIED REGISTERED

## 2022-01-06 PROCEDURE — 63710000001 INSULIN LISPRO (HUMAN) PER 5 UNITS: Performed by: INTERNAL MEDICINE

## 2022-01-06 PROCEDURE — 25010000002 PROPOFOL 10 MG/ML EMULSION: Performed by: NURSE ANESTHETIST, CERTIFIED REGISTERED

## 2022-01-06 PROCEDURE — 25010000002 ONDANSETRON PER 1 MG: Performed by: NURSE ANESTHETIST, CERTIFIED REGISTERED

## 2022-01-06 PROCEDURE — 88311 DECALCIFY TISSUE: CPT | Performed by: ORTHOPAEDIC SURGERY

## 2022-01-06 PROCEDURE — 25010000002 HYDROMORPHONE PER 4 MG: Performed by: ORTHOPAEDIC SURGERY

## 2022-01-06 DEVICE — BONE WAX
Type: IMPLANTABLE DEVICE | Site: LEG | Status: FUNCTIONAL
Brand: ETHICON

## 2022-01-06 RX ORDER — POLYETHYLENE GLYCOL 3350 17 G/17G
17 POWDER, FOR SOLUTION ORAL DAILY PRN
Status: DISCONTINUED | OUTPATIENT
Start: 2022-01-06 | End: 2022-01-11 | Stop reason: HOSPADM

## 2022-01-06 RX ORDER — DIPHENHYDRAMINE HYDROCHLORIDE 50 MG/ML
12.5 INJECTION INTRAMUSCULAR; INTRAVENOUS
Status: DISCONTINUED | OUTPATIENT
Start: 2022-01-06 | End: 2022-01-06 | Stop reason: HOSPADM

## 2022-01-06 RX ORDER — LIDOCAINE HYDROCHLORIDE 10 MG/ML
0.5 INJECTION, SOLUTION EPIDURAL; INFILTRATION; INTRACAUDAL; PERINEURAL ONCE AS NEEDED
Status: DISCONTINUED | OUTPATIENT
Start: 2022-01-06 | End: 2022-01-06 | Stop reason: HOSPADM

## 2022-01-06 RX ORDER — MAGNESIUM HYDROXIDE 1200 MG/15ML
LIQUID ORAL AS NEEDED
Status: DISCONTINUED | OUTPATIENT
Start: 2022-01-06 | End: 2022-01-06 | Stop reason: HOSPADM

## 2022-01-06 RX ORDER — MODAFINIL 100 MG/1
100 TABLET ORAL DAILY
Status: DISCONTINUED | OUTPATIENT
Start: 2022-01-06 | End: 2022-01-11 | Stop reason: HOSPADM

## 2022-01-06 RX ORDER — BUDESONIDE AND FORMOTEROL FUMARATE DIHYDRATE 160; 4.5 UG/1; UG/1
1 AEROSOL RESPIRATORY (INHALATION) 2 TIMES DAILY PRN
Status: DISCONTINUED | OUTPATIENT
Start: 2022-01-06 | End: 2022-01-11 | Stop reason: HOSPADM

## 2022-01-06 RX ORDER — ROCURONIUM BROMIDE 10 MG/ML
INJECTION, SOLUTION INTRAVENOUS AS NEEDED
Status: DISCONTINUED | OUTPATIENT
Start: 2022-01-06 | End: 2022-01-06 | Stop reason: SURG

## 2022-01-06 RX ORDER — AMOXICILLIN 250 MG
2 CAPSULE ORAL 2 TIMES DAILY
Status: DISCONTINUED | OUTPATIENT
Start: 2022-01-06 | End: 2022-01-11 | Stop reason: HOSPADM

## 2022-01-06 RX ORDER — OXYCODONE AND ACETAMINOPHEN 7.5; 325 MG/1; MG/1
1 TABLET ORAL EVERY 4 HOURS PRN
Status: DISCONTINUED | OUTPATIENT
Start: 2022-01-06 | End: 2022-01-06 | Stop reason: HOSPADM

## 2022-01-06 RX ORDER — DIPHENHYDRAMINE HCL 25 MG
25 CAPSULE ORAL
Status: DISCONTINUED | OUTPATIENT
Start: 2022-01-06 | End: 2022-01-06 | Stop reason: HOSPADM

## 2022-01-06 RX ORDER — NEOSTIGMINE METHYLSULFATE 0.5 MG/ML
INJECTION, SOLUTION INTRAVENOUS AS NEEDED
Status: DISCONTINUED | OUTPATIENT
Start: 2022-01-06 | End: 2022-01-06 | Stop reason: SURG

## 2022-01-06 RX ORDER — ONDANSETRON 2 MG/ML
4 INJECTION INTRAMUSCULAR; INTRAVENOUS EVERY 6 HOURS PRN
Status: DISCONTINUED | OUTPATIENT
Start: 2022-01-06 | End: 2022-01-11 | Stop reason: HOSPADM

## 2022-01-06 RX ORDER — LOSARTAN POTASSIUM 50 MG/1
50 TABLET ORAL DAILY
Status: DISCONTINUED | OUTPATIENT
Start: 2022-01-06 | End: 2022-01-06

## 2022-01-06 RX ORDER — HYDROCODONE BITARTRATE AND ACETAMINOPHEN 7.5; 325 MG/1; MG/1
1 TABLET ORAL ONCE AS NEEDED
Status: COMPLETED | OUTPATIENT
Start: 2022-01-06 | End: 2022-01-06

## 2022-01-06 RX ORDER — VANCOMYCIN HYDROCHLORIDE 1 G/200ML
1000 INJECTION, SOLUTION INTRAVENOUS ONCE
Status: COMPLETED | OUTPATIENT
Start: 2022-01-06 | End: 2022-01-06

## 2022-01-06 RX ORDER — FENTANYL CITRATE 50 UG/ML
INJECTION, SOLUTION INTRAMUSCULAR; INTRAVENOUS AS NEEDED
Status: DISCONTINUED | OUTPATIENT
Start: 2022-01-06 | End: 2022-01-06 | Stop reason: SURG

## 2022-01-06 RX ORDER — DEXTROSE MONOHYDRATE 25 G/50ML
25 INJECTION, SOLUTION INTRAVENOUS
Status: DISCONTINUED | OUTPATIENT
Start: 2022-01-06 | End: 2022-01-11 | Stop reason: HOSPADM

## 2022-01-06 RX ORDER — GLYCOPYRROLATE 0.2 MG/ML
INJECTION INTRAMUSCULAR; INTRAVENOUS AS NEEDED
Status: DISCONTINUED | OUTPATIENT
Start: 2022-01-06 | End: 2022-01-06 | Stop reason: SURG

## 2022-01-06 RX ORDER — ROPIVACAINE HYDROCHLORIDE 5 MG/ML
INJECTION, SOLUTION EPIDURAL; INFILTRATION; PERINEURAL
Status: COMPLETED | OUTPATIENT
Start: 2022-01-06 | End: 2022-01-06

## 2022-01-06 RX ORDER — PROMETHAZINE HYDROCHLORIDE 25 MG/1
25 TABLET ORAL ONCE AS NEEDED
Status: DISCONTINUED | OUTPATIENT
Start: 2022-01-06 | End: 2022-01-06 | Stop reason: HOSPADM

## 2022-01-06 RX ORDER — NALOXONE HCL 0.4 MG/ML
0.4 VIAL (ML) INJECTION
Status: DISCONTINUED | OUTPATIENT
Start: 2022-01-06 | End: 2022-01-11 | Stop reason: HOSPADM

## 2022-01-06 RX ORDER — PROMETHAZINE HYDROCHLORIDE 25 MG/1
25 SUPPOSITORY RECTAL ONCE AS NEEDED
Status: DISCONTINUED | OUTPATIENT
Start: 2022-01-06 | End: 2022-01-06 | Stop reason: HOSPADM

## 2022-01-06 RX ORDER — ONDANSETRON 2 MG/ML
INJECTION INTRAMUSCULAR; INTRAVENOUS AS NEEDED
Status: DISCONTINUED | OUTPATIENT
Start: 2022-01-06 | End: 2022-01-06 | Stop reason: SURG

## 2022-01-06 RX ORDER — ONDANSETRON 4 MG/1
4 TABLET, FILM COATED ORAL EVERY 6 HOURS PRN
Status: DISCONTINUED | OUTPATIENT
Start: 2022-01-06 | End: 2022-01-11 | Stop reason: HOSPADM

## 2022-01-06 RX ORDER — BISACODYL 5 MG/1
5 TABLET, DELAYED RELEASE ORAL DAILY PRN
Status: DISCONTINUED | OUTPATIENT
Start: 2022-01-06 | End: 2022-01-11 | Stop reason: HOSPADM

## 2022-01-06 RX ORDER — PREGABALIN 25 MG/1
50 CAPSULE ORAL DAILY
Status: DISCONTINUED | OUTPATIENT
Start: 2022-01-06 | End: 2022-01-11 | Stop reason: HOSPADM

## 2022-01-06 RX ORDER — ONDANSETRON 2 MG/ML
4 INJECTION INTRAMUSCULAR; INTRAVENOUS ONCE AS NEEDED
Status: DISCONTINUED | OUTPATIENT
Start: 2022-01-06 | End: 2022-01-06 | Stop reason: HOSPADM

## 2022-01-06 RX ORDER — IBUPROFEN 600 MG/1
600 TABLET ORAL ONCE AS NEEDED
Status: DISCONTINUED | OUTPATIENT
Start: 2022-01-06 | End: 2022-01-06 | Stop reason: HOSPADM

## 2022-01-06 RX ORDER — PROPOFOL 10 MG/ML
VIAL (ML) INTRAVENOUS AS NEEDED
Status: DISCONTINUED | OUTPATIENT
Start: 2022-01-06 | End: 2022-01-06 | Stop reason: SURG

## 2022-01-06 RX ORDER — PREGABALIN 25 MG/1
25 CAPSULE ORAL EVERY 12 HOURS SCHEDULED
Status: DISCONTINUED | OUTPATIENT
Start: 2022-01-06 | End: 2022-01-07

## 2022-01-06 RX ORDER — MIDAZOLAM HYDROCHLORIDE 1 MG/ML
1 INJECTION INTRAMUSCULAR; INTRAVENOUS
Status: DISCONTINUED | OUTPATIENT
Start: 2022-01-06 | End: 2022-01-06 | Stop reason: HOSPADM

## 2022-01-06 RX ORDER — LABETALOL HYDROCHLORIDE 5 MG/ML
5 INJECTION, SOLUTION INTRAVENOUS
Status: DISCONTINUED | OUTPATIENT
Start: 2022-01-06 | End: 2022-01-06 | Stop reason: HOSPADM

## 2022-01-06 RX ORDER — HYDRALAZINE HYDROCHLORIDE 20 MG/ML
5 INJECTION INTRAMUSCULAR; INTRAVENOUS
Status: DISCONTINUED | OUTPATIENT
Start: 2022-01-06 | End: 2022-01-06 | Stop reason: HOSPADM

## 2022-01-06 RX ORDER — FENTANYL CITRATE 50 UG/ML
INJECTION, SOLUTION INTRAMUSCULAR; INTRAVENOUS
Status: COMPLETED | OUTPATIENT
Start: 2022-01-06 | End: 2022-01-06

## 2022-01-06 RX ORDER — NALOXONE HCL 0.4 MG/ML
0.2 VIAL (ML) INJECTION AS NEEDED
Status: DISCONTINUED | OUTPATIENT
Start: 2022-01-06 | End: 2022-01-06 | Stop reason: HOSPADM

## 2022-01-06 RX ORDER — DEXAMETHASONE SODIUM PHOSPHATE 10 MG/ML
INJECTION INTRAMUSCULAR; INTRAVENOUS AS NEEDED
Status: DISCONTINUED | OUTPATIENT
Start: 2022-01-06 | End: 2022-01-06 | Stop reason: SURG

## 2022-01-06 RX ORDER — ROPIVACAINE HYDROCHLORIDE 2 MG/ML
INJECTION, SOLUTION EPIDURAL; INFILTRATION; PERINEURAL
Status: COMPLETED | OUTPATIENT
Start: 2022-01-06 | End: 2022-01-06

## 2022-01-06 RX ORDER — SODIUM CHLORIDE, SODIUM LACTATE, POTASSIUM CHLORIDE, CALCIUM CHLORIDE 600; 310; 30; 20 MG/100ML; MG/100ML; MG/100ML; MG/100ML
9 INJECTION, SOLUTION INTRAVENOUS CONTINUOUS
Status: DISCONTINUED | OUTPATIENT
Start: 2022-01-06 | End: 2022-01-06

## 2022-01-06 RX ORDER — FENTANYL CITRATE 50 UG/ML
50 INJECTION, SOLUTION INTRAMUSCULAR; INTRAVENOUS
Status: DISCONTINUED | OUTPATIENT
Start: 2022-01-06 | End: 2022-01-06 | Stop reason: HOSPADM

## 2022-01-06 RX ORDER — SODIUM CHLORIDE 9 MG/ML
100 INJECTION, SOLUTION INTRAVENOUS CONTINUOUS
Status: DISCONTINUED | OUTPATIENT
Start: 2022-01-06 | End: 2022-01-07

## 2022-01-06 RX ORDER — MIDAZOLAM HYDROCHLORIDE 1 MG/ML
INJECTION INTRAMUSCULAR; INTRAVENOUS
Status: COMPLETED | OUTPATIENT
Start: 2022-01-06 | End: 2022-01-06

## 2022-01-06 RX ORDER — ROSUVASTATIN CALCIUM 40 MG/1
40 TABLET, COATED ORAL DAILY
Status: DISCONTINUED | OUTPATIENT
Start: 2022-01-06 | End: 2022-01-11 | Stop reason: HOSPADM

## 2022-01-06 RX ORDER — LAMOTRIGINE 25 MG/1
25 TABLET ORAL 2 TIMES DAILY
Status: DISCONTINUED | OUTPATIENT
Start: 2022-01-06 | End: 2022-01-11 | Stop reason: HOSPADM

## 2022-01-06 RX ORDER — MIDAZOLAM HYDROCHLORIDE 1 MG/ML
2 INJECTION INTRAMUSCULAR; INTRAVENOUS ONCE
Status: DISCONTINUED | OUTPATIENT
Start: 2022-01-06 | End: 2022-01-06 | Stop reason: HOSPADM

## 2022-01-06 RX ORDER — OXYCODONE HYDROCHLORIDE AND ACETAMINOPHEN 5; 325 MG/1; MG/1
1 TABLET ORAL EVERY 4 HOURS PRN
Status: DISCONTINUED | OUTPATIENT
Start: 2022-01-06 | End: 2022-01-09

## 2022-01-06 RX ORDER — LIDOCAINE HYDROCHLORIDE 20 MG/ML
INJECTION, SOLUTION INFILTRATION; PERINEURAL AS NEEDED
Status: DISCONTINUED | OUTPATIENT
Start: 2022-01-06 | End: 2022-01-06 | Stop reason: SURG

## 2022-01-06 RX ORDER — HYDROMORPHONE HYDROCHLORIDE 1 MG/ML
0.5 INJECTION, SOLUTION INTRAMUSCULAR; INTRAVENOUS; SUBCUTANEOUS
Status: DISCONTINUED | OUTPATIENT
Start: 2022-01-06 | End: 2022-01-06 | Stop reason: HOSPADM

## 2022-01-06 RX ORDER — OMEGA-3S/DHA/EPA/FISH OIL/D3 300MG-1000
400 CAPSULE ORAL DAILY
Status: DISCONTINUED | OUTPATIENT
Start: 2022-01-06 | End: 2022-01-11 | Stop reason: HOSPADM

## 2022-01-06 RX ORDER — INSULIN LISPRO 100 [IU]/ML
0-9 INJECTION, SOLUTION INTRAVENOUS; SUBCUTANEOUS
Status: DISCONTINUED | OUTPATIENT
Start: 2022-01-06 | End: 2022-01-11 | Stop reason: HOSPADM

## 2022-01-06 RX ORDER — FENTANYL CITRATE 50 UG/ML
100 INJECTION, SOLUTION INTRAMUSCULAR; INTRAVENOUS
Status: DISCONTINUED | OUTPATIENT
Start: 2022-01-06 | End: 2022-01-06 | Stop reason: HOSPADM

## 2022-01-06 RX ORDER — SODIUM CHLORIDE 0.9 % (FLUSH) 0.9 %
3 SYRINGE (ML) INJECTION EVERY 12 HOURS SCHEDULED
Status: DISCONTINUED | OUTPATIENT
Start: 2022-01-06 | End: 2022-01-06 | Stop reason: HOSPADM

## 2022-01-06 RX ORDER — FAMOTIDINE 20 MG/1
40 TABLET, FILM COATED ORAL DAILY
Status: DISCONTINUED | OUTPATIENT
Start: 2022-01-06 | End: 2022-01-11 | Stop reason: HOSPADM

## 2022-01-06 RX ORDER — FLUMAZENIL 0.1 MG/ML
0.2 INJECTION INTRAVENOUS AS NEEDED
Status: DISCONTINUED | OUTPATIENT
Start: 2022-01-06 | End: 2022-01-06 | Stop reason: HOSPADM

## 2022-01-06 RX ORDER — HYDROMORPHONE HYDROCHLORIDE 1 MG/ML
0.5 INJECTION, SOLUTION INTRAMUSCULAR; INTRAVENOUS; SUBCUTANEOUS EVERY 4 HOURS PRN
Status: DISCONTINUED | OUTPATIENT
Start: 2022-01-06 | End: 2022-01-09

## 2022-01-06 RX ORDER — CEFAZOLIN SODIUM 2 G/100ML
2 INJECTION, SOLUTION INTRAVENOUS EVERY 8 HOURS
Status: COMPLETED | OUTPATIENT
Start: 2022-01-06 | End: 2022-01-07

## 2022-01-06 RX ORDER — SODIUM CHLORIDE 0.9 % (FLUSH) 0.9 %
3-10 SYRINGE (ML) INJECTION AS NEEDED
Status: DISCONTINUED | OUTPATIENT
Start: 2022-01-06 | End: 2022-01-06 | Stop reason: HOSPADM

## 2022-01-06 RX ORDER — FENTANYL CITRATE 50 UG/ML
50 INJECTION, SOLUTION INTRAMUSCULAR; INTRAVENOUS ONCE
Status: DISCONTINUED | OUTPATIENT
Start: 2022-01-06 | End: 2022-01-06 | Stop reason: HOSPADM

## 2022-01-06 RX ORDER — NICOTINE POLACRILEX 4 MG
15 LOZENGE BUCCAL
Status: DISCONTINUED | OUTPATIENT
Start: 2022-01-06 | End: 2022-01-11 | Stop reason: HOSPADM

## 2022-01-06 RX ORDER — BISACODYL 10 MG
10 SUPPOSITORY, RECTAL RECTAL DAILY PRN
Status: DISCONTINUED | OUTPATIENT
Start: 2022-01-06 | End: 2022-01-11 | Stop reason: HOSPADM

## 2022-01-06 RX ORDER — EPHEDRINE SULFATE 50 MG/ML
5 INJECTION, SOLUTION INTRAVENOUS ONCE AS NEEDED
Status: DISCONTINUED | OUTPATIENT
Start: 2022-01-06 | End: 2022-01-06 | Stop reason: HOSPADM

## 2022-01-06 RX ADMIN — VANCOMYCIN HYDROCHLORIDE 1000 MG: 1 INJECTION, SOLUTION INTRAVENOUS at 07:30

## 2022-01-06 RX ADMIN — MODAFINIL 100 MG: 100 TABLET ORAL at 16:06

## 2022-01-06 RX ADMIN — OXYCODONE AND ACETAMINOPHEN 1 TABLET: 5; 325 TABLET ORAL at 17:14

## 2022-01-06 RX ADMIN — PREGABALIN 25 MG: 25 CAPSULE ORAL at 20:34

## 2022-01-06 RX ADMIN — LIDOCAINE HYDROCHLORIDE 100 MG: 20 INJECTION, SOLUTION INFILTRATION; PERINEURAL at 08:36

## 2022-01-06 RX ADMIN — ROPIVACAINE HYDROCHLORIDE 10 ML: 2 INJECTION, SOLUTION EPIDURAL; INFILTRATION at 07:42

## 2022-01-06 RX ADMIN — OXYCODONE AND ACETAMINOPHEN 1 TABLET: 5; 325 TABLET ORAL at 21:54

## 2022-01-06 RX ADMIN — FENTANYL CITRATE 100 MCG: 0.05 INJECTION, SOLUTION INTRAMUSCULAR; INTRAVENOUS at 07:33

## 2022-01-06 RX ADMIN — NEOSTIGMINE METHYLSULFATE 3 MG: 0.5 INJECTION INTRAVENOUS at 11:24

## 2022-01-06 RX ADMIN — PREGABALIN 50 MG: 25 CAPSULE ORAL at 16:05

## 2022-01-06 RX ADMIN — ONDANSETRON 4 MG: 2 INJECTION INTRAMUSCULAR; INTRAVENOUS at 11:05

## 2022-01-06 RX ADMIN — HYDROMORPHONE HYDROCHLORIDE 0.5 MG: 1 INJECTION, SOLUTION INTRAMUSCULAR; INTRAVENOUS; SUBCUTANEOUS at 16:05

## 2022-01-06 RX ADMIN — PREGABALIN 25 MG: 25 CAPSULE ORAL at 16:05

## 2022-01-06 RX ADMIN — FENTANYL CITRATE 100 MCG: 0.05 INJECTION, SOLUTION INTRAMUSCULAR; INTRAVENOUS at 08:36

## 2022-01-06 RX ADMIN — CHOLECALCIFEROL TAB 10 MCG (400 UNIT) 400 UNITS: 10 TAB at 17:14

## 2022-01-06 RX ADMIN — HYDROMORPHONE HYDROCHLORIDE 0.5 MG: 1 INJECTION, SOLUTION INTRAMUSCULAR; INTRAVENOUS; SUBCUTANEOUS at 20:33

## 2022-01-06 RX ADMIN — CEFAZOLIN SODIUM 2 G: 2 INJECTION, SOLUTION INTRAVENOUS at 22:59

## 2022-01-06 RX ADMIN — ROSUVASTATIN CALCIUM 40 MG: 40 TABLET, FILM COATED ORAL at 17:14

## 2022-01-06 RX ADMIN — INSULIN LISPRO 6 UNITS: 100 INJECTION, SOLUTION INTRAVENOUS; SUBCUTANEOUS at 17:14

## 2022-01-06 RX ADMIN — PROPOFOL 250 MG: 10 INJECTION, EMULSION INTRAVENOUS at 08:36

## 2022-01-06 RX ADMIN — SODIUM CHLORIDE, POTASSIUM CHLORIDE, SODIUM LACTATE AND CALCIUM CHLORIDE: 600; 310; 30; 20 INJECTION, SOLUTION INTRAVENOUS at 10:28

## 2022-01-06 RX ADMIN — HYDROCODONE BITARTRATE AND ACETAMINOPHEN 1 TABLET: 7.5; 325 TABLET ORAL at 12:39

## 2022-01-06 RX ADMIN — SODIUM CHLORIDE, POTASSIUM CHLORIDE, SODIUM LACTATE AND CALCIUM CHLORIDE 9 ML/HR: 600; 310; 30; 20 INJECTION, SOLUTION INTRAVENOUS at 08:16

## 2022-01-06 RX ADMIN — ROPIVACAINE HYDROCHLORIDE 20 ML: 5 INJECTION, SOLUTION EPIDURAL; INFILTRATION; PERINEURAL at 07:42

## 2022-01-06 RX ADMIN — DEXAMETHASONE SODIUM PHOSPHATE 6 MG: 10 INJECTION INTRAMUSCULAR; INTRAVENOUS at 08:45

## 2022-01-06 RX ADMIN — HYDROMORPHONE HYDROCHLORIDE 0.5 MG: 1 INJECTION, SOLUTION INTRAMUSCULAR; INTRAVENOUS; SUBCUTANEOUS at 12:23

## 2022-01-06 RX ADMIN — GLYCOPYRROLATE 0.4 MG: 0.2 INJECTION INTRAMUSCULAR; INTRAVENOUS at 11:24

## 2022-01-06 RX ADMIN — MIDAZOLAM 2 MG: 1 INJECTION INTRAMUSCULAR; INTRAVENOUS at 07:33

## 2022-01-06 RX ADMIN — FAMOTIDINE 40 MG: 20 TABLET, FILM COATED ORAL at 16:06

## 2022-01-06 RX ADMIN — ROCURONIUM BROMIDE 50 MG: 50 INJECTION INTRAVENOUS at 08:36

## 2022-01-06 RX ADMIN — SODIUM CHLORIDE 100 ML/HR: 9 INJECTION, SOLUTION INTRAVENOUS at 16:06

## 2022-01-06 RX ADMIN — DOCUSATE SODIUM 50MG AND SENNOSIDES 8.6MG 2 TABLET: 8.6; 5 TABLET, FILM COATED ORAL at 20:33

## 2022-01-06 RX ADMIN — LAMOTRIGINE 25 MG: 25 TABLET ORAL at 20:30

## 2022-01-06 NOTE — ANESTHESIA PREPROCEDURE EVALUATION
Anesthesia Evaluation     Patient summary reviewed and Nursing notes reviewed   NPO Solid Status: > 8 hours             Airway   Mallampati: III  TM distance: >3 FB  Neck ROM: full  Possible difficult intubation  Dental - normal exam     Pulmonary - normal exam   (+) sleep apnea,   Cardiovascular - normal exam    (+) hypertension,       Neuro/Psych- negative ROS  GI/Hepatic/Renal/Endo    (+) obesity,   diabetes mellitus,     Musculoskeletal (-) negative ROS    Abdominal  - normal exam   Substance History - negative use     OB/GYN negative ob/gyn ROS         Other                      Anesthesia Plan    ASA 3     general with block     intravenous induction     Anesthetic plan, all risks, benefits, and alternatives have been provided, discussed and informed consent has been obtained with: patient.    Plan discussed with CRNA.

## 2022-01-06 NOTE — DISCHARGE PLACEMENT REQUEST
"Montez Boss (51 y.o. Male)             Date of Birth Social Security Number Address Home Phone MRN    1970  [address]  [place] [address] [phone] [id number]    Gnosticism Marital Status             Protestant        Admission Date Admission Type Admitting Provider Attending Provider Department, Room/Bed    1/6/22 Elective Enrique Jain MD Price, Shawn L, MD [address], P799/1    Discharge Date Discharge Disposition Discharge Destination                         Attending Provider: Enrique Jain MD    Allergies: No Known Allergies    Isolation: None   Infection: None   Code Status: Prior   Advance Care Planning Activity    Ht: 182.9 cm (72\")   Wt: 117 kg (258 lb)    Admission Cmt: None   Principal Problem: Osteomyelitis of great toe of left foot (HCC) [M86.9] More...                 Active Insurance as of 1/6/2022     Primary Coverage     Payor Plan Insurance Group Employer/Plan Group    [facility] BLUE CROSS ANTHEM BLUE CROSS BLUE [facility] [id number]     Payor Plan Address Payor Plan Phone Number Payor Plan Fax Number Effective Dates    [address] [phone]  9/6/2021 - None Entered    [facility] [id number]       Subscriber Name Subscriber Birth Date Member ID       MONTEZ BOSS 1970 [id number]           Secondary Coverage     Payor Plan Insurance Group Employer/Plan Group    MEDICARE MEDICARE A ONLY      Payor Plan Address Payor Plan Phone Number Payor Plan Fax Number Effective Dates    [address] [phone]  8/1/2016 - None Entered    [facility] [id number]       Subscriber Name Subscriber Birth Date Member ID       MONTEZ BOSS 1970 [id number]                 Emergency Contacts      (Rel.) Home Phone Work Phone Mobile Phone    TETE BOSS (Spouse) [phone] -- [phone]            "

## 2022-01-06 NOTE — ANESTHESIA PROCEDURE NOTES
Peripheral Block    Pre-sedation assessment completed: 1/6/2022 7:32 AM    Patient reassessed immediately prior to procedure    Patient location during procedure: pre-op  Start time: 1/6/2022 7:32 AM  Stop time: 1/6/2022 7:42 AM  Reason for block: at surgeon's request and post-op pain management  Performed by  Anesthesiologist: Arcadio Montalvo MD  Preanesthetic Checklist  Completed: patient identified, IV checked, site marked, risks and benefits discussed, surgical consent, monitors and equipment checked, pre-op evaluation and timeout performed  Prep:  Pt Position: supine  Sterile barriers:cap, gloves, mask and sterile barriers  Prep: ChloraPrep  Patient monitoring: blood pressure monitoring, continuous pulse oximetry and EKG  Procedure    Sedation: yes  Performed under: local infiltration  Guidance:ultrasound guided and nerve stimulator    ULTRASOUND INTERPRETATION.  Using ultrasound guidance a 22 G gauge needle was placed in close proximity to the femoral and sciatic nerve, at which point, under ultrasound guidance anesthetic was injected in the area of the nerve and spread of the anesthesia was seen on ultrasound in close proximity thereto.  There were no abnormalities seen on ultrasound; a digital image was taken; and the patient tolerated the procedure with no complications. Images:still images obtained, printed/placed on chart    Laterality:left  Block Type:sciatic and femoral  Injection Technique:single-shot  Needle Type:echogenic  Needle Gauge:22 G  Resistance on Injection: less than 15 psi    Medications Used: ropivacaine (NAROPIN) 0.5 % injection, 20 mL  ropivacaine (NAROPIN) 0.2 % injection, 10 mL  Med administered at 1/6/2022 7:42 AM      Post Assessment  Injection Assessment: negative aspiration for heme, no paresthesia on injection and incremental injection  Patient Tolerance:comfortable throughout block  Complications:no  Additional Notes

## 2022-01-06 NOTE — PROGRESS NOTES
Continued Stay Note  [facility]     Patient Name: Montez Guy  MRN: [id number]  Today's Date: 1/6/2022    Admit Date: 1/6/2022     Discharge Plan     Row Name 01/06/22 1524       Plan    Plan Comments Per [facility] they will need PT/OT notes to evaluate and then start auth if appropriate. CCP to follow.    Row Name 01/06/22 1350       Plan    Plan [facility] in [place]    Patient/Family in Agreement with Plan yes    Plan Comments Spoke with pt/pts wife at bedside, verified information on facesheet and explained the role of CCP. Pt is requesting to d/c to [facility] in [place], referral was sent in Epic. Awaiting approval/bed availability, pt will require precert. CCP to follow.               Discharge Codes    No documentation.                     Ginna Castle RN

## 2022-01-06 NOTE — ANESTHESIA PROCEDURE NOTES
Airway  Urgency: elective    Date/Time: 1/6/2022 8:40 AM  Airway not difficult    General Information and Staff    Patient location during procedure: OR  Anesthesiologist: Arcadio Montalvo MD  CRNA: Kellen Coley CRNA    Indications and Patient Condition  Indications for airway management: airway protection    Preoxygenated: yes  Mask difficulty assessment: 1 - vent by mask    Final Airway Details  Final airway type: endotracheal airway      Successful airway: ETT  Cuffed: yes   Successful intubation technique: direct laryngoscopy  Endotracheal tube insertion site: oral  Blade: Jacobs  Blade size: 2  ETT size (mm): 7.5  Cormack-Lehane Classification: grade I - full view of glottis  Placement verified by: chest auscultation and capnometry   Measured from: lips  ETT/EBT  to lips (cm): 23  Number of attempts at approach: 1  Assessment: lips, teeth, and gum same as pre-op and atraumatic intubation    Additional Comments  Atraumatic, MOP to cuff, BSBE, no change to dentition, secured with tape

## 2022-01-06 NOTE — PLAN OF CARE
Goal Outcome Evaluation:  Plan of Care Reviewed With: patient        Progress: improving  Outcome Summary: Pt is 50 y/o male s/p left BKA. Dressing is cdi, vss, n/v intact. Pt voiding per urinal. Pain controlled with po meds. Educated pt on pain management. WCTM.

## 2022-01-06 NOTE — SIGNIFICANT NOTE
Patient wife updated on patient status, P799 room assignment, questions encouraged. Directed to patient ready room

## 2022-01-06 NOTE — ANESTHESIA POSTPROCEDURE EVALUATION
Patient: Montez Guy    Procedure Summary     Date: 01/06/22 Room / Location: [facility] OR [address] [place] [address] MAIN OR    Anesthesia Start: 0828 Anesthesia Stop: 1141    Procedure: LEFT BELOW KNEE AMPUTATION (Left Leg Lower) Diagnosis:       Osteomyelitis of great toe of left foot (HCC)      (Osteomyelitis of great toe of left foot (HCC) [M86.9])    Surgeons: Enrique Jain MD Provider: Arcadio Montalvo MD    Anesthesia Type: general with block ASA Status: 3          Anesthesia Type: general with block    Vitals  Vitals Value Taken Time   /82 01/06/22 1230   Temp 37.2 °C (98.9 °F) 01/06/22 1138   Pulse 74 01/06/22 1232   Resp 16 01/06/22 1230   SpO2 97 % 01/06/22 1232   Vitals shown include unvalidated device data.        Post Anesthesia Care and Evaluation    Patient location during evaluation: PACU  Patient participation: complete - patient participated  Level of consciousness: awake and alert  Pain management: adequate  Airway patency: patent  Anesthetic complications: No anesthetic complications    Cardiovascular status: acceptable  Respiratory status: acceptable  Hydration status: acceptable    Comments: /82   Pulse 72   Temp 37.2 °C (98.9 °F) (Oral)   Resp 16   SpO2 95%

## 2022-01-06 NOTE — INTERVAL H&P NOTE
H&P reviewed. The patient was examined and there are no changes to the H&P.  Consent verified.  Patient marked.  R/B/A discussed.    Enrique Jain M.D.

## 2022-01-06 NOTE — OP NOTE
Orthopedic Operative Note     Name: Montez Guy   MRN: [id number]    :  1970    Date of Surgery:2022     Pre-op Diagnosis:   1.  Diabetic neuropathy  2.  Peripheral vascular disease  3.  Chronic ulceration, MRSA with associated osteomyelitis left great toe left small toe     Post-op Diagnosis:  Same       Procedure(s):  Left below-knee amputation     Surgeon(s):  Enrique Jain MD     Anesthesia:  General with block     Staff:   Circulator: Colleen Lebron RN; Rocio Angel RN  Scrub Person: Allison Gilmore    Estimated Blood Loss: 100ml    Specimens:                Order Name Source Comment Collection Info Order Time   TISSUE PATHOLOGY EXAM Leg, Left  Collected By: Enrique Jain MD 2022 10:16 AM     Release to patient   Immediate            Complications:  None     Implants:  None     Indications: Mr. Guy is a 51 y.o. male diabetes associated neuropathy peripheral vascular disease and osteomyelitis involving the left great and small toe.  Instead of amputations about the foot the patient wanted to proceed with more definitive surgical correction because of failed wound care and antimicrobials and his experience with his RLE.  Risks of the procedure were discussed these included were not limited to bleeding, infection, damage to adjacent structures, DVT, PE, fracture, need for additional procedures, phantom limb pain, neuropathic pain, wound necrosis. The patient understood the risks and elected to proceed.     Description of procedure:     Montez Guy was seen and evaluated in preoperative holding area found to be neurovascularly intact the operative extremity was confirmed and marked as the operative extremity.  Consent was verified.     The patient  was transferred to the OR#25 at [facility].  Satisfactory anesthesia was induced and the patient was positioned on the operative table.  The operative extremity was preprepped with alcohol then prepped and  draped normal sterile fashion with ChloraPrep.  Timeout was performed on agreement patient's identity, laterality procedure to be performed and 1 g of vancomycin was administered prior to incision.  Gravity was utilized to exsanguinate the extremity.  Tourniquet was inflated at the level of the thigh at 250 mmHg for a total of 64 minutes.       Approximately 15 cm from the medial joint line, anterior flap was marked this was followed by marking of the long posterior flap.  Skin incision was made anteriorly focusing first on the anterior compartment.  Bovie cautery was utilized to dissect through the anterior compartment musculature.  The neurovascular bundle was identified nerves were injected cut sharply and allowed to retract proximally and vessels were double ligated and cut proximal to the planned level of bony resection.  Then moved to the fibula and dissected approximately 1 cm proximal to the plan tibial cut dissection.  The was peroneal vessels were identified and tied off.  Moving, medially, the saphenous vein and nerve were identified and ligated appropriately.  The posterior flap was then made, incising the skin medially, laterally and distally.  The neurovascular bundle was identified at the ankle and clamped.  A saw was utilized to cut the tibia followed by the fibula 1 cm proximal to the distal tibial cut.  Amputation knife was then utilized to dissect the remaining portion of the posterior compartment and the limb was passed off. The sural nerve was identified and sharply cut and the lesser saphenous veins were cauterized.    Pressure was held over the wound and the tourniquet was deflated.  After adequate hemostasis, the anterior aspect of the tibia was beveled.  A drill was placed in the anterior tibial cortex for the purposes of myodesis.  [place] sutures were placed with #5 Tycron suture into the gastroc.  The limbs were then passed through the bony tunnels.  The remaining musculature of the  gastroc was reapproximated to the periosteum medially and to the anterior compartment and anterior compartment  fascia anteroaterally.  This was performed with 0 Vicryl.  Prior to definitive myodesis the drain was placed deep.  The deep dermal layers were reapproximated with 2-0 Vicryl.  Laterally there was found to be some patulous skin and the dog ear which was corrected.     The skin was reapproximated with staples.  Dressings were then applied and the residual limb was placed in a Rooke boot.    End of the procedure all sponge and needle counts were correct.  I was present conducted the entire to the procedure.          Enrique Jain M.D.  1/6/2022

## 2022-01-06 NOTE — CONSULTS
Patient Name:  Montez Guy  YOB: 1970  MRN:  [id number]  Admit Date:  1/6/2022  Patient Care Team:  Jeremiah Sanchez MD as PCP - General (Hospitalist)  Karen Beauchamp MD as Consulting Physician (Pulmonary Disease)  Karen Beauchamp MD as Consulting Physician (Pulmonary Disease)      Subjective   History Present Illness   Referring Provider: Dr. Enrique Jain  Reason for Consultation: hypertension, type 2 DM, ZENA      Mr. Guy is a 51 y.o. former smoker with a history of HTN, HLD, depression, anxiety, and type 2 DM with peripheral neuropathy complicated by multiple diabetic foot ulcers with osteomyelitis resulting in a right below-the-knee amputation. He has had the same issues in his left lower extremity and is now admitted to the orthopedic service at [facility] for definitive management which was a left below-the-knee amputation done on 1/6/22. I was consulted for evaluation and inpatient management of his medical problems including hypertension, type 2 DM, and ZENA. He states he is very compliant with his CPAP and he has brought this with him. He has had decent blood glucose control at home-was previously on insulin but now takes metformin and ozempic, the latter of which is due tomorrow.      History of Present Illness  Review of Systems   Constitutional: Negative for appetite change, chills and fever.   HENT: Negative for congestion, sore throat and trouble swallowing.    Eyes: Negative for redness and visual disturbance.   Respiratory: Negative for cough, choking and shortness of breath.    Cardiovascular: Negative for chest pain and palpitations.   Gastrointestinal: Negative for abdominal pain, constipation, diarrhea, nausea and vomiting.   Endocrine: Negative for cold intolerance and heat intolerance.   Genitourinary: Negative for difficulty urinating and dysuria.   Musculoskeletal: Negative for arthralgias and neck pain.   Skin: Negative for pallor and rash.   Neurological:  Negative for dizziness, light-headedness and headaches.   Hematological: Negative for adenopathy. Does not bruise/bleed easily.   Psychiatric/Behavioral: Negative for confusion and decreased concentration.        Personal History     Past Medical History:   Diagnosis Date   • Cellulitis of left lower extremity 2020   • Diabetes (HCC)    • History of coma     X 4 MONTHS   • History of weight loss    • Hypertension    • Impaired lung function     LEFT   • MRSA (methicillin resistant staph aureus) culture positive     RIGHT LEG- OSTEOMYELYTIS-SEPSIS-BLOOD LOSS   • Obesity (BMI 30-39.9) 2020   • Obstructive sleep apnea syndrome 2020    CPAP   • Osteomyelitis of great toe of left foot (HCC)    • Pneumonia     necrotizing pneumonia     Past Surgical History:   Procedure Laterality Date   • BELOW KNEE LEG AMPUTATION Right    • DEBRIDEMENT LEG Right     MULTIPLE   • FLEXIBLE BRONCHOSCOPY W/ BRONCHOPULMONARY LAVAGE      MULTIPLE   • PILONIDAL CYSTECTOMY     • RHINOPLASTY     • TOE AMPUTATION     • TRACHEOSTOMY     • TRACHEOSTOMY CLOSURE/STOMA REVISION       Family History   Problem Relation Age of Onset   • Cancer Mother    • Cancer Maternal Grandmother    • Malig Hyperthermia Neg Hx      Social History     Tobacco Use   • Smoking status: Former Smoker     Types: Cigarettes     Quit date:      Years since quittin.0   • Smokeless tobacco: Never Used   Vaping Use   • Vaping Use: Never used   Substance Use Topics   • Alcohol use: Never   • Drug use: Never     No current facility-administered medications on file prior to encounter.     Current Outpatient Medications on File Prior to Encounter   Medication Sig Dispense Refill   • aspirin-acetaminophen-caffeine (EXCEDRIN MIGRAINE) 250-250-65 MG per tablet Take 2 tablets by mouth Every 6 (Six) Hours As Needed for Headache, Mild Pain  or Moderate Pain .     • budesonide-formoterol (SYMBICORT) 160-4.5 MCG/ACT inhaler Inhale 1 puff 2 (Two) Times a Day As  Needed (pt uses PRN).     • lamoTRIgine (LaMICtal) 25 MG tablet Take 1 tablet by mouth 2 (Two) Times a Day. For mood stabilizer 180 tablet 0   • metFORMIN (GLUCOPHAGE) 500 MG tablet Take 0.5 tabs po bid x 2 weeks, then start taking 1 tab po bid. (Patient taking differently: 1,000 mg.) 60 tablet 0   • modafinil (PROVIGIL) 100 MG tablet Take 100 mg by mouth Daily. RX is for BID, pt just takes QD     • Vortioxetine HBr (Trintellix) 5 MG tablet Take 5 mg by mouth Daily With Breakfast. 28 tablet 0     No Known Allergies    Objective    Objective     Vital Signs  Temp:  [97.5 °F (36.4 °C)-98.9 °F (37.2 °C)] 97.5 °F (36.4 °C)  Heart Rate:  [67-99] 67  Resp:  [13-16] 16  BP: (118-135)/(72-94) 118/72  SpO2:  [95 %-99 %] 98 %  on  Flow (L/min):  [2-4] 2;   Device (Oxygen Therapy): nasal cannula  There is no height or weight on file to calculate BMI.    Physical Exam  Vitals and nursing note reviewed.   Constitutional:       General: He is not in acute distress.     Appearance: He is not toxic-appearing or diaphoretic.   HENT:      Head: Normocephalic and atraumatic.      Nose: Nose normal.      Mouth/Throat:      Mouth: Mucous membranes are moist.      Pharynx: Oropharynx is clear.   Eyes:      Extraocular Movements: Extraocular movements intact.      Conjunctiva/sclera: Conjunctivae normal.      Pupils: Pupils are equal, round, and reactive to light.   Cardiovascular:      Rate and Rhythm: Normal rate and regular rhythm.   Pulmonary:      Effort: Pulmonary effort is normal.      Breath sounds: Normal breath sounds.   Abdominal:      General: Bowel sounds are normal.      Palpations: Abdomen is soft.      Tenderness: There is no abdominal tenderness.   Musculoskeletal:         General: No swelling.      Cervical back: Normal range of motion and neck supple.      Comments: Previous right BKA well-healed  Recent Left BKA dressed and braced with DONNIE drain scant sanguinous drainage   Skin:     General: Skin is warm and dry.       Capillary Refill: Capillary refill takes less than 2 seconds.   Neurological:      General: No focal deficit present.      Mental Status: He is alert and oriented to person, place, and time.   Psychiatric:         Mood and Affect: Mood normal.         Behavior: Behavior normal.         Results Review:  I reviewed the patient's new clinical results.  I reviewed the patient's new imaging results and agree with the interpretation.  I reviewed the patient's other test results and agree with the interpretation    Lab Results (last 24 hours)     Procedure Component Value Units Date/Time    POC Glucose Once [[id number]]  (Abnormal) Collected: 01/06/22 0801    Specimen: Blood Updated: 01/06/22 0802     Glucose 135 mg/dL      Comment: Meter: [id number] : [id number] María ALVARADO RN       Tissue Pathology Exam [[id number]] Collected: 01/06/22 1010    Specimen: Tissue from Leg, Left Updated: 01/06/22 1052    POC Glucose Once [[id number]]  (Abnormal) Collected: 01/06/22 1145    Specimen: Blood Updated: 01/06/22 1151     Glucose 147 mg/dL      Comment: Meter: [id number] : [id number] Lm Valenzuela RN       POC Glucose Once [[id number]]  (Abnormal) Collected: 01/06/22 1604    Specimen: Blood Updated: 01/06/22 1606     Glucose 270 mg/dL      Comment: Meter: [id number] : [id number] Arjun ARMIJO             Imaging Results (Last 24 Hours)     ** No results found for the last 24 hours. **              No orders to display        Assessment/Plan     Active Hospital Problems    Diagnosis  POA   • **Osteomyelitis of great toe of left foot (HCC) [M86.9]  Yes   • WEN (generalized anxiety disorder) [F41.1]  Yes   • Type 2 diabetes mellitus with diabetic polyneuropathy, without long-term current use of insulin (HCC) [E11.42]  Yes   • Obstructive sleep apnea syndrome [G47.33]  Yes   • Obesity (BMI 30-39.9) [E66.9]  Yes   • Benign essential hypertension [I10]  Yes   • Hypercholesterolemia [E78.00]  Yes      Resolved Hospital  Problems   No resolved problems to display.   Diabetic Foot Ulcer with Osteomyelitis of the Left Foot  - recurrent issue, now s/p left BKA 1/6/22  - postoperative management per primary team  - encourage incentive spirometry, bowel regimen    Type 2 DM  - complications include peripheral neuropathy and foot ulcers with osteomyelitis  - hold metformin  - cover with ssi/hypoglycemia protocol moderate dose  - he has requested to take his home ozempic when it is due tomorrow and this is fine-I have ordered  - on lyrica for peripheral neuropathy  - check A1C    ZENA  - patient has home CPAP    Hypertension  - BP is low normal  - will hold losartan for now to avoid postoperative hypotension    Hyperlipidemia  - on crestor    I discussed the patient's findings and my recommendations with patient, family and nursing staff.    VTE Prophylaxis - Lovenox 40 mg SC daily.       Edi Lopes MD  [facility]  01/06/22  16:54 EST

## 2022-01-07 LAB
ANION GAP SERPL CALCULATED.3IONS-SCNC: 9 MMOL/L (ref 5–15)
BUN SERPL-MCNC: 13 MG/DL (ref 6–20)
BUN/CREAT SERPL: 10.9 (ref 7–25)
CALCIUM SPEC-SCNC: 8.4 MG/DL (ref 8.6–10.5)
CHLORIDE SERPL-SCNC: 105 MMOL/L (ref 98–107)
CO2 SERPL-SCNC: 25 MMOL/L (ref 22–29)
CREAT SERPL-MCNC: 1.19 MG/DL (ref 0.76–1.27)
GFR SERPL CREATININE-BSD FRML MDRD: 65 ML/MIN/1.73
GLUCOSE BLDC GLUCOMTR-MCNC: 121 MG/DL (ref 70–130)
GLUCOSE BLDC GLUCOMTR-MCNC: 151 MG/DL (ref 70–130)
GLUCOSE BLDC GLUCOMTR-MCNC: 172 MG/DL (ref 70–130)
GLUCOSE BLDC GLUCOMTR-MCNC: 217 MG/DL (ref 70–130)
GLUCOSE SERPL-MCNC: 129 MG/DL (ref 65–99)
HCT VFR BLD AUTO: 32.6 % (ref 37.5–51)
HGB BLD-MCNC: 10.7 G/DL (ref 13–17.7)
POTASSIUM SERPL-SCNC: 3.9 MMOL/L (ref 3.5–5.2)
SODIUM SERPL-SCNC: 139 MMOL/L (ref 136–145)

## 2022-01-07 PROCEDURE — 25010000002 ENOXAPARIN PER 10 MG: Performed by: ORTHOPAEDIC SURGERY

## 2022-01-07 PROCEDURE — 85014 HEMATOCRIT: CPT | Performed by: ORTHOPAEDIC SURGERY

## 2022-01-07 PROCEDURE — 85018 HEMOGLOBIN: CPT | Performed by: ORTHOPAEDIC SURGERY

## 2022-01-07 PROCEDURE — 97162 PT EVAL MOD COMPLEX 30 MIN: CPT

## 2022-01-07 PROCEDURE — 97166 OT EVAL MOD COMPLEX 45 MIN: CPT

## 2022-01-07 PROCEDURE — 63710000001 INSULIN LISPRO (HUMAN) PER 5 UNITS: Performed by: INTERNAL MEDICINE

## 2022-01-07 PROCEDURE — 80048 BASIC METABOLIC PNL TOTAL CA: CPT | Performed by: ORTHOPAEDIC SURGERY

## 2022-01-07 PROCEDURE — G0378 HOSPITAL OBSERVATION PER HR: HCPCS

## 2022-01-07 PROCEDURE — 0 CEFAZOLIN IN DEXTROSE 2-4 GM/100ML-% SOLUTION: Performed by: ORTHOPAEDIC SURGERY

## 2022-01-07 PROCEDURE — 97530 THERAPEUTIC ACTIVITIES: CPT

## 2022-01-07 PROCEDURE — 82962 GLUCOSE BLOOD TEST: CPT

## 2022-01-07 PROCEDURE — 97110 THERAPEUTIC EXERCISES: CPT

## 2022-01-07 PROCEDURE — 25010000002 HYDROMORPHONE PER 4 MG: Performed by: ORTHOPAEDIC SURGERY

## 2022-01-07 RX ORDER — MORPHINE SULFATE 15 MG/1
15 TABLET, FILM COATED, EXTENDED RELEASE ORAL EVERY 12 HOURS SCHEDULED
Status: DISCONTINUED | OUTPATIENT
Start: 2022-01-07 | End: 2022-01-11 | Stop reason: HOSPADM

## 2022-01-07 RX ORDER — HYDROMORPHONE HYDROCHLORIDE 1 MG/ML
0.5 INJECTION, SOLUTION INTRAMUSCULAR; INTRAVENOUS; SUBCUTANEOUS ONCE
Status: COMPLETED | OUTPATIENT
Start: 2022-01-07 | End: 2022-01-07

## 2022-01-07 RX ORDER — OXYCODONE HYDROCHLORIDE 5 MG/1
10 TABLET ORAL EVERY 4 HOURS PRN
Status: DISCONTINUED | OUTPATIENT
Start: 2022-01-07 | End: 2022-01-11 | Stop reason: HOSPADM

## 2022-01-07 RX ORDER — LOSARTAN POTASSIUM 50 MG/1
50 TABLET ORAL
Status: DISCONTINUED | OUTPATIENT
Start: 2022-01-08 | End: 2022-01-11 | Stop reason: HOSPADM

## 2022-01-07 RX ORDER — ACETAMINOPHEN 500 MG
1000 TABLET ORAL EVERY 8 HOURS
Status: DISCONTINUED | OUTPATIENT
Start: 2022-01-07 | End: 2022-01-11 | Stop reason: HOSPADM

## 2022-01-07 RX ORDER — OXYCODONE HYDROCHLORIDE AND ACETAMINOPHEN 5; 325 MG/1; MG/1
1 TABLET ORAL ONCE
Status: COMPLETED | OUTPATIENT
Start: 2022-01-07 | End: 2022-01-07

## 2022-01-07 RX ADMIN — ENOXAPARIN SODIUM 40 MG: 40 INJECTION SUBCUTANEOUS at 01:37

## 2022-01-07 RX ADMIN — ROSUVASTATIN CALCIUM 40 MG: 40 TABLET, FILM COATED ORAL at 08:28

## 2022-01-07 RX ADMIN — CHOLECALCIFEROL TAB 10 MCG (400 UNIT) 400 UNITS: 10 TAB at 08:28

## 2022-01-07 RX ADMIN — SODIUM CHLORIDE 100 ML/HR: 9 INJECTION, SOLUTION INTRAVENOUS at 01:41

## 2022-01-07 RX ADMIN — PREGABALIN 50 MG: 25 CAPSULE ORAL at 08:30

## 2022-01-07 RX ADMIN — LAMOTRIGINE 25 MG: 25 TABLET ORAL at 08:28

## 2022-01-07 RX ADMIN — HYDROMORPHONE HYDROCHLORIDE 0.5 MG: 1 INJECTION, SOLUTION INTRAMUSCULAR; INTRAVENOUS; SUBCUTANEOUS at 01:41

## 2022-01-07 RX ADMIN — HYDROMORPHONE HYDROCHLORIDE 0.5 MG: 1 INJECTION, SOLUTION INTRAMUSCULAR; INTRAVENOUS; SUBCUTANEOUS at 09:11

## 2022-01-07 RX ADMIN — MORPHINE SULFATE 15 MG: 15 TABLET, FILM COATED, EXTENDED RELEASE ORAL at 20:47

## 2022-01-07 RX ADMIN — OXYCODONE HYDROCHLORIDE 10 MG: 5 TABLET ORAL at 21:53

## 2022-01-07 RX ADMIN — FAMOTIDINE 40 MG: 20 TABLET, FILM COATED ORAL at 08:28

## 2022-01-07 RX ADMIN — DOCUSATE SODIUM 50MG AND SENNOSIDES 8.6MG 2 TABLET: 8.6; 5 TABLET, FILM COATED ORAL at 20:47

## 2022-01-07 RX ADMIN — HYDROMORPHONE HYDROCHLORIDE 0.5 MG: 1 INJECTION, SOLUTION INTRAMUSCULAR; INTRAVENOUS; SUBCUTANEOUS at 16:16

## 2022-01-07 RX ADMIN — ACETAMINOPHEN 1000 MG: 500 TABLET ORAL at 11:34

## 2022-01-07 RX ADMIN — OXYCODONE AND ACETAMINOPHEN 1 TABLET: 5; 325 TABLET ORAL at 09:10

## 2022-01-07 RX ADMIN — CEFAZOLIN SODIUM 2 G: 2 INJECTION, SOLUTION INTRAVENOUS at 04:20

## 2022-01-07 RX ADMIN — OXYCODONE HYDROCHLORIDE 10 MG: 5 TABLET ORAL at 17:48

## 2022-01-07 RX ADMIN — OXYCODONE HYDROCHLORIDE 10 MG: 5 TABLET ORAL at 12:55

## 2022-01-07 RX ADMIN — HYDROMORPHONE HYDROCHLORIDE 0.5 MG: 1 INJECTION, SOLUTION INTRAMUSCULAR; INTRAVENOUS; SUBCUTANEOUS at 06:37

## 2022-01-07 RX ADMIN — HYDROMORPHONE HYDROCHLORIDE 0.5 MG: 1 INJECTION, SOLUTION INTRAMUSCULAR; INTRAVENOUS; SUBCUTANEOUS at 11:37

## 2022-01-07 RX ADMIN — SODIUM CHLORIDE 100 ML/HR: 9 INJECTION, SOLUTION INTRAVENOUS at 06:26

## 2022-01-07 RX ADMIN — POLYETHYLENE GLYCOL 3350 17 G: 17 POWDER, FOR SOLUTION ORAL at 17:52

## 2022-01-07 RX ADMIN — LAMOTRIGINE 25 MG: 25 TABLET ORAL at 20:47

## 2022-01-07 RX ADMIN — ENOXAPARIN SODIUM 40 MG: 40 INJECTION SUBCUTANEOUS at 23:52

## 2022-01-07 RX ADMIN — INSULIN LISPRO 2 UNITS: 100 INJECTION, SOLUTION INTRAVENOUS; SUBCUTANEOUS at 17:48

## 2022-01-07 RX ADMIN — ACETAMINOPHEN 1000 MG: 500 TABLET ORAL at 20:47

## 2022-01-07 RX ADMIN — OXYCODONE AND ACETAMINOPHEN 1 TABLET: 5; 325 TABLET ORAL at 04:20

## 2022-01-07 RX ADMIN — OXYCODONE AND ACETAMINOPHEN 1 TABLET: 5; 325 TABLET ORAL at 08:35

## 2022-01-07 RX ADMIN — SODIUM CHLORIDE 100 ML/HR: 9 INJECTION, SOLUTION INTRAVENOUS at 05:07

## 2022-01-07 RX ADMIN — INSULIN LISPRO 4 UNITS: 100 INJECTION, SOLUTION INTRAVENOUS; SUBCUTANEOUS at 11:34

## 2022-01-07 RX ADMIN — SEMAGLUTIDE 0.25 MG: 1.34 INJECTION, SOLUTION SUBCUTANEOUS at 17:49

## 2022-01-07 RX ADMIN — MODAFINIL 100 MG: 100 TABLET ORAL at 11:34

## 2022-01-07 RX ADMIN — DOCUSATE SODIUM 50MG AND SENNOSIDES 8.6MG 2 TABLET: 8.6; 5 TABLET, FILM COATED ORAL at 08:28

## 2022-01-07 NOTE — PLAN OF CARE
Goal Outcome Evaluation:  Plan of Care Reviewed With: patient           Outcome Summary: Pt is a 51 y.o male s/p L BKA. He has hx of R BKA and uses a prothesis. He lives with his spouse and independent with ADLs and mobility. Today pt agreeable to OT, co treat completed with PT. He sat EOB with min AX2. Sitting balance and UE strength are good. He can complete UB ADLs with set up. Total A for LB ADLs and unable to transfer today due to dizziness with sitting EOB and nausea. Pt also with high levels of pain this encounter. He would benefit from continued OT to increase his independence with ADLs and transfers now with new amputation. [facility]    Appropriate PPE worn during encounter including gloves, mask, and eye protection. Hand hygiene completed. Pt did not wear a mask.

## 2022-01-07 NOTE — PROGRESS NOTES
Continued Stay Note  [facility]     Patient Name: Montez Guy  MRN: [id number]  Today's Date: 1/7/2022    Admit Date: 1/6/2022     Discharge Plan     Row Name 01/07/22 1658       Plan    Plan Comments Per Jay/NOÉ they have started precert to d/c to SNF. CCP to follow.               Discharge Codes    No documentation.               Expected Discharge Date and Time     Expected Discharge Date Expected Discharge Time    Alberto 10, 2022             Ginna Castle RN

## 2022-01-07 NOTE — PLAN OF CARE
Goal Outcome Evaluation:              Outcome Summary: Pt is PD #1 of L BKA,. Pt is AOx4. Pt rested well this sihft. Pain managed with PRN pain medication.  Pt voiding per urinal. Pt educated on fall risk. Safety maintained. Plan is to d/c to rehab facillity upon discharge.

## 2022-01-07 NOTE — THERAPY EVALUATION
Patient Name: Montez Guy  : 1970    MRN: [id number]                              Today's Date: 2022       Admit Date: 2022    Visit Dx:     ICD-10-CM ICD-9-CM   1. Osteomyelitis of great toe of left foot (HCC)  M86.9 730.27   2. Open wound of fifth toe of left foot, initial encounter  S91.105A 893.0   3. Open wound of fifth toe of left foot, subsequent encounter  S91.105D V58.89     893.0     Patient Active Problem List   Diagnosis   • Cellulitis   • Pressure ulcer, ankle   • Benign essential hypertension   • Constipation, chronic   • Hypercholesterolemia   • Mood disorder ([facility])   • Obstructive sleep apnea syndrome   • Peyronie's disease   • Type 2 diabetes mellitus with diabetic polyneuropathy, without long-term current use of insulin ([facility])   • Vitamin D deficiency   • Open wound of fifth toe of left foot   • Obesity (BMI 30-39.9)   • Fever   • Streptococcal infection group A   • Pneumonia involving right lung   • Sepsis ([facility])   • Below-knee amputation of right lower extremity ([facility])   • MDD (major depressive disorder), recurrent episode, moderate ([facility])   • WEN (generalized anxiety disorder)   • Osteomyelitis of great toe of left foot ([facility])     Past Medical History:   Diagnosis Date   • Cellulitis of left lower extremity 2020   • Diabetes ([facility])    • History of coma     X 4 MONTHS   • History of weight loss    • Hypertension    • Impaired lung function     LEFT   • MRSA (methicillin resistant staph aureus) culture positive     RIGHT LEG- OSTEOMYELYTIS-SEPSIS-BLOOD LOSS   • Obesity (BMI 30-39.9) 2020   • Obstructive sleep apnea syndrome 2020    CPAP   • Osteomyelitis of great toe of left foot ([facility])    • Pneumonia 2014    necrotizing pneumonia     Past Surgical History:   Procedure Laterality Date   • BELOW KNEE AMPUTATION Left 2022    Procedure: LEFT BELOW KNEE AMPUTATION;  Surgeon: Enrique Jain MD;  Location: [facility];  Service: Orthopedics;  Laterality: Left;   •  "BELOW KNEE LEG AMPUTATION Right    • DEBRIDEMENT LEG Right     MULTIPLE   • FLEXIBLE BRONCHOSCOPY W/ BRONCHOPULMONARY LAVAGE      MULTIPLE   • PILONIDAL CYSTECTOMY     • RHINOPLASTY     • TOE AMPUTATION     • TRACHEOSTOMY     • TRACHEOSTOMY CLOSURE/STOMA REVISION        General Information     Row Name 01/07/22 0919          Physical Therapy Time and Intention    Document Type evaluation  Pt. is s/p Left BKA;  h/o Right BKA  -MS     Mode of Treatment physical therapy; individual therapy  -MS     Row Name 01/07/22 0919          General Information    Patient Profile Reviewed yes  -MS     Prior Level of Function independent:  Use of RLE BKA prosthetic;  No A.D. use per pt. report  -MS     Existing Precautions/Restrictions fall   DONNIE Drain; Exit alarm  -MS     Barriers to Rehab none identified  -MS     Row Name 01/07/22 0919          Cognition    Orientation Status (Cognition) oriented x 3  -MS           User Key  (r) = Recorded By, (t) = Taken By, (c) = Cosigned By    Initials Name Provider Type    Edi Dacosta, PT Physical Therapist               Mobility     Row Name 01/07/22 0920          Bed Mobility    Bed Mobility supine-sit; sit-supine  -MS     Supine-Sit [place] (Bed Mobility) minimum assist (75% patient effort)  -MS     Sit-Supine [place] (Bed Mobility) minimum assist (75% patient effort)  -MS     Comment (Bed Mobility) Once sitting EOB, pt. requires CGA/Min. assist x 1 for static/dynamic sitting balance.  Posterior lean at times with c/o feeling \"dizzy\" and \"light headed\".  BP taken while sitting (134/78)  -MS     Row Name 01/07/22 0920          Transfers    Comment (Transfers) Unable to attempt transfers given pt's fatigue, weakness, and dizziness with upright sitting.  -MS           User Key  (r) = Recorded By, (t) = Taken By, (c) = Cosigned By    Initials Name Provider Type    Edi Dacosta, PT Physical Therapist               Obj/Interventions     Row Name 01/07/22 0921          " Range of Motion Comprehensive    Comment, General Range of Motion BUE (WFL's)  -MS     Row Name 01/07/22 0921          Strength Comprehensive (MMT)    Comment, General Manual Muscle Testing (MMT) Assessment BUE (>/= 3/5)  -MS     Row Name 01/07/22 0921          Motor Skills    Therapeutic Exercise --  Left L.E. ther .ex. program x 5 reps completed with assist (Hip Abduction, SLR's)  -MS           User Key  (r) = Recorded By, (t) = Taken By, (c) = Cosigned By    Initials Name Provider Type    MS AnthonyEdi, PT Physical Therapist               Goals/Plan     Row Name 01/07/22 0923          Bed Mobility Goal 1 (PT)    Activity/Assistive Device (Bed Mobility Goal 1, PT) bed mobility activities, all  -MS     [place] Level/Cues Needed (Bed Mobility Goal 1, PT) contact guard assist  -MS     Time Frame (Bed Mobility Goal 1, PT) long term goal (LTG); 1 week  -MS     Row Name 01/07/22 0923          Transfer Goal 1 (PT)    Activity/Assistive Device (Transfer Goal 1, PT) transfers, all; walker, rolling; sit-to-stand/stand-to-sit; bed-to-chair/chair-to-bed  -MS     [place] Level/Cues Needed (Transfer Goal 1, PT) minimum assist (75% or more patient effort); 2 person assist  -MS     Time Frame (Transfer Goal 1, PT) long term goal (LTG); 1 week  -MS     Row Name 01/07/22 0923          Problem Specific Goal 1 (PT)    Problem Specific Goal 1 (PT) Pt. will be able to sit EOB both static and dynamic with SBA x 1  -MS     Time Frame (Problem Specific Goal 1, PT) long-term goal (LTG); 1 week  -MS           User Key  (r) = Recorded By, (t) = Taken By, (c) = Cosigned By    Initials Name Provider Type    MS AnthonyEdi, PT Physical Therapist               Clinical Impression     Row Name 01/07/22 0922          Pain    Additional Documentation Pain Scale: Numbers Pre/Post-Treatment (Group)  -MS     Row Name 01/07/22 0922          Pain Scale: Numbers Pre/Post-Treatment    Pretreatment Pain Rating 8/10  -MS      Posttreatment Pain Rating 8/10  -MS     Pain Location - Side Left  -MS     Pain Location residual limb  -MS     Pain Intervention(s) Medication (See MAR); Elevated; Nursing Notified; Repositioned  -MS     Row Name 01/07/22 0922          Plan of Care Review    Plan of Care Reviewed With patient  -MS     Row Name 01/07/22 0922          Therapy Assessment/Plan (PT)    Rehab Potential (PT) good, to achieve stated therapy goals  -MS     Criteria for Skilled Interventions Met (PT) skilled treatment is necessary  -MS     Row Name 01/07/22 0922          Positioning and Restraints    Pre-Treatment Position in bed  -MS     Post Treatment Position bed  -MS     In Bed notified nsg; supine; call light within reach; encouraged to call for assist; exit alarm on  All lines intact.  -MS           User Key  (r) = Recorded By, (t) = Taken By, (c) = Cosigned By    Initials Name Provider Type    Edi Dacosta PT Physical Therapist               Outcome Measures     Row Name 01/07/22 0925          How much help from another person do you currently need...    Turning from your back to your side while in flat bed without using bedrails? 3  -MS     Moving from lying on back to sitting on the side of a flat bed without bedrails? 3  -MS     Moving to and from a bed to a chair (including a wheelchair)? 2  -MS     Standing up from a chair using your arms (e.g., wheelchair, bedside chair)? 2  -MS     Climbing 3-5 steps with a railing? 1  -MS     To walk in hospital room? 1  -MS     AM-PAC 6 Clicks Score (PT) 12  -MS     Row Name 01/07/22 0925          Functional Assessment    Outcome Measure Options AM-PAC 6 Clicks Basic Mobility (PT)  -MS           User Key  (r) = Recorded By, (t) = Taken By, (c) = Cosigned By    Initials Name Provider Type    Edi Dacosta PT Physical Therapist                             Physical Therapy Education                 Title: PT OT SLP Therapies (Done)     Topic: Physical Therapy (Done)     Point:  Mobility training (Done)     Learning Progress Summary           Patient Acceptance, E,D, VU,NR by MS at 1/7/2022 0925                   Point: Home exercise program (Done)     Learning Progress Summary           Patient Acceptance, E,D, VU,NR by MS at 1/7/2022 0925                   Point: Body mechanics (Done)     Learning Progress Summary           Patient Acceptance, E,D, VU,NR by MS at 1/7/2022 0925                   Point: Precautions (Done)     Learning Progress Summary           Patient Acceptance, E,D, VU,NR by MS at 1/7/2022 0925                               User Key     Initials Effective Dates Name Provider Type Discipline    MS 06/16/21 -  Edi Anthony, PT Physical Therapist PT              PT Recommendation and Plan  Planned Therapy Interventions (PT): balance training, bed mobility training, home exercise program, patient/family education, postural re-education, transfer training, strengthening  Plan of Care Reviewed With: patient  Outcome Summary: Pt. is a 51 year old Male who is s/p Left BKA and h/o Right BKA.  Pt. reports that prior to admission he was independent with functional mobility and used not A.D. during ambulation. Pt. does use a RLE BKA Prosthetic.  Pt. currently presents with decreased strength, decreased balance, and decreased tolerance to functional activity. Pt. will benefit from skilled inpt. P.T. to address his functional deficits and to assist pt. in regaining his maximum level of independence with functional mobility. Co-Treat with O.T. this date.     Time Calculation:    PT Charges     Row Name 01/07/22 0928             Time Calculation    Start Time 0844  -MS      Stop Time 0904  -MS      Time Calculation (min) 20 min  -MS      PT Received On 01/07/22  -MS      PT - Next Appointment 01/08/22  -MS      PT Goal Re-Cert Due Date 01/14/22  -MS              Time Calculation- PT    Total Timed Code Minutes- PT 19 minute(s)  -MS            User Key  (r) = Recorded By, (t) =  Taken By, (c) = Cosigned By    Initials Name Provider Type    MS Edi Anthony, PT Physical Therapist              Therapy Charges for Today     Code Description Service Date Service Provider Modifiers Qty    09477728165 HC PT EVAL MOD COMPLEXITY 2 1/7/2022 Edi Anthony, PT GP 1    53024138431 HC PT THER PROC EA 15 MIN 1/7/2022 Edi Anthony, PT GP 1          PT G-Codes  Outcome Measure Options: AM-PAC 6 Clicks Basic Mobility (PT)  AM-PAC 6 Clicks Score (PT): 12    Edi Anthony, PT  1/7/2022

## 2022-01-07 NOTE — PROGRESS NOTES
DAILY PROGRESS NOTE  [facility]    Patient Identification:  Name: Montez Guy  Age: 51 y.o.  Sex: male  :  1970  MRN: [id number]         Primary Care Physician: Jeremiah Sanchez MD    Subjective:  Interval History: Some postoperative nausea but that is getting better today.  He is tolerating p.o. intake with no issue.  His breakfast tray is empty.  He is conversational and pleasant.  Does not appear toxic in appearance.  Denies any chest pain shortness of breath confusion    Objective: No family at bedside.      Scheduled Meds:cholecalciferol, 400 Units, Oral, Daily  enoxaparin, 40 mg, Subcutaneous, Q24H  famotidine, 40 mg, Oral, Daily  insulin lispro, 0-9 Units, Subcutaneous, TID AC  lamoTRIgine, 25 mg, Oral, BID  modafinil, 100 mg, Oral, Daily  pregabalin, 25 mg, Oral, Q12H  pregabalin, 50 mg, Oral, Daily  rosuvastatin, 40 mg, Oral, Daily  Semaglutide(0.25 or 0.5MG/DOS), 0.25 mg, Subcutaneous, Weekly  senna-docusate sodium, 2 tablet, Oral, BID      Continuous Infusions:     Vital signs in last 24 hours:  Temp:  [97 °F (36.1 °C)-98.9 °F (37.2 °C)] 97 °F (36.1 °C)  Heart Rate:  [67-81] 70  Resp:  [14-18] 18  BP: (118-131)/(72-92) 125/75    Intake/Output:    Intake/Output Summary (Last 24 hours) at 2022 0829  Last data filed at 2022 0555  Gross per 24 hour   Intake 2556.67 ml   Output 1330 ml   Net 1226.67 ml       Exam:  /75 (BP Location: Left arm, Patient Position: Lying)   Pulse 70   Temp 97 °F (36.1 °C) (Oral)   Resp 18   SpO2 96%     General Appearance:    Alert, cooperative, AAOx3                          Head:    Normocephalic, without obvious abnormality, atraumatic                           Eyes:    Conjunctivae/corneas clear, EOM's intact, both eyes                         Throat:   Oral mucosa pink and moist                           Neck:   Supple, symmetrical, trachea midline, no JVD                         Lungs:    Clear to auscultation bilaterally,  respirations unlabored                 Chest Wall:    No tenderness or deformity                          Heart:    Regular rate and rhythm, S1 and S2 normal                  Abdomen:     Soft, nontender, bowel sounds active                 Extremities: Old right BKA now with left BKA                  Neurologic:   CNII-XII intact, moving all     Data Review:  Labs in chart were reviewed.    Assessment:  Active Hospital Problems    Diagnosis  POA   • **Osteomyelitis of great toe of left foot (HCC) [M86.9]  Yes   • WEN (generalized anxiety disorder) [F41.1]  Yes   • Type 2 diabetes mellitus with diabetic polyneuropathy, without long-term current use of insulin (HCC) [E11.42]  Yes   • Obstructive sleep apnea syndrome [G47.33]  Yes   • Obesity (BMI 30-39.9) [E66.9]  Yes   • Benign essential hypertension [I10]  Yes   • Hypercholesterolemia [E78.00]  Yes      Resolved Hospital Problems   No resolved problems to display.       Plan:    DM2 with circulatory disorder/PVD/PAF/foot ulcer with probable CKD involvement and with subsequent osteomyelitis status post left BKA 1/6/2022 per Dr. Jain   -Metformin on hold   -Sliding scale and okay for home use of Ozempic   -Current -171 with a max of 270    ZENA okay for home use of CPAP    HTN stable -hold ARB another day    HLD on Crestor      [facility] to follow for medical management    Nuno Gallardo MD  1/7/2022  08:29 EST

## 2022-01-07 NOTE — THERAPY EVALUATION
Patient Name: Montez Guy  : 1970    MRN: [id number]                              Today's Date: 2022       Admit Date: 2022    Visit Dx:     ICD-10-CM ICD-9-CM   1. Osteomyelitis of great toe of left foot (HCC)  M86.9 730.27   2. Open wound of fifth toe of left foot, initial encounter  S91.105A 893.0   3. Open wound of fifth toe of left foot, subsequent encounter  S91.105D V58.89     893.0     Patient Active Problem List   Diagnosis   • Cellulitis   • Pressure ulcer, ankle   • Benign essential hypertension   • Constipation, chronic   • Hypercholesterolemia   • Mood disorder ([facility])   • Obstructive sleep apnea syndrome   • Peyronie's disease   • Type 2 diabetes mellitus with diabetic polyneuropathy, without long-term current use of insulin ([facility])   • Vitamin D deficiency   • Open wound of fifth toe of left foot   • Obesity (BMI 30-39.9)   • Fever   • Streptococcal infection group A   • Pneumonia involving right lung   • Sepsis ([facility])   • Below-knee amputation of right lower extremity ([facility])   • MDD (major depressive disorder), recurrent episode, moderate ([facility])   • WEN (generalized anxiety disorder)   • Osteomyelitis of great toe of left foot ([facility])     Past Medical History:   Diagnosis Date   • Cellulitis of left lower extremity 2020   • Diabetes ([facility])    • History of coma     X 4 MONTHS   • History of weight loss    • Hypertension    • Impaired lung function     LEFT   • MRSA (methicillin resistant staph aureus) culture positive     RIGHT LEG- OSTEOMYELYTIS-SEPSIS-BLOOD LOSS   • Obesity (BMI 30-39.9) 2020   • Obstructive sleep apnea syndrome 2020    CPAP   • Osteomyelitis of great toe of left foot ([facility])    • Pneumonia 2014    necrotizing pneumonia     Past Surgical History:   Procedure Laterality Date   • BELOW KNEE AMPUTATION Left 2022    Procedure: LEFT BELOW KNEE AMPUTATION;  Surgeon: Enrique Jain MD;  Location: [facility];  Service: Orthopedics;  Laterality: Left;   •  BELOW KNEE LEG AMPUTATION Right    • DEBRIDEMENT LEG Right     MULTIPLE   • FLEXIBLE BRONCHOSCOPY W/ BRONCHOPULMONARY LAVAGE      MULTIPLE   • PILONIDAL CYSTECTOMY     • RHINOPLASTY     • TOE AMPUTATION     • TRACHEOSTOMY     • TRACHEOSTOMY CLOSURE/STOMA REVISION        General Information     [facility] Name 01/07/22 1156          OT Time and Intention    Document Type evaluation  -     Mode of Treatment occupational therapy; physical therapy; co-treatment  -     Row Name 01/07/22 1156          General Information    Patient Profile Reviewed yes  -     Prior Level of Function independent:; ADL's; all household mobility  hx of R BKA, wears a prothesis. Does not use AD for mobility.  -     Existing Precautions/Restrictions fall  New L BKA  -     Barriers to Rehab none identified  -     Row Name 01/07/22 1156          Occupational Profile    Environmental Supports and Barriers (Occupational Profile) Pt reports he has a shower chair, no other DME uses prior.  -     Row Name 01/07/22 1156          Living Environment    Lives With spouse  -[facility] Name 01/07/22 1156          Home Main Entrance    Number of Stairs, Main Entrance five  -[facility] Name 01/07/22 1156          Stairs Within Home, Primary    Stairs Comment, Within Home, Primary one level for all ADLs.  -     Row Name 01/07/22 1156          Cognition    Orientation Status (Cognition) oriented x 4  -     Row Name 01/07/22 1156          Safety Issues, Functional Mobility    Impairments Affecting Function (Mobility) pain; strength; balance; endurance/activity tolerance  -           User Key  (r) = Recorded By, (t) = Taken By, (c) = Cosigned By    Initials Name Provider Type     Marybeth Rushing OT Occupational Therapist                 Mobility/ADL's     Row Name 01/07/22 1158          Bed Mobility    Supine-Sit Ruby (Bed Mobility) minimum assist (75% patient effort); 2 person assist  -     Sit-Supine [place] (Bed Mobility)  minimum assist (75% patient effort); 2 person assist; verbal cues  -     Row Name 01/07/22 1158          Transfers    Comment (Transfers) Did not complete today, pt very dizzy with EOB sitting, BP taken 134/78. He also feeling nauseous and asking for basin to vomit in however does improve after several minutes. Pt does have prothesis in room to attempt in upcoming sessions.  -     Row Name 01/07/22 1158          Activities of Daily Living    BADL Assessment/Intervention upper body dressing; grooming; toileting  -     Row Name 01/07/22 1158          Upper Body Dressing Assessment/Training    [place] Level (Upper Body Dressing) upper body dressing skills; don; set up  hospital gown  -     Position (Upper Body Dressing) sitting up in bed  -     Row Name 01/07/22 1158          Grooming Assessment/Training    [place] Level (Grooming) grooming skills; set up  -     Position (Grooming) sitting up in bed  -     Row Name 01/07/22 1158          Toileting Assessment/Training    Comment (Toileting) Pt reports he has been using urinal with set up assist, not able to progress to BSC transfer today and he will likely need to use bedpan at current state  -           User Key  (r) = Recorded By, (t) = Taken By, (c) = Cosigned By    Initials Name Provider Type    Marybeth Saxena OT Occupational Therapist               Obj/Interventions     Row Name 01/07/22 1201          Range of Motion Comprehensive    General Range of Motion bilateral upper extremity ROM WFL  -     Row Name 01/07/22 1201          Strength Comprehensive (MMT)    Comment, General Manual Muscle Testing (MMT) Assessment BUE strength 4-/5  -     Row Name 01/07/22 1201          Balance    Balance Assessment sitting static balance; sitting dynamic balance  -     Static Sitting Balance WFL; sitting, edge of bed  -     Dynamic Sitting Balance sitting, edge of bed; mild impairment  -     Balance Interventions sitting; dynamic  reaching  -SM     Comment, Balance 10 reps BUE reaching out of center of balance, does well CGA, sat EOB ~9 minutes  -SM           User Key  (r) = Recorded By, (t) = Taken By, (c) = Cosigned By    Initials Name Provider Type    Marybeth Saxena OT Occupational Therapist               Goals/Plan     Row Name 01/07/22 1207          Transfer Goal 1 (OT)    Activity/Assistive Device (Transfer Goal 1, OT) transfers, all; commode, bedside with drop arms; sit-to-stand/stand-to-sit; shower chair; tub bench  -SM     [place] Level/Cues Needed (Transfer Goal 1, OT) minimum assist (75% or more patient effort)  -SM     Time Frame (Transfer Goal 1, OT) short term goal (STG); 2 weeks  -SM     Progress/Outcome (Transfer Goal 1, OT) goal ongoing  -     Row Name 01/07/22 1207          Bathing Goal 1 (OT)    Activity/Device (Bathing Goal 1, OT) bathing skills, all  -SM     [place] Level/Cues Needed (Bathing Goal 1, OT) minimum assist (75% or more patient effort)  -SM     Time Frame (Bathing Goal 1, OT) short term goal (STG); 2 weeks  -SM     Progress/Outcomes (Bathing Goal 1, OT) goal ongoing  -     Row Name 01/07/22 1207          Dressing Goal 1 (OT)    Activity/Device (Dressing Goal 1, OT) dressing skills, all  -SM     [place]/Cues Needed (Dressing Goal 1, OT) minimum assist (75% or more patient effort)  -SM     Time Frame (Dressing Goal 1, OT) short term goal (STG); 2 weeks  -SM     Progress/Outcome (Dressing Goal 1, OT) goal ongoing  -     Row Name 01/07/22 1207          Toileting Goal 1 (OT)    Activity/Device (Toileting Goal 1, OT) toileting skills, all  -SM     [place] Level/Cues Needed (Toileting Goal 1, OT) minimum assist (75% or more patient effort)  -SM     Time Frame (Toileting Goal 1, OT) short term goal (STG); 2 weeks  -SM     Progress/Outcome (Toileting Goal 1, OT) goal ongoing  -SM           User Key  (r) = Recorded By, (t) = Taken By, (c) = Cosigned By    Initials Name Provider Type      Marybeth Rushing, GUZMAN Occupational Therapist               Clinical Impression     Row Name 01/07/22 1201          Pain Scale: Numbers Pre/Post-Treatment    Pretreatment Pain Rating 8/10  -SM     Posttreatment Pain Rating 9/10  -     Pain Location - Side Left  -     Pain Location residual limb  -     Pain Intervention(s) Medication (See MAR); Ambulation/increased activity  RN provided pain meds prior before session.  -     Row Name 01/07/22 1201          Plan of Care Review    Plan of Care Reviewed With patient  -     Outcome Summary Pt is a 51 y.o male s/p L BKA. He has hx of R BKA and uses a prothesis. He lives with his spouse and independent with ADLs and mobility. Today pt agreeable to OT, co treat completed with PT. He sat EOB with min AX2. Sitting balance and UE strength are good. He can complete UB ADLs with set up. Total A for LB ADLs and unable to transfer today due to dizziness with sitting EOB and nausea. Pt also with high levels of pain this encounter. He would benefit from continued OT to increase his independence with ADLs and transfers now with new amputation. He would be good candidate for acute rehab at [place].  -     Row Name 01/07/22 1201          Therapy Assessment/Plan (OT)    Rehab Potential (OT) good, to achieve stated therapy goals  -     Criteria for Skilled Therapeutic Interventions Met (OT) yes; skilled treatment is necessary  -     Therapy Frequency (OT) 5 times/wk  -     Row Name 01/07/22 1201          Therapy Plan Review/Discharge Plan (OT)    Anticipated Discharge Disposition (OT) inpatient rehabilitation facility  -     Row Name 01/07/22 1201          Positioning and Restraints    Pre-Treatment Position in bed  -     Post Treatment Position bed  -SM     In Bed fowlers; call light within reach; encouraged to call for assist; exit alarm on; notified ns  -           User Key  (r) = Recorded By, (t) = Taken By, (c) = Cosigned By    Initials Name Provider Type      Marybeth Rushing, GUZMAN Occupational Therapist               Outcome Measures     Row Name 01/07/22 1208          How much help from another is currently needed...    Putting on and taking off regular lower body clothing? 1  -SM     Bathing (including washing, rinsing, and drying) 2  -SM     Toileting (which includes using toilet bed pan or urinal) 2  -SM     Putting on and taking off regular upper body clothing 3  -SM     Taking care of personal grooming (such as brushing teeth) 3  -SM     Eating meals 4  -SM     AM-PAC 6 Clicks Score (OT) 15  -SM     Row Name 01/07/22 0925          How much help from another person do you currently need...    Turning from your back to your side while in flat bed without using bedrails? 3  -MS     Moving from lying on back to sitting on the side of a flat bed without bedrails? 3  -MS     Moving to and from a bed to a chair (including a wheelchair)? 2  -MS     Standing up from a chair using your arms (e.g., wheelchair, bedside chair)? 2  -MS     Climbing 3-5 steps with a railing? 1  -MS     To walk in hospital room? 1  -MS     AM-PAC 6 Clicks Score (PT) 12  -MS     Row Name 01/07/22 1208 01/07/22 0925       Functional Assessment    Outcome Measure Options AM-PAC 6 Clicks Daily Activity (OT)  - AM-PAC 6 Clicks Basic Mobility (PT)  -MS          User Key  (r) = Recorded By, (t) = Taken By, (c) = Cosigned By    Initials Name Provider Type    Edi Dacosta, PT Physical Therapist     Marybeth Rushing OT Occupational Therapist                Occupational Therapy Education                 Title: PT OT SLP Therapies (In Progress)     Topic: Occupational Therapy (In Progress)     Point: ADL training (Done)     Description:   Instruct learner(s) on proper safety adaptation and remediation techniques during self care or transfers.   Instruct in proper use of assistive devices.              Learning Progress Summary           Patient Acceptance, E, VU by  at 1/7/2022 6199     Comment: Role of OT and POC/goals.                   Point: Home exercise program (Not Started)     Description:   Instruct learner(s) on appropriate technique for monitoring, assisting and/or progressing therapeutic exercises/activities.              Learner Progress:  Not documented in this visit.          Point: Precautions (Not Started)     Description:   Instruct learner(s) on prescribed precautions during self-care and functional transfers.              Learner Progress:  Not documented in this visit.          Point: Body mechanics (Not Started)     Description:   Instruct learner(s) on proper positioning and spine alignment during self-care, functional mobility activities and/or exercises.              Learner Progress:  Not documented in this visit.                      User Key     Initials Effective Dates Name Provider Type Discipline     04/02/20 -  Marybeth Rushing OT Occupational Therapist OT              OT Recommendation and Plan  Therapy Frequency (OT): 5 times/wk  Plan of Care Review  Plan of Care Reviewed With: patient  Outcome Summary: Pt is a 51 y.o male s/p L BKA. He has hx of R BKA and uses a prothesis. He lives with his spouse and independent with ADLs and mobility. Today pt agreeable to OT, co treat completed with PT. He sat EOB with min AX2. Sitting balance and UE strength are good. He can complete UB ADLs with set up. Total A for LB ADLs and unable to transfer today due to dizziness with sitting EOB and nausea. Pt also with high levels of pain this encounter. He would benefit from continued OT to increase his independence with ADLs and transfers now with new amputation. He would be good candidate for acute rehab at [place].     Time Calculation:    Time Calculation- OT     Row Name 01/07/22 1210             Time Calculation- OT    OT Start Time 0845  -      OT Stop Time 0900  -      OT Time Calculation (min) 15 min  -      Total Timed Code Minutes- OT 10 minute(s)  -      OT Received  On 01/07/22  -      OT - Next Appointment 01/10/22  -              Timed Charges    36998 - OT Therapeutic Activity Minutes 10  -SM              Untimed Charges    OT Eval/Re-eval Minutes 5  -SM              Total Minutes    Timed Charges Total Minutes 10  -SM      Untimed Charges Total Minutes 5  -SM       Total Minutes 15  -SM            User Key  (r) = Recorded By, (t) = Taken By, (c) = Cosigned By    Initials Name Provider Type     Marybeth Rushing OT Occupational Therapist              Therapy Charges for Today     Code Description Service Date Service Provider Modifiers Qty    [id number]  OT EVAL MOD COMPLEXITY 2 1/7/2022 Marybeth Rushing OT GO 1    [id number]  OT THERAPEUTIC ACT EA 15 MIN 1/7/2022 Marybeth Rushing OT GO 1               Marybeth Rushing OT  1/7/2022

## 2022-01-07 NOTE — PROGRESS NOTES
ORTHOPAEDIC/MUSCULOSKELETAL ONCOLOGIC PROGRESS NOTE  [facility]  Enrique Jain M.D.    Patient Identification:  Name: Montez Guy  :  1970  MRN: [id number]        Subjective:   No acute events.  Block wore off earlier this morning.  He had a significant pain requirement at that time.  Has been experiencing some nausea.  No emesis.  Denies any chest pain, shortness of breath,.  Has been up and out of bed with physical therapy.  States that he felt a little fatigued and lightheaded while doing so but overall doing well.      Current Meds:   Current Facility-Administered Medications   Medication Dose Route Frequency Provider Last Rate Last Admin   • acetaminophen (TYLENOL) tablet 1,000 mg  1,000 mg Oral Q8H Enrique Jain MD       • sennosides-docusate (PERICOLACE) 8.6-50 MG per tablet 2 tablet  2 tablet Oral BID Enrique Jain MD   2 tablet at 22    And   • polyethylene glycol (MIRALAX) packet 17 g  17 g Oral Daily PRN Enrique Jain MD        And   • bisacodyl (DULCOLAX) EC tablet 5 mg  5 mg Oral Daily PRN Enrique Jain MD        And   • bisacodyl (DULCOLAX) suppository 10 mg  10 mg Rectal Daily PRN Enrique Jain MD       • budesonide-formoterol (SYMBICORT) 160-4.5 MCG/ACT inhaler 1 puff  1 puff Inhalation BID PRN Enrique Jain MD       • cholecalciferol (VITAMIN D3) tablet 400 Units  400 Units Oral Daily Enrique Jain MD   400 Units at 22   • dextrose (D50W) (25 g/50 mL) IV injection 25 g  25 g Intravenous Q15 Min PRN Edi Lopes MD       • dextrose (GLUTOSE) oral gel 15 g  15 g Oral Q15 Min PRN Edi Lopes MD       • enoxaparin (LOVENOX) syringe 40 mg  40 mg Subcutaneous Q24H Enrique Jain MD   40 mg at 22 0137   • famotidine (PEPCID) tablet 40 mg  40 mg Oral Daily Enrique Jain MD   40 mg at 22   • glucagon (human recombinant) (GLUCAGEN DIAGNOSTIC) injection 1 mg  1 mg Subcutaneous Q15 Min PRN Moses  Edi WYATT MD       • HYDROmorphone (DILAUDID) injection 0.5 mg  0.5 mg Intravenous Q4H PRN Enrique Jain MD   0.5 mg at 22 0637    And   • naloxone (NARCAN) injection 0.4 mg  0.4 mg Intravenous Q5 Min PRN Enrique Jain MD       • insulin lispro (ADMELOG) injection 0-9 Units  0-9 Units Subcutaneous TID AC Edi Lopes MD   6 Units at 22 1714   • lamoTRIgine (LaMICtal) tablet 25 mg  25 mg Oral BID Enrique Jain MD   25 mg at 22 0828   • [START ON 2022] losartan (COZAAR) tablet 50 mg  50 mg Oral Q24H Nuno Gallardo MD       • modafinil (PROVIGIL) tablet 100 mg  100 mg Oral Daily Enrique Jain MD   100 mg at 22 1606   • ondansetron (ZOFRAN) tablet 4 mg  4 mg Oral Q6H PRN Enrique Jain MD        Or   • ondansetron (ZOFRAN) injection 4 mg  4 mg Intravenous Q6H PRN Enrique Jain MD       • oxyCODONE (ROXICODONE) immediate release tablet 10 mg  10 mg Oral Q4H PRN Enrique Jain MD       • oxyCODONE-acetaminophen (PERCOCET) 5-325 MG per tablet 1 tablet  1 tablet Oral Q4H PRN Enrique Jain MD   1 tablet at 22 0835   • pregabalin (LYRICA) capsule 50 mg  50 mg Oral Daily Enrique Jain MD   50 mg at 22 0830   • rosuvastatin (CRESTOR) tablet 40 mg  40 mg Oral Daily Enrique Jain MD   40 mg at 22 0828   • Semaglutide(0.25 or 0.5MG/DOS) (OZEMPIC) solution pen-injector 0.25 mg  0.25 mg Subcutaneous Weekly Edi Lopes MD           Allergies:  No Known Allergies      Objective:  tMax 24 hrs: Temp (24hrs), Av.7 °F (36.5 °C), Min:97 °F (36.1 °C), Max:98.9 °F (37.2 °C)    Vitals Ranges:   Temp:  [97 °F (36.1 °C)-98.9 °F (37.2 °C)] 97 °F (36.1 °C)  Heart Rate:  [67-81] 70  Resp:  [14-18] 18  BP: (118-131)/(72-92) 125/75  Intake and Output Last 3 Shifts:   I/O last 3 completed shifts:  In: 2556.7 [P.O.:840; I.V.:1516.7; IV Piggyback:200]  Out: 1630 [Urine:1630]    Physical Exam:  /75 (BP Location: Left arm, Patient Position: Lying)   Pulse 70    Temp 97 °F (36.1 °C) (Oral)   Resp 18   SpO2 96%     General Appearance:    Alert, cooperative, no distress, appears stated age   HEENT:    Normocephalic, atraumatic. Sclerae anicteric.  Mucous         membranes moist.   Cardio:   Regular rate and rhythm.   Lungs:     Breathing unlabored on room air   Focused MSK:  Examination of the left residual limb demonstrates healing incision no purulence mild bleeding.  The dressing was bled through on the lateral aspect.       Data Review:  Lab Results (last 24 hours)     Procedure Component Value Units Date/Time    Basic Metabolic Panel [[id number]]  (Abnormal) Collected: 01/07/22 0616    Specimen: Blood Updated: 01/07/22 0744     Glucose 129 mg/dL      BUN 13 mg/dL      Creatinine 1.19 mg/dL      Sodium 139 mmol/L      Potassium 3.9 mmol/L      Chloride 105 mmol/L      CO2 25.0 mmol/L      Calcium 8.4 mg/dL      eGFR Non African Amer 65 mL/min/1.73      BUN/Creatinine Ratio 10.9     Anion Gap 9.0 mmol/L     Narrative:      GFR Normal >60  Chronic Kidney Disease <60  Kidney Failure <15      Hemoglobin & Hematocrit, Blood [[id number]]  (Abnormal) Collected: 01/07/22 0616    Specimen: Blood Updated: 01/07/22 0723     Hemoglobin 10.7 g/dL      Hematocrit 32.6 %     POC Glucose Once [[id number]]  (Normal) Collected: 01/07/22 0608    Specimen: Blood Updated: 01/07/22 0609     Glucose 121 mg/dL      Comment: Meter: [id number] : [id number] Juarez Clara NA       POC Glucose Once [[id number]]  (Abnormal) Collected: 01/06/22 2054    Specimen: Blood Updated: 01/06/22 2055     Glucose 171 mg/dL      Comment: Meter: [id number] : [id number] Juarez Clara NA       POC Glucose Once [[id number]]  (Abnormal) Collected: 01/06/22 1604    Specimen: Blood Updated: 01/06/22 1606     Glucose 270 mg/dL      Comment: Meter: [id number] : [id number] Arjun Grace NA       POC Glucose Once [[id number]]  (Abnormal) Collected: 01/06/22 1145    Specimen: Blood Updated: 01/06/22 1151     Glucose 147  mg/dL      Comment: Meter: [id number] : [id number] Lm Valenzuela RN               Assessment:  Montez Guy is a 51 y.o. male who presents with chronic wounds and osteomyelitis of the left great and small toe.  Status post left below-knee amputation, postoperative day 1.    Postoperative issues at this time:   1.   pain  2.  Acute blood loss anemia  3.  Diabetes  4.  Hypertension.      Medical decision-makin.  We will adjust pain medications.  We will discontinue Percocet and add Roxicodone 10 mg every 4 hours as needed, 1000 mg acetaminophen every 8 hours.  Will assess and see how the patient does with this treatment regimen.  Give consideration to MS Contin 15 mg twice daily if the patient still requires more pain medications.  2.  Regarding acute blood loss anemia, patient had low blood loss intraoperatively not factoring in the blood loss from of the amputated limb.  He is currently hemodynamically stable.  Will monitor.  3.  Appreciate the hospitalist input and management of the patient's diabetes and hypertension.  4.  Continue low molecular weight heparin for DVT prophylaxis.  5.  Continue physical therapy while in the hospital nonweightbearing left lower extremity  6.  Ice and elevation to help with swelling and pain  7.  Discharge disposition: Awaiting placement.  Provided the patient's pain is well tolerated with oral medications he may be discharged to rehab as soon as a bed is available.      Enrique Jain M.D.  2022

## 2022-01-07 NOTE — PLAN OF CARE
Goal Outcome Evaluation:  Plan of Care Reviewed With: patient           Outcome Summary: Pt. is a 51 year old Male who is s/p Left BKA and h/o Right BKA.  Pt. reports that prior to admission he was independent with functional mobility and used not A.D. during ambulation. Pt. does use a RLE BKA Prosthetic.  Pt. currently presents with decreased strength, decreased balance, and decreased tolerance to functional activity. Pt. will benefit from skilled inpt. P.T. to address his functional deficits and to assist pt. in regaining his maximum level of independence with functional mobility. Co-Treat with O.T. this date.    Patient was wearing a face mask during this therapy encounter. Therapist used appropriate personal protective equipment including eye protection, mask, and gloves.  Mask used was standard procedure mask. Appropriate PPE was worn during the entire therapy session. Hand hygiene was completed before and after therapy session. Patient is not in enhanced droplet precautions.

## 2022-01-08 LAB
ANION GAP SERPL CALCULATED.3IONS-SCNC: 10.7 MMOL/L (ref 5–15)
BASOPHILS # BLD AUTO: 0.07 10*3/MM3 (ref 0–0.2)
BASOPHILS NFR BLD AUTO: 0.7 % (ref 0–1.5)
BUN SERPL-MCNC: 12 MG/DL (ref 6–20)
BUN/CREAT SERPL: 10.5 (ref 7–25)
CALCIUM SPEC-SCNC: 8.7 MG/DL (ref 8.6–10.5)
CHLORIDE SERPL-SCNC: 101 MMOL/L (ref 98–107)
CO2 SERPL-SCNC: 23.3 MMOL/L (ref 22–29)
CREAT SERPL-MCNC: 1.14 MG/DL (ref 0.76–1.27)
D-LACTATE SERPL-SCNC: 0.5 MMOL/L (ref 0.5–2)
DEPRECATED RDW RBC AUTO: 41.3 FL (ref 37–54)
EOSINOPHIL # BLD AUTO: 0.28 10*3/MM3 (ref 0–0.4)
EOSINOPHIL NFR BLD AUTO: 2.8 % (ref 0.3–6.2)
ERYTHROCYTE [DISTWIDTH] IN BLOOD BY AUTOMATED COUNT: 13.8 % (ref 12.3–15.4)
GFR SERPL CREATININE-BSD FRML MDRD: 68 ML/MIN/1.73
GLUCOSE BLDC GLUCOMTR-MCNC: 136 MG/DL (ref 70–130)
GLUCOSE BLDC GLUCOMTR-MCNC: 154 MG/DL (ref 70–130)
GLUCOSE BLDC GLUCOMTR-MCNC: 156 MG/DL (ref 70–130)
GLUCOSE BLDC GLUCOMTR-MCNC: 163 MG/DL (ref 70–130)
GLUCOSE SERPL-MCNC: 155 MG/DL (ref 65–99)
HCT VFR BLD AUTO: 34.5 % (ref 37.5–51)
HGB BLD-MCNC: 11.6 G/DL (ref 13–17.7)
IMM GRANULOCYTES # BLD AUTO: 0.04 10*3/MM3 (ref 0–0.05)
IMM GRANULOCYTES NFR BLD AUTO: 0.4 % (ref 0–0.5)
LYMPHOCYTES # BLD AUTO: 1.45 10*3/MM3 (ref 0.7–3.1)
LYMPHOCYTES NFR BLD AUTO: 14.3 % (ref 19.6–45.3)
MCH RBC QN AUTO: 27.9 PG (ref 26.6–33)
MCHC RBC AUTO-ENTMCNC: 33.6 G/DL (ref 31.5–35.7)
MCV RBC AUTO: 82.9 FL (ref 79–97)
MONOCYTES # BLD AUTO: 1.12 10*3/MM3 (ref 0.1–0.9)
MONOCYTES NFR BLD AUTO: 11.1 % (ref 5–12)
NEUTROPHILS NFR BLD AUTO: 7.15 10*3/MM3 (ref 1.7–7)
NEUTROPHILS NFR BLD AUTO: 70.7 % (ref 42.7–76)
NRBC BLD AUTO-RTO: 0 /100 WBC (ref 0–0.2)
PLATELET # BLD AUTO: 192 10*3/MM3 (ref 140–450)
PMV BLD AUTO: 9.7 FL (ref 6–12)
POTASSIUM SERPL-SCNC: 3.9 MMOL/L (ref 3.5–5.2)
PROCALCITONIN SERPL-MCNC: 0.16 NG/ML (ref 0–0.25)
RBC # BLD AUTO: 4.16 10*6/MM3 (ref 4.14–5.8)
SODIUM SERPL-SCNC: 135 MMOL/L (ref 136–145)
WBC NRBC COR # BLD: 10.11 10*3/MM3 (ref 3.4–10.8)

## 2022-01-08 PROCEDURE — 97110 THERAPEUTIC EXERCISES: CPT

## 2022-01-08 PROCEDURE — 83605 ASSAY OF LACTIC ACID: CPT | Performed by: NURSE PRACTITIONER

## 2022-01-08 PROCEDURE — 84145 PROCALCITONIN (PCT): CPT | Performed by: NURSE PRACTITIONER

## 2022-01-08 PROCEDURE — 25010000002 HYDROMORPHONE PER 4 MG: Performed by: ORTHOPAEDIC SURGERY

## 2022-01-08 PROCEDURE — 85025 COMPLETE CBC W/AUTO DIFF WBC: CPT | Performed by: NURSE PRACTITIONER

## 2022-01-08 PROCEDURE — 82962 GLUCOSE BLOOD TEST: CPT

## 2022-01-08 PROCEDURE — 87040 BLOOD CULTURE FOR BACTERIA: CPT | Performed by: NURSE PRACTITIONER

## 2022-01-08 PROCEDURE — 80048 BASIC METABOLIC PNL TOTAL CA: CPT | Performed by: NURSE PRACTITIONER

## 2022-01-08 PROCEDURE — 63710000001 INSULIN LISPRO (HUMAN) PER 5 UNITS: Performed by: INTERNAL MEDICINE

## 2022-01-08 PROCEDURE — 25010000002 ONDANSETRON PER 1 MG: Performed by: ORTHOPAEDIC SURGERY

## 2022-01-08 RX ADMIN — MORPHINE SULFATE 15 MG: 15 TABLET, FILM COATED, EXTENDED RELEASE ORAL at 21:32

## 2022-01-08 RX ADMIN — ACETAMINOPHEN 1000 MG: 500 TABLET ORAL at 21:32

## 2022-01-08 RX ADMIN — ONDANSETRON 4 MG: 2 INJECTION INTRAMUSCULAR; INTRAVENOUS at 12:31

## 2022-01-08 RX ADMIN — INSULIN LISPRO 2 UNITS: 100 INJECTION, SOLUTION INTRAVENOUS; SUBCUTANEOUS at 12:31

## 2022-01-08 RX ADMIN — OXYCODONE HYDROCHLORIDE 10 MG: 5 TABLET ORAL at 05:56

## 2022-01-08 RX ADMIN — HYDROMORPHONE HYDROCHLORIDE 0.5 MG: 1 INJECTION, SOLUTION INTRAMUSCULAR; INTRAVENOUS; SUBCUTANEOUS at 12:31

## 2022-01-08 RX ADMIN — LAMOTRIGINE 25 MG: 25 TABLET ORAL at 08:37

## 2022-01-08 RX ADMIN — OXYCODONE HYDROCHLORIDE 10 MG: 5 TABLET ORAL at 18:31

## 2022-01-08 RX ADMIN — DOCUSATE SODIUM 50MG AND SENNOSIDES 8.6MG 2 TABLET: 8.6; 5 TABLET, FILM COATED ORAL at 21:32

## 2022-01-08 RX ADMIN — LOSARTAN POTASSIUM 50 MG: 50 TABLET ORAL at 08:37

## 2022-01-08 RX ADMIN — CHOLECALCIFEROL TAB 10 MCG (400 UNIT) 400 UNITS: 10 TAB at 08:37

## 2022-01-08 RX ADMIN — FAMOTIDINE 40 MG: 20 TABLET, FILM COATED ORAL at 08:37

## 2022-01-08 RX ADMIN — MODAFINIL 100 MG: 100 TABLET ORAL at 08:37

## 2022-01-08 RX ADMIN — DOCUSATE SODIUM 50MG AND SENNOSIDES 8.6MG 2 TABLET: 8.6; 5 TABLET, FILM COATED ORAL at 08:37

## 2022-01-08 RX ADMIN — INSULIN LISPRO 2 UNITS: 100 INJECTION, SOLUTION INTRAVENOUS; SUBCUTANEOUS at 17:01

## 2022-01-08 RX ADMIN — LAMOTRIGINE 25 MG: 25 TABLET ORAL at 21:32

## 2022-01-08 RX ADMIN — MORPHINE SULFATE 15 MG: 15 TABLET, FILM COATED, EXTENDED RELEASE ORAL at 08:37

## 2022-01-08 RX ADMIN — OXYCODONE HYDROCHLORIDE 10 MG: 5 TABLET ORAL at 14:32

## 2022-01-08 RX ADMIN — ONDANSETRON 4 MG: 2 INJECTION INTRAMUSCULAR; INTRAVENOUS at 18:32

## 2022-01-08 RX ADMIN — ACETAMINOPHEN 1000 MG: 500 TABLET ORAL at 14:32

## 2022-01-08 RX ADMIN — ACETAMINOPHEN 1000 MG: 500 TABLET ORAL at 05:56

## 2022-01-08 RX ADMIN — ROSUVASTATIN CALCIUM 40 MG: 40 TABLET, FILM COATED ORAL at 08:37

## 2022-01-08 RX ADMIN — OXYCODONE HYDROCHLORIDE 10 MG: 5 TABLET ORAL at 02:21

## 2022-01-08 RX ADMIN — HYDROMORPHONE HYDROCHLORIDE 0.5 MG: 1 INJECTION, SOLUTION INTRAMUSCULAR; INTRAVENOUS; SUBCUTANEOUS at 17:01

## 2022-01-08 RX ADMIN — HYDROMORPHONE HYDROCHLORIDE 0.5 MG: 1 INJECTION, SOLUTION INTRAMUSCULAR; INTRAVENOUS; SUBCUTANEOUS at 21:25

## 2022-01-08 RX ADMIN — OXYCODONE HYDROCHLORIDE 10 MG: 5 TABLET ORAL at 10:48

## 2022-01-08 RX ADMIN — PREGABALIN 50 MG: 25 CAPSULE ORAL at 08:37

## 2022-01-08 NOTE — PLAN OF CARE
Goal Outcome Evaluation:  Plan of Care Reviewed With: patient        Progress: improving  Outcome Summary: vss, nvi, dressing changed by MD today, c/d/i, stump protector in place, 1 bout of emesis today with ongoing nausea, sat EOB today with PT, awaiting precert for NOÉ in [place], educated on glucose meds and monitoring

## 2022-01-08 NOTE — PROGRESS NOTES
"    DAILY PROGRESS NOTE  [facility]    Patient Identification:  Name: Montez Guy  Age: 51 y.o.  Sex: male  :  1970  MRN: [id number]         Primary Care Physician: Jeremiah Sanchez MD    Subjective:  Interval History: Febrile last night just after dinner and my NP was notified and she ordered sepsis evaluation.  His pro calcitonin lactate and WBC are within normal limits.  He was not trying to rest at the time of the fever so I do not think this is an issue of ZENA compounding things though he is at an increased risk for atelectasis.  He is also on Lovenox for DVT prophylaxis which makes the idea of clotting/DVT much less likely.  He feels much better this morning and is not febrile but he has some nausea without emesis.    Objective: Clinically does not appear toxic.  Conversational and pleasant.  No family at bedside    Scheduled Meds:acetaminophen, 1,000 mg, Oral, Q8H  cholecalciferol, 400 Units, Oral, Daily  enoxaparin, 40 mg, Subcutaneous, Q24H  famotidine, 40 mg, Oral, Daily  insulin lispro, 0-9 Units, Subcutaneous, TID AC  lamoTRIgine, 25 mg, Oral, BID  losartan, 50 mg, Oral, Q24H  modafinil, 100 mg, Oral, Daily  Morphine, 15 mg, Oral, Q12H  pregabalin, 50 mg, Oral, Daily  rosuvastatin, 40 mg, Oral, Daily  Semaglutide(0.25 or 0.5MG/DOS), 0.25 mg, Subcutaneous, Weekly  senna-docusate sodium, 2 tablet, Oral, BID      Continuous Infusions:     Vital signs in last 24 hours:  Temp:  [97.9 °F (36.6 °C)-102.1 °F (38.9 °C)] 98.9 °F (37.2 °C)  Heart Rate:  [] 84  Resp:  [16-18] 16  BP: (131-151)/(79-84) 131/84    Intake/Output:    Intake/Output Summary (Last 24 hours) at 2022 0820  Last data filed at 2022 0700  Gross per 24 hour   Intake 600 ml   Output 2750 ml   Net -2150 ml       Exam:  /84 (BP Location: Left arm, Patient Position: Lying)   Pulse 84   Temp 98.9 °F (37.2 °C) (Oral)   Resp 16   Ht 182.9 cm (72\")   Wt 117 kg (258 lb)   SpO2 94%   BMI 34.99 kg/m² "     General Appearance:    Alert, cooperative, AAOx3                          Head:    Normocephalic, without obvious abnormality, atraumatic                           Eyes:    Conjunctivae/corneas clear, EOM's intact, both eyes                         Throat:   Oral mucosa pink and moist                           Neck:   Supple, no meningismus or JVD                         Lungs:    Mild decrease at bases otherwise clear to auscultation bilaterally, respirations unlabored                 Chest Wall:    No tenderness or deformity                          Heart:    Regular rate and rhythm, S1 and S2 normal                  Abdomen:     Soft, nontender, bowel sounds active                 Extremities: Left lower extremity remains wrapped                 Neurologic:   CNII-XII intact, moving all     Data Review:  Labs in chart were reviewed.    Assessment:  Active Hospital Problems    Diagnosis  POA   • **Osteomyelitis of great toe of left foot (HCC) [M86.9]  Yes   • WEN (generalized anxiety disorder) [F41.1]  Yes   • Type 2 diabetes mellitus with diabetic polyneuropathy, without long-term current use of insulin (HCC) [E11.42]  Yes   • Obstructive sleep apnea syndrome [G47.33]  Yes   • Obesity (BMI 30-39.9) [E66.9]  Yes   • Benign essential hypertension [I10]  Yes   • Hypercholesterolemia [E78.00]  Yes      Resolved Hospital Problems   No resolved problems to display.       Plan:    DM2 with circulatory disorder/PVD/PAF/foot ulcer with probable CKD involvement and with subsequent osteomyelitis status post left BKA 1/6/2022 per Dr. Jain              -Metformin on hold              -Sliding scale and okay for home use of Ozempic            Fever overnight to a T-max of 102.1    -I would suggest empiric antibiotics given past history of MRSA and would suggest vancomycin and Zosyn for additional coverage secondary to the diabetes.   -He is admitted to the surgical team and will defer management of antibiotics to them  since they are managing the osteomyelitis surgically but I would also be happy to empirically dose.  May need to consider ID consultation for chronic antibiotic guidance   -Do not see need for chest x-ray and UA at this time   -Postoperative acute blood loss mild with Hgb at 11.6     ZENA okay for home use of CPAP     HTN stable -okay for losartan     HLD on Crestor        [facility] to follow for medical management    Nuno Gallardo MD  1/8/2022  08:20 EST

## 2022-01-08 NOTE — PLAN OF CARE
Goal Outcome Evaluation:  Plan of Care Reviewed With: patient        Progress: improving  Outcome Summary: Patient stable throughout the shift. POD 1 of L BKA. VSS and voiding function is intact. Patient with moderate to severe pain throughout day, med regimen adjusted, oxy IR, MS contin, and scheduled tylenol added. Patient worked with PT to get to EOB. Dressing changed and DONNIE drain removed per Dr. Jain on rounds. Most likely rehab at d/c.

## 2022-01-08 NOTE — THERAPY TREATMENT NOTE
Patient Name: Montez Guy  : 1970    MRN: [id number]                              Today's Date: 2022       Admit Date: 2022    Visit Dx:     ICD-10-CM ICD-9-CM   1. Osteomyelitis of great toe of left foot (HCC)  M86.9 730.27   2. Open wound of fifth toe of left foot, initial encounter  S91.105A 893.0   3. Open wound of fifth toe of left foot, subsequent encounter  S91.105D V58.89     893.0     Patient Active Problem List   Diagnosis   • Cellulitis   • Pressure ulcer, ankle   • Benign essential hypertension   • Constipation, chronic   • Hypercholesterolemia   • Mood disorder ([facility])   • Obstructive sleep apnea syndrome   • Peyronie's disease   • Type 2 diabetes mellitus with diabetic polyneuropathy, without long-term current use of insulin ([facility])   • Vitamin D deficiency   • Open wound of fifth toe of left foot   • Obesity (BMI 30-39.9)   • Fever   • Streptococcal infection group A   • Pneumonia involving right lung   • Sepsis ([facility])   • Below-knee amputation of right lower extremity ([facility])   • MDD (major depressive disorder), recurrent episode, moderate ([facility])   • WEN (generalized anxiety disorder)   • Osteomyelitis of great toe of left foot ([facility])     Past Medical History:   Diagnosis Date   • Cellulitis of left lower extremity 2020   • Diabetes ([facility])    • History of coma     X 4 MONTHS   • History of weight loss    • Hypertension    • Impaired lung function     LEFT   • MRSA (methicillin resistant staph aureus) culture positive     RIGHT LEG- OSTEOMYELYTIS-SEPSIS-BLOOD LOSS   • Obesity (BMI 30-39.9) 2020   • Obstructive sleep apnea syndrome 2020    CPAP   • Osteomyelitis of great toe of left foot ([facility])    • Pneumonia 2014    necrotizing pneumonia     Past Surgical History:   Procedure Laterality Date   • BELOW KNEE AMPUTATION Left 2022    Procedure: LEFT BELOW KNEE AMPUTATION;  Surgeon: Enrique Jain MD;  Location: [facility];  Service: Orthopedics;  Laterality: Left;   •  BELOW KNEE LEG AMPUTATION Right    • DEBRIDEMENT LEG Right     MULTIPLE   • FLEXIBLE BRONCHOSCOPY W/ BRONCHOPULMONARY LAVAGE      MULTIPLE   • PILONIDAL CYSTECTOMY     • RHINOPLASTY     • TOE AMPUTATION     • TRACHEOSTOMY     • TRACHEOSTOMY CLOSURE/STOMA REVISION        General Information     Row Name 01/08/22 1513          Physical Therapy Time and Intention    Document Type therapy note (daily note)  -     Mode of Treatment physical therapy; individual therapy  -     Row Name 01/08/22 1513          General Information    Patient Profile Reviewed yes  -     Existing Precautions/Restrictions fall; brace worn when out of bed  new L BKA  -     Row Name 01/08/22 1513          Cognition    Orientation Status (Cognition) oriented x 4  -     Row Name 01/08/22 1513          Safety Issues, Functional Mobility    Impairments Affecting Function (Mobility) pain; strength; balance; endurance/activity tolerance  -           User Key  (r) = Recorded By, (t) = Taken By, (c) = Cosigned By    Initials Name Provider Type     Lynette Hinson, PT Physical Therapist               Mobility     Row Name 01/08/22 1522 01/08/22 1514       Bed Mobility    Bed Mobility scooting/bridging; supine-sit; sit-supine  - supine-sit; sit-supine  -KH    Scooting/Bridging [place] (Bed Mobility) modified independence  support under E  -KH --    Supine-Sit [place] (Bed Mobility) --  LLE  -KH minimum assist (75% patient effort); verbal cues; nonverbal cues (demo/gesture)  support LLE  -KH    Sit-Supine [place] (Bed Mobility) --  LLE  -KH minimum assist (75% patient effort); verbal cues; nonverbal cues (demo/gesture)  support E  -KH    Assistive Device (Bed Mobility) -- bed rails; head of bed elevated  -    Row Name 01/08/22 1514          Transfers    Comment (Transfers) Did not attempt this date. Pt very dizzy and lightheaded with sitting EOB exercises and vomited upon lying back down.  -           User Key  (r) =  Recorded By, (t) = Taken By, (c) = Cosigned By    Initials Name Provider Type    Lynette Boyd, FENG Physical Therapist               Obj/Interventions     [facility] Name 01/08/22 1519          Motor Skills    Therapeutic Exercise --  10 reps B hip flexion, hip abduction/adduction, and GS. support provided to LLE.  -[facility] Name 01/08/22 1519          Balance    Balance Assessment sitting static balance; sitting dynamic balance  -     Static Sitting Balance WFL; sitting, edge of bed  -     Dynamic Sitting Balance mild impairment; sitting, edge of bed  use of LUE on railing for balance; SBA-CGA  -           User Key  (r) = Recorded By, (t) = Taken By, (c) = Cosigned By    Initials Name Provider Type    Lynette Boyd, FENG Physical Therapist               Goals/Plan    No documentation.                Clinical Impression     [facility] Name 01/08/22 1523          Pain    Additional Documentation Pain Scale: Numbers Pre/Post-Treatment (Group)  -KH     Row Name 01/08/22 1526          Pain Scale: Numbers Pre/Post-Treatment    Pretreatment Pain Rating 7/10  -     Posttreatment Pain Rating 7/10  -     Pain Location - Side Left  -     Pain Location residual limb  -     Pre/Posttreatment Pain Comment pt tolerated session. Pt vomited at end of session but reports he has been feeling nauseas all day and thinks it is a combination of whatever caused that + pain.  -     Pain Intervention(s) Medication (See MAR); Repositioned  -[facility] Name 01/08/22 1522          Plan of Care Review    Plan of Care Reviewed With patient  -     Outcome Summary Pt is very motivated to work with PT. Pt performed bed mobility and LE exercises. Pt c/o feeling lightheaded/dizzy and increased pain with activity. Pt tolerated sesion but vomited upon return to bed-nsg notified. Pt may benefit from continued skilled PT.  -[facility] Name 01/08/22 1522          Vital Signs    O2 Delivery Pre Treatment room air  -     O2 Delivery Intra  Treatment room air  -     O2 Delivery Post Treatment room air  -     Row Name 01/08/22 1523          Positioning and Restraints    Pre-Treatment Position in bed  -KH     Post Treatment Position bed  -KH     In Bed supine; side lying left; call light within reach; encouraged to call for assist; exit alarm on; with family/caregiver; with nsg  -           User Key  (r) = Recorded By, (t) = Taken By, (c) = Cosigned By    Initials Name Provider Type    Lynette Boyd, FENG Physical Therapist               Outcome Measures     Row Name 01/08/22 1531          How much help from another person do you currently need...    Turning from your back to your side while in flat bed without using bedrails? 3  -KH     Moving from lying on back to sitting on the side of a flat bed without bedrails? 3  -KH     Moving to and from a bed to a chair (including a wheelchair)? 2  -KH     Standing up from a chair using your arms (e.g., wheelchair, bedside chair)? 2  -KH     Climbing 3-5 steps with a railing? 1  -KH     To walk in hospital room? 1  -KH     AM-PAC 6 Clicks Score (PT) 12  -     Row Name 01/08/22 1531          Functional Assessment    Outcome Measure Options AM-PAC 6 Clicks Basic Mobility (PT)  -           User Key  (r) = Recorded By, (t) = Taken By, (c) = Cosigned By    Initials Name Provider Type    Lynette Boyd, PT Physical Therapist                             Physical Therapy Education                 Title: PT OT SLP Therapies (In Progress)     Topic: Physical Therapy (Done)     Point: Mobility training (Done)     Learning Progress Summary           Patient Acceptance, E, VU by LUIS M at 1/8/2022 1532    Acceptance, E,D, VU,NR by MS at 1/7/2022 0925                   Point: Home exercise program (Done)     Learning Progress Summary           Patient Acceptance, E, VU by LUIS M at 1/8/2022 1532    Acceptance, E,D, VU,NR by MS at 1/7/2022 0925                   Point: Body mechanics (Done)     Learning Progress  Summary           Patient Acceptance, E, VU by  at 1/8/2022 1532    Acceptance, E,D, VU,NR by MS at 1/7/2022 0925                   Point: Precautions (Done)     Learning Progress Summary           Patient Acceptance, E, VU by  at 1/8/2022 1532    Acceptance, E,D, VU,NR by MS at 1/7/2022 0925                               User Key     Initials Effective Dates Name Provider Type Discipline     06/16/21 -  Lynette Hinson, FENG Physical Therapist PT    MS 06/16/21 -  Edi Anthony PT Physical Therapist PT              PT Recommendation and Plan     Plan of Care Reviewed With: patient  Outcome Summary: Pt is very motivated to work with PT. Pt performed bed mobility and LE exercises. Pt c/o feeling lightheaded/dizzy and increased pain with activity. Pt tolerated sesion but vomited upon return to bed-nsg notified. Pt may benefit from continued skilled PT.     Time Calculation:    PT Charges     Row Name 01/08/22 1535             Time Calculation    Start Time 1414  -      Stop Time 1430  -      Time Calculation (min) 16 min  -      PT Received On 01/08/22  -      PT - Next Appointment 01/09/22  -            User Key  (r) = Recorded By, (t) = Taken By, (c) = Cosigned By    Initials Name Provider Type     Lynette Hinson, PT Physical Therapist              Therapy Charges for Today     Code Description Service Date Service Provider Modifiers Qty    50432437511 HC PT THER PROC EA 15 MIN 1/8/2022 Lynette Hinson, PT GP 1    47302036151 HC PT THER SUPP EA 15 MIN 1/8/2022 Lynette Hinson, PT GP 1          PT G-Codes  Outcome Measure Options: AM-PAC 6 Clicks Basic Mobility (PT)  AM-PAC 6 Clicks Score (PT): 12  AM-PAC 6 Clicks Score (OT): 15    Lynette Hinson PT  1/8/2022

## 2022-01-08 NOTE — PLAN OF CARE
Goal Outcome Evaluation:  Plan of Care Reviewed With: patient        Progress: improving  Outcome Summary: Pt remains stable. Working with PT/OT. Stump protector to L stump, dressing is cdi. Pain better controlled with changes in regimen, no IV dilaudid required this shift. Voiding per urinal. Pts elevated temp is improving, [facility] ordered additional labs and blood cxs. Educated on diabetes management. D/C plan is SNF when bed available. WCTM.

## 2022-01-08 NOTE — PLAN OF CARE
Goal Outcome Evaluation:  Plan of Care Reviewed With: patient           Outcome Summary: Pt is very motivated to work with PT. Pt performed bed mobility and LE exercises. Pt c/o feeling lightheaded/dizzy and increased pain with activity. Pt tolerated sesion but vomited upon return to bed-nsg notified. Pt may benefit from continued skilled PT.    Patient was not wearing a face mask during this therapy encounter. Therapist used appropriate personal protective equipment including eye protection, mask, and gloves.  Mask used was standard procedure mask. Appropriate PPE was worn during the entire therapy session. Hand hygiene was completed before and after therapy session. Patient is not in enhanced droplet precautions.

## 2022-01-08 NOTE — PROGRESS NOTES
ORTHOPAEDIC/MUSCULOSKELETAL ONCOLOGIC PROGRESS NOTE  [facility]  Enrique Jain M.D.    Patient Identification:  Name: Montez Guy  :  1970  MRN: [id number]        Subjective:   Patient is doing well this morning.  States that overnight he had a febrile episode.  Currently his pain is better controlled than yesterday still having an IV requirement.  Still experiencing some nausea no emesis.  Normal bladder function.  No BM yet this morning.      Current Meds:   Current Facility-Administered Medications   Medication Dose Route Frequency Provider Last Rate Last Admin   • acetaminophen (TYLENOL) tablet 1,000 mg  1,000 mg Oral Q8H Enrique Jain MD   1,000 mg at 22 0556   • sennosides-docusate (PERICOLACE) 8.6-50 MG per tablet 2 tablet  2 tablet Oral BID Enrique Jain MD   2 tablet at 22 0837    And   • polyethylene glycol (MIRALAX) packet 17 g  17 g Oral Daily PRN Enrique Jain MD   17 g at 22 1752    And   • bisacodyl (DULCOLAX) EC tablet 5 mg  5 mg Oral Daily PRN Enrique Jain MD        And   • bisacodyl (DULCOLAX) suppository 10 mg  10 mg Rectal Daily PRN Enrique Jain MD       • budesonide-formoterol (SYMBICORT) 160-4.5 MCG/ACT inhaler 1 puff  1 puff Inhalation BID PRN Enrique Jain MD       • cholecalciferol (VITAMIN D3) tablet 400 Units  400 Units Oral Daily Enrique Jain MD   400 Units at 22 0837   • dextrose (D50W) (25 g/50 mL) IV injection 25 g  25 g Intravenous Q15 Min PRN Edi Lopes MD       • dextrose (GLUTOSE) oral gel 15 g  15 g Oral Q15 Min PRN Edi Lopes MD       • enoxaparin (LOVENOX) syringe 40 mg  40 mg Subcutaneous Q24H Enrique Jain MD   40 mg at 22 2352   • famotidine (PEPCID) tablet 40 mg  40 mg Oral Daily Enrique Jain MD   40 mg at 22 0837   • glucagon (human recombinant) (GLUCAGEN DIAGNOSTIC) injection 1 mg  1 mg Subcutaneous Q15 Min Edi Holt MD       • HYDROmorphone  (DILAUDID) injection 0.5 mg  0.5 mg Intravenous Q4H PRN Enrique Jain MD   0.5 mg at 22 1616    And   • naloxone (NARCAN) injection 0.4 mg  0.4 mg Intravenous Q5 Min PRN Enrique Jain MD       • insulin lispro (ADMELOG) injection 0-9 Units  0-9 Units Subcutaneous TID AC Edi Lopes MD   2 Units at 22 1748   • lamoTRIgine (LaMICtal) tablet 25 mg  25 mg Oral BID Enrique Jain MD   25 mg at 22 0837   • losartan (COZAAR) tablet 50 mg  50 mg Oral Q24H Nuno Gallardo MD   50 mg at 22 0837   • modafinil (PROVIGIL) tablet 100 mg  100 mg Oral Daily Enrique Jain MD   100 mg at 22 08   • Morphine (MS CONTIN) 12 hr tablet 15 mg  15 mg Oral Q12H Enrique Jain MD   15 mg at 22 0837   • ondansetron (ZOFRAN) tablet 4 mg  4 mg Oral Q6H PRN Enrique Jain MD        Or   • ondansetron (ZOFRAN) injection 4 mg  4 mg Intravenous Q6H PRN Enrique Jain MD       • oxyCODONE (ROXICODONE) immediate release tablet 10 mg  10 mg Oral Q4H PRN Enrique Jain MD   10 mg at 22 1048   • oxyCODONE-acetaminophen (PERCOCET) 5-325 MG per tablet 1 tablet  1 tablet Oral Q4H PRN Enrique Jain MD   1 tablet at 22 0835   • pregabalin (LYRICA) capsule 50 mg  50 mg Oral Daily Enrique Jain MD   50 mg at 22 0837   • rosuvastatin (CRESTOR) tablet 40 mg  40 mg Oral Daily Enrique Jain MD   40 mg at 22 0837   • Semaglutide(0.25 or 0.5MG/DOS) (OZEMPIC) solution pen-injector 0.25 mg  0.25 mg Subcutaneous Weekly Edi Lopes MD   0.25 mg at 22 1749       Allergies:  No Known Allergies      Objective:  tMax 24 hrs: Temp (24hrs), Av °F (37.8 °C), Min:97.9 °F (36.6 °C), Max:102.1 °F (38.9 °C)    Vitals Ranges:   Temp:  [97.9 °F (36.6 °C)-102.1 °F (38.9 °C)] 98.7 °F (37.1 °C)  Heart Rate:  [] 80  Resp:  [16-18] 16  BP: (131-151)/(79-88) 138/88  Intake and Output Last 3 Shifts:   I/O last 3 completed shifts:  In: 1200 [P.O.:1200]  Out: 4380  "[Urine:4380]    Physical Exam:  /88 (BP Location: Left arm, Patient Position: Lying)   Pulse 80   Temp 98.7 °F (37.1 °C) (Oral)   Resp 16   Ht 182.9 cm (72\")   Wt 117 kg (258 lb)   SpO2 96%   BMI 34.99 kg/m²     General Appearance:    Alert, cooperative, no distress, appears stated age   HEENT:    Normocephalic, atraumatic. Sclerae anicteric.  Mucous         membranes moist.   Lungs:     Breathing unlabored on room air   Focused MSK:  Examination of the left lower extremity demonstrates an incision without signs of infection.       Data Review:  Lab Results (last 24 hours)     Procedure Component Value Units Date/Time    POC Glucose Once [[id number]]  (Abnormal) Collected: 01/08/22 1039    Specimen: Blood Updated: 01/08/22 1040     Glucose 156 mg/dL      Comment: Meter: [id number] : [id number] Niall ARMIJO       POC Glucose Once [[id number]]  (Abnormal) Collected: 01/08/22 0600    Specimen: Blood Updated: 01/08/22 0602     Glucose 136 mg/dL      Comment: Meter: [id number] : [id number] Eduardo ARMIJO       Basic Metabolic Panel [[id number]]  (Abnormal) Collected: 01/08/22 0222    Specimen: Blood Updated: 01/08/22 0307     Glucose 155 mg/dL      BUN 12 mg/dL      Creatinine 1.14 mg/dL      Sodium 135 mmol/L      Potassium 3.9 mmol/L      Chloride 101 mmol/L      CO2 23.3 mmol/L      Calcium 8.7 mg/dL      eGFR Non African Amer 68 mL/min/1.73      BUN/Creatinine Ratio 10.5     Anion Gap 10.7 mmol/L     Narrative:      GFR Normal >60  Chronic Kidney Disease <60  Kidney Failure <15      Procalcitonin [[id number]]  (Normal) Collected: 01/08/22 0222    Specimen: Blood Updated: 01/08/22 0254     Procalcitonin 0.16 ng/mL     Narrative:      As a Marker for Sepsis (Non-Neonates):     1. <0.5 ng/mL represents a low risk of severe sepsis and/or septic shock.  2. >2 ng/mL represents a high risk of severe sepsis and/or septic shock.    As a Marker for Lower Respiratory Tract Infections that require " "antibiotic therapy:  PCT on Admission     Antibiotic Therapy             6-12 Hrs later  >0.5                          Strongly Recommended            >0.25 - <0.5             Recommended  0.1 - 0.25                  Discouraged                       Remeasure/reassess PCT  <0.1                         Strongly Discouraged         Remeasure/reassess PCT      As 28 day mortality risk marker: \"Change in Procalcitonin Result\" (>80% or <=80%) if Day 0 (or Day 1) and Day 4 values are available. Refer to http://www."[employer]"[facility] Studiosp[employer]-calculator.[email or web address]/    Change in PCT <=80 %   A decrease of PCT levels below or equal to 80% defines a positive change in PCT test result representing a higher risk for 28-day all-cause mortality of patients diagnosed with severe sepsis or septic shock.    Change in PCT >80 %   A decrease of PCT levels of more than 80% defines a negative change in PCT result representing a lower risk for 28-day all-cause mortality of patients diagnosed with severe sepsis or septic shock.                Lactic Acid, Plasma [[id number]]  (Normal) Collected: 01/08/22 0222    Specimen: Blood Updated: 01/08/22 0250     Lactate 0.5 mmol/L     CBC & Differential [[id number]]  (Abnormal) Collected: 01/08/22 0222    Specimen: Blood Updated: 01/08/22 0230    Narrative:      The following orders were created for panel order CBC & Differential.  Procedure                               Abnormality         Status                     ---------                               -----------         ------                     CBC Auto Differential[[id number]]        Abnormal            Final result                 Please view results for these tests on the individual orders.    CBC Auto Differential [[id number]]  (Abnormal) Collected: 01/08/22 0222    Specimen: Blood Updated: 01/08/22 0230     WBC 10.11 10*3/mm3      RBC 4.16 10*6/mm3      Hemoglobin 11.6 g/dL      Hematocrit 34.5 %      MCV 82.9 fL      MCH 27.9 pg      MCHC 33.6 g/dL      RDW " 13.8 %      RDW-SD 41.3 fl      MPV 9.7 fL      Platelets 192 10*3/mm3      Neutrophil % 70.7 %      Lymphocyte % 14.3 %      Monocyte % 11.1 %      Eosinophil % 2.8 %      Basophil % 0.7 %      Immature Grans % 0.4 %      Neutrophils, Absolute 7.15 10*3/mm3      Lymphocytes, Absolute 1.45 10*3/mm3      Monocytes, Absolute 1.12 10*3/mm3      Eosinophils, Absolute 0.28 10*3/mm3      Basophils, Absolute 0.07 10*3/mm3      Immature Grans, Absolute 0.04 10*3/mm3      nRBC 0.0 /100 WBC     Blood Culture - Blood, Arm, Left [[id number]] Collected: 22    Specimen: Blood from Arm, Left Updated: 22    POC Glucose Once [[id number]]  (Abnormal) Collected: 22    Specimen: Blood Updated: 22     Glucose 151 mg/dL      Comment: Meter: [id number] : [id number] Eduardo ARMIJO       POC Glucose Once [[id number]]  (Abnormal) Collected: 22    Specimen: Blood Updated: 22     Glucose 172 mg/dL      Comment: Meter: [id number] : [id number] Arjun ARMIJO               Assessment:  Montez Guy is a 51 y.o. male who presents with diabetic neuropathy, chronic wounds involving the left great toe and small toe with osteomyelitis.  Patient is postoperative day 2 status post below-knee amputation.  Current problems on hospital day #3 include:  1.  Fever  2.  Pain  3.  Acute blood loss anemia  4.  Diabetes  5.  Hypertension    Medical decision-makin.  Regarding the patient's fever: I feel this is related to atelectasis.  I encouraged the patient's to use his incentive spirometer more frequently and if tolerated to sit up and mobilize with assistance to the chair.  If febrile once more, would recommend blood cultures.  Will consider antibiotics and infectious disease consult if he spikes a temperature again  2.  As far as his pain, will continue with his current regimen with a plan to increase his MS Contin to 30 mg twice daily.  3.  Recent CBC demonstrates an  increase in the patient's hematocrit.  Patient remains hemodynamically stable.  There is no need for transfusion at this time  4.  DVT prophylaxis continue low molecular weight heparin  5.  Diabetes and hypertension appreciate the hospitalist involvement in management of this and other medical comorbidities.  6.  Continue physical therapy, nonweightbearing left lower extremity  7.  Ice and elevation to help with pain and swelling  8.  Awaiting placement    Enrique Jain M.D.  1/8/2022

## 2022-01-09 LAB
25(OH)D3 SERPL-MCNC: 34 NG/ML (ref 30–100)
ANION GAP SERPL CALCULATED.3IONS-SCNC: 11.7 MMOL/L (ref 5–15)
BASOPHILS # BLD AUTO: 0.07 10*3/MM3 (ref 0–0.2)
BASOPHILS NFR BLD AUTO: 0.6 % (ref 0–1.5)
BUN SERPL-MCNC: 13 MG/DL (ref 6–20)
BUN/CREAT SERPL: 12.5 (ref 7–25)
CALCIUM SPEC-SCNC: 9.3 MG/DL (ref 8.6–10.5)
CHLORIDE SERPL-SCNC: 101 MMOL/L (ref 98–107)
CO2 SERPL-SCNC: 26.3 MMOL/L (ref 22–29)
CREAT SERPL-MCNC: 1.04 MG/DL (ref 0.76–1.27)
DEPRECATED RDW RBC AUTO: 43.9 FL (ref 37–54)
EOSINOPHIL # BLD AUTO: 0.44 10*3/MM3 (ref 0–0.4)
EOSINOPHIL NFR BLD AUTO: 3.8 % (ref 0.3–6.2)
ERYTHROCYTE [DISTWIDTH] IN BLOOD BY AUTOMATED COUNT: 14.2 % (ref 12.3–15.4)
GFR SERPL CREATININE-BSD FRML MDRD: 76 ML/MIN/1.73
GLUCOSE BLDC GLUCOMTR-MCNC: 111 MG/DL (ref 70–130)
GLUCOSE BLDC GLUCOMTR-MCNC: 140 MG/DL (ref 70–130)
GLUCOSE BLDC GLUCOMTR-MCNC: 168 MG/DL (ref 70–130)
GLUCOSE BLDC GLUCOMTR-MCNC: 270 MG/DL (ref 70–130)
GLUCOSE SERPL-MCNC: 117 MG/DL (ref 65–99)
HCT VFR BLD AUTO: 36.1 % (ref 37.5–51)
HGB BLD-MCNC: 11.9 G/DL (ref 13–17.7)
IMM GRANULOCYTES # BLD AUTO: 0.05 10*3/MM3 (ref 0–0.05)
IMM GRANULOCYTES NFR BLD AUTO: 0.4 % (ref 0–0.5)
LYMPHOCYTES # BLD AUTO: 1.99 10*3/MM3 (ref 0.7–3.1)
LYMPHOCYTES NFR BLD AUTO: 17.4 % (ref 19.6–45.3)
MCH RBC QN AUTO: 28.1 PG (ref 26.6–33)
MCHC RBC AUTO-ENTMCNC: 33 G/DL (ref 31.5–35.7)
MCV RBC AUTO: 85.1 FL (ref 79–97)
MONOCYTES # BLD AUTO: 1.36 10*3/MM3 (ref 0.1–0.9)
MONOCYTES NFR BLD AUTO: 11.9 % (ref 5–12)
NEUTROPHILS NFR BLD AUTO: 65.9 % (ref 42.7–76)
NEUTROPHILS NFR BLD AUTO: 7.53 10*3/MM3 (ref 1.7–7)
NRBC BLD AUTO-RTO: 0 /100 WBC (ref 0–0.2)
PLATELET # BLD AUTO: 206 10*3/MM3 (ref 140–450)
PMV BLD AUTO: 9.8 FL (ref 6–12)
POTASSIUM SERPL-SCNC: 4.4 MMOL/L (ref 3.5–5.2)
RBC # BLD AUTO: 4.24 10*6/MM3 (ref 4.14–5.8)
SODIUM SERPL-SCNC: 139 MMOL/L (ref 136–145)
WBC NRBC COR # BLD: 11.44 10*3/MM3 (ref 3.4–10.8)

## 2022-01-09 PROCEDURE — 25010000002 ONDANSETRON PER 1 MG: Performed by: ORTHOPAEDIC SURGERY

## 2022-01-09 PROCEDURE — 80048 BASIC METABOLIC PNL TOTAL CA: CPT | Performed by: ORTHOPAEDIC SURGERY

## 2022-01-09 PROCEDURE — 82306 VITAMIN D 25 HYDROXY: CPT | Performed by: ORTHOPAEDIC SURGERY

## 2022-01-09 PROCEDURE — 25010000002 HYDROMORPHONE PER 4 MG: Performed by: ORTHOPAEDIC SURGERY

## 2022-01-09 PROCEDURE — 97110 THERAPEUTIC EXERCISES: CPT

## 2022-01-09 PROCEDURE — 63710000001 INSULIN LISPRO (HUMAN) PER 5 UNITS: Performed by: INTERNAL MEDICINE

## 2022-01-09 PROCEDURE — 25010000002 ENOXAPARIN PER 10 MG: Performed by: ORTHOPAEDIC SURGERY

## 2022-01-09 PROCEDURE — 85025 COMPLETE CBC W/AUTO DIFF WBC: CPT | Performed by: ORTHOPAEDIC SURGERY

## 2022-01-09 PROCEDURE — 82962 GLUCOSE BLOOD TEST: CPT

## 2022-01-09 RX ADMIN — ACETAMINOPHEN 1000 MG: 500 TABLET ORAL at 22:42

## 2022-01-09 RX ADMIN — ACETAMINOPHEN 1000 MG: 500 TABLET ORAL at 06:25

## 2022-01-09 RX ADMIN — ENOXAPARIN SODIUM 40 MG: 40 INJECTION SUBCUTANEOUS at 01:01

## 2022-01-09 RX ADMIN — FAMOTIDINE 40 MG: 20 TABLET, FILM COATED ORAL at 08:22

## 2022-01-09 RX ADMIN — ROSUVASTATIN CALCIUM 40 MG: 40 TABLET, FILM COATED ORAL at 08:22

## 2022-01-09 RX ADMIN — OXYCODONE HYDROCHLORIDE 10 MG: 5 TABLET ORAL at 14:20

## 2022-01-09 RX ADMIN — ONDANSETRON 4 MG: 2 INJECTION INTRAMUSCULAR; INTRAVENOUS at 20:19

## 2022-01-09 RX ADMIN — BISACODYL 10 MG: 10 SUPPOSITORY RECTAL at 16:08

## 2022-01-09 RX ADMIN — DOCUSATE SODIUM 50MG AND SENNOSIDES 8.6MG 2 TABLET: 8.6; 5 TABLET, FILM COATED ORAL at 22:40

## 2022-01-09 RX ADMIN — DOCUSATE SODIUM 50MG AND SENNOSIDES 8.6MG 2 TABLET: 8.6; 5 TABLET, FILM COATED ORAL at 08:22

## 2022-01-09 RX ADMIN — BISACODYL 5 MG: 5 TABLET ORAL at 08:22

## 2022-01-09 RX ADMIN — ONDANSETRON 4 MG: 2 INJECTION INTRAMUSCULAR; INTRAVENOUS at 09:42

## 2022-01-09 RX ADMIN — MORPHINE SULFATE 15 MG: 15 TABLET, FILM COATED, EXTENDED RELEASE ORAL at 08:22

## 2022-01-09 RX ADMIN — MORPHINE SULFATE 15 MG: 15 TABLET, FILM COATED, EXTENDED RELEASE ORAL at 22:40

## 2022-01-09 RX ADMIN — HYDROMORPHONE HYDROCHLORIDE 0.5 MG: 1 INJECTION, SOLUTION INTRAMUSCULAR; INTRAVENOUS; SUBCUTANEOUS at 03:56

## 2022-01-09 RX ADMIN — INSULIN LISPRO 2 UNITS: 100 INJECTION, SOLUTION INTRAVENOUS; SUBCUTANEOUS at 12:27

## 2022-01-09 RX ADMIN — ENOXAPARIN SODIUM 40 MG: 40 INJECTION SUBCUTANEOUS at 23:34

## 2022-01-09 RX ADMIN — PREGABALIN 50 MG: 25 CAPSULE ORAL at 08:22

## 2022-01-09 RX ADMIN — LAMOTRIGINE 25 MG: 25 TABLET ORAL at 08:22

## 2022-01-09 RX ADMIN — ACETAMINOPHEN 1000 MG: 500 TABLET ORAL at 14:20

## 2022-01-09 RX ADMIN — LAMOTRIGINE 25 MG: 25 TABLET ORAL at 22:40

## 2022-01-09 RX ADMIN — OXYCODONE HYDROCHLORIDE 10 MG: 5 TABLET ORAL at 06:28

## 2022-01-09 RX ADMIN — MODAFINIL 100 MG: 100 TABLET ORAL at 08:22

## 2022-01-09 RX ADMIN — LOSARTAN POTASSIUM 50 MG: 50 TABLET ORAL at 08:22

## 2022-01-09 RX ADMIN — CHOLECALCIFEROL TAB 10 MCG (400 UNIT) 400 UNITS: 10 TAB at 08:22

## 2022-01-09 RX ADMIN — OXYCODONE HYDROCHLORIDE 10 MG: 5 TABLET ORAL at 22:30

## 2022-01-09 RX ADMIN — ONDANSETRON 4 MG: 2 INJECTION INTRAMUSCULAR; INTRAVENOUS at 03:56

## 2022-01-09 RX ADMIN — OXYCODONE HYDROCHLORIDE 10 MG: 5 TABLET ORAL at 18:00

## 2022-01-09 RX ADMIN — HYDROMORPHONE HYDROCHLORIDE 0.5 MG: 1 INJECTION, SOLUTION INTRAMUSCULAR; INTRAVENOUS; SUBCUTANEOUS at 11:48

## 2022-01-09 NOTE — PROGRESS NOTES
ORTHOPAEDIC/MUSCULOSKELETAL ONCOLOGIC PROGRESS NOTE  [facility]  Enrique Jain M.D.    Patient Identification:  Name: Montez Guy  :  1970  MRN: [id number]        Subjective:   Emesis this am.  Otherwise TERESE.  Denies CP/SOB.  No BM      Current Meds:   Current Facility-Administered Medications   Medication Dose Route Frequency Provider Last Rate Last Admin   • acetaminophen (TYLENOL) tablet 1,000 mg  1,000 mg Oral Q8H Enrique Jain MD   1,000 mg at 22   • sennosides-docusate (PERICOLACE) 8.6-50 MG per tablet 2 tablet  2 tablet Oral BID Enrique Jain MD   2 tablet at 22    And   • polyethylene glycol (MIRALAX) packet 17 g  17 g Oral Daily PRN Enrique Jain MD   17 g at 22 175    And   • bisacodyl (DULCOLAX) EC tablet 5 mg  5 mg Oral Daily PRN Enrique Jain MD   5 mg at 22    And   • bisacodyl (DULCOLAX) suppository 10 mg  10 mg Rectal Daily PRN Enrique Jain MD       • budesonide-formoterol (SYMBICORT) 160-4.5 MCG/ACT inhaler 1 puff  1 puff Inhalation BID PRN Enrique Jain MD       • cholecalciferol (VITAMIN D3) tablet 400 Units  400 Units Oral Daily Enrique Jain MD   400 Units at 22   • dextrose (D50W) (25 g/50 mL) IV injection 25 g  25 g Intravenous Q15 Min PRN Edi Lopes MD       • dextrose (GLUTOSE) oral gel 15 g  15 g Oral Q15 Min PRN Edi Lopes MD       • enoxaparin (LOVENOX) syringe 40 mg  40 mg Subcutaneous Q24H Enrique Jain MD   40 mg at 22 010   • famotidine (PEPCID) tablet 40 mg  40 mg Oral Daily Enrique Jain MD   40 mg at 22   • glucagon (human recombinant) (GLUCAGEN DIAGNOSTIC) injection 1 mg  1 mg Subcutaneous Q15 Min PRN Edi Lopes MD       • HYDROmorphone (DILAUDID) injection 0.5 mg  0.5 mg Intravenous Q4H PRN Enrique Jain MD   0.5 mg at 22 1148    And   • naloxone (NARCAN) injection 0.4 mg  0.4 mg Intravenous Q5 Min PRN Enrique Jian MD  "      • insulin lispro (ADMELOG) injection 0-9 Units  0-9 Units Subcutaneous TID AC Edi Lopes MD   2 Units at 22 1701   • lamoTRIgine (LaMICtal) tablet 25 mg  25 mg Oral BID Enrique Jain MD   25 mg at 22   • losartan (COZAAR) tablet 50 mg  50 mg Oral Q24H Nuno Gallardo MD   50 mg at 22   • modafinil (PROVIGIL) tablet 100 mg  100 mg Oral Daily Enrique Jain MD   100 mg at 22   • Morphine (MS CONTIN) 12 hr tablet 15 mg  15 mg Oral Q12H Enrique Jain MD   15 mg at 22   • ondansetron (ZOFRAN) tablet 4 mg  4 mg Oral Q6H PRN Enrique Jain MD        Or   • ondansetron (ZOFRAN) injection 4 mg  4 mg Intravenous Q6H PRN Enrique Jain MD   4 mg at 22 0942   • oxyCODONE (ROXICODONE) immediate release tablet 10 mg  10 mg Oral Q4H PRN Enrique Jain MD   10 mg at 22 0628   • oxyCODONE-acetaminophen (PERCOCET) 5-325 MG per tablet 1 tablet  1 tablet Oral Q4H PRN Enrique Jain MD   1 tablet at 22 0835   • pregabalin (LYRICA) capsule 50 mg  50 mg Oral Daily Enrique Jain MD   50 mg at 22   • rosuvastatin (CRESTOR) tablet 40 mg  40 mg Oral Daily Enrique Jain MD   40 mg at 22   • Semaglutide(0.25 or 0.5MG/DOS) (OZEMPIC) solution pen-injector 0.25 mg  0.25 mg Subcutaneous Weekly Edi Lopes MD   0.25 mg at 22 174       Allergies:  No Known Allergies      Objective:  tMax 24 hrs: Temp (24hrs), Av.6 °F (36.4 °C), Min:96.9 °F (36.1 °C), Max:98.3 °F (36.8 °C)    Vitals Ranges:   Temp:  [96.9 °F (36.1 °C)-98.3 °F (36.8 °C)] 97.7 °F (36.5 °C)  Heart Rate:  [72-85] 82  Resp:  [15-16] 15  BP: (125-139)/(78-89) 138/89  Intake and Output Last 3 Shifts:   I/O last 3 completed shifts:  In: 1320 [P.O.:1320]  Out: 4085 [Urine:4085]    Physical Exam:  /89 (BP Location: Left arm, Patient Position: Lying)   Pulse 82   Temp 97.7 °F (36.5 °C) (Skin)   Resp 15   Ht 182.9 cm (72\")   Wt 117 kg (258 lb)   " SpO2 96%   BMI 34.99 kg/m²     General Appearance:    Alert, cooperative, no distress, appears stated age   HEENT:    Normocephalic, atraumatic. Sclerae anicteric.  Mucous         membranes moist.   Lungs:     Breathing unlabored on room air   Abdomen:     Soft, nontender, nondistended.   Focused MSK:   LLE incision without SOI       Data Review:  Lab Results (last 24 hours)     Procedure Component Value Units Date/Time    POC Glucose Once [[id number]]  (Abnormal) Collected: 01/09/22 1107    Specimen: Blood Updated: 01/09/22 1109     Glucose 168 mg/dL      Comment: Meter: [id number] : [id number] Mo Alaniz RN       POC Glucose Once [[id number]]  (Normal) Collected: 01/09/22 0607    Specimen: Blood Updated: 01/09/22 0608     Glucose 111 mg/dL      Comment: Meter: [id number] : [id number] Larry ARMIJO       Vitamin D 25 Hydroxy [[id number]]  (Normal) Collected: 01/09/22 0333    Specimen: Blood Updated: 01/09/22 0448     25 Hydroxy, Vitamin D 34.0 ng/ml     Narrative:      Reference Range for Total Vitamin D 25(OH)     Deficiency <20.0 ng/mL   Insufficiency 21-29 ng/mL   Sufficiency  ng/mL  Toxicity >100 ng/ml    Results may be falsely increased if patient taking Biotin.      Basic Metabolic Panel [[id number]]  (Abnormal) Collected: 01/09/22 0333    Specimen: Blood Updated: 01/09/22 0434     Glucose 117 mg/dL      BUN 13 mg/dL      Creatinine 1.04 mg/dL      Sodium 139 mmol/L      Potassium 4.4 mmol/L      Chloride 101 mmol/L      CO2 26.3 mmol/L      Calcium 9.3 mg/dL      eGFR Non African Amer 76 mL/min/1.73      BUN/Creatinine Ratio 12.5     Anion Gap 11.7 mmol/L     Narrative:      GFR Normal >60  Chronic Kidney Disease <60  Kidney Failure <15      CBC & Differential [[id number]]  (Abnormal) Collected: 01/09/22 0333    Specimen: Blood Updated: 01/09/22 0400    Narrative:      The following orders were created for panel order CBC & Differential.  Procedure                               Abnormality          Status                     ---------                               -----------         ------                     CBC Auto Differential[[id number]]        Abnormal            Final result                 Please view results for these tests on the individual orders.    CBC Auto Differential [[id number]]  (Abnormal) Collected: 22 0333    Specimen: Blood Updated: 22 0409     WBC 11.44 10*3/mm3      RBC 4.24 10*6/mm3      Hemoglobin 11.9 g/dL      Hematocrit 36.1 %      MCV 85.1 fL      MCH 28.1 pg      MCHC 33.0 g/dL      RDW 14.2 %      RDW-SD 43.9 fl      MPV 9.8 fL      Platelets 206 10*3/mm3      Neutrophil % 65.9 %      Lymphocyte % 17.4 %      Monocyte % 11.9 %      Eosinophil % 3.8 %      Basophil % 0.6 %      Immature Grans % 0.4 %      Neutrophils, Absolute 7.53 10*3/mm3      Lymphocytes, Absolute 1.99 10*3/mm3      Monocytes, Absolute 1.36 10*3/mm3      Eosinophils, Absolute 0.44 10*3/mm3      Basophils, Absolute 0.07 10*3/mm3      Immature Grans, Absolute 0.05 10*3/mm3      nRBC 0.0 /100 WBC     Blood Culture - Blood, Arm, Left [[id number]]  (Normal) Collected: 22 0221    Specimen: Blood from Arm, Left Updated: 22 0230     Blood Culture No growth at 24 hours    POC Glucose Once [[id number]]  (Abnormal) Collected: 22 2140    Specimen: Blood Updated: 22 2142     Glucose 163 mg/dL      Comment: Meter: [id number] : [id number] Benja ARMIJO       POC Glucose Once [[id number]]  (Abnormal) Collected: 22 1556    Specimen: Blood Updated: 22 1558     Glucose 154 mg/dL      Comment: Meter: [id number] : [id number] Frantz ARMIJO               Assessment:  Montez Guy is a 51 y.o. male POD#3 left below-knee amputation.  Patient overall is doing well.  Emesis this morning.  Has been afebrile for 24 hours.    Current problems on hospital day #4 include:  1.  Pain  2.  Diabetes  3.  Hypertension  4.  No BM      Medical decision-makin.  We will discontinue  Dilaudid otherwise continue current oral pain regimen  2.  Continue with current medical management of diabetes and hypertension.  I appreciate the input of the hospitalist team  3.  Aggressive bowel regimen  4.  Dressing changed this morning  5.  Continue low molecular weight heparin for DVT prophylaxis  6.  Follow blood cultures  7.  Continue physical and occupational therapy, nonweightbearing left lower extremity  8.  Check a.m. TOÑA Jain M.D.  1/9/2022

## 2022-01-09 NOTE — PLAN OF CARE
Goal Outcome Evaluation:  Plan of Care Reviewed With: patient        Progress: improving  Outcome Summary: vss, nvi, dressing c/d/i-changed by Dr Jain today, nausea improving, pain improving, hopeful to d/c to rehab tomorrow awaiting precert, suppository given without results, educated on glucose meds and monitoring

## 2022-01-09 NOTE — PROGRESS NOTES
"    DAILY PROGRESS NOTE  [facility]    Patient Identification:  Name: Montez Guy  Age: 51 y.o.  Sex: male  :  1970  MRN: [id number]         Primary Care Physician: Jeremiah Sanchez MD    Subjective:  Interval History: Notes some nausea and intermittent emesis mainly associated with pain med management.  Patient is otherwise opiate naïve and has been titrated up on MS Contin scheduled realizing Dilaudid and oxycodone on a as needed basis    Objective: Conversational and pleasant.  Does not appear overwhelmingly toxic.    Scheduled Meds:acetaminophen, 1,000 mg, Oral, Q8H  cholecalciferol, 400 Units, Oral, Daily  enoxaparin, 40 mg, Subcutaneous, Q24H  famotidine, 40 mg, Oral, Daily  insulin lispro, 0-9 Units, Subcutaneous, TID AC  lamoTRIgine, 25 mg, Oral, BID  losartan, 50 mg, Oral, Q24H  modafinil, 100 mg, Oral, Daily  Morphine, 15 mg, Oral, Q12H  pregabalin, 50 mg, Oral, Daily  rosuvastatin, 40 mg, Oral, Daily  Semaglutide(0.25 or 0.5MG/DOS), 0.25 mg, Subcutaneous, Weekly  senna-docusate sodium, 2 tablet, Oral, BID      Continuous Infusions:     Vital signs in last 24 hours:  Temp:  [96.9 °F (36.1 °C)-98.7 °F (37.1 °C)] 97.7 °F (36.5 °C)  Heart Rate:  [72-85] 82  Resp:  [15-16] 15  BP: (125-139)/(78-89) 138/89    Intake/Output:    Intake/Output Summary (Last 24 hours) at 2022 0943  Last data filed at 2022 0738  Gross per 24 hour   Intake 960 ml   Output 2560 ml   Net -1600 ml       Exam:  /89 (BP Location: Left arm, Patient Position: Lying)   Pulse 82   Temp 97.7 °F (36.5 °C) (Skin)   Resp 15   Ht 182.9 cm (72\")   Wt 117 kg (258 lb)   SpO2 96%   BMI 34.99 kg/m²     General Appearance:    Alert, cooperative, no distress, AAOx3, wearing CPAP upon entering room                         Throat:   Oral mucosa pink and moist                           Neck:   No JVD                         Lungs:    Decreased at bases otherwise clear to auscultation bilaterally, respirations " unlabored                 Chest Wall:    No tenderness or deformity                          Heart:    Regular rate and rhythm, S1 and S2 normal                  Abdomen:     Soft, nontender, bowel sounds active, no masses, no organomegaly                  Extremities:   Left lower extremity/stump surgically wrapped    Data Review:  Labs in chart were reviewed.    Assessment:  Active Hospital Problems    Diagnosis  POA   • **Osteomyelitis of great toe of left foot (HCC) [M86.9]  Yes   • WEN (generalized anxiety disorder) [F41.1]  Yes   • Type 2 diabetes mellitus with diabetic polyneuropathy, without long-term current use of insulin (HCC) [E11.42]  Yes   • Obstructive sleep apnea syndrome [G47.33]  Yes   • Obesity (BMI 30-39.9) [E66.9]  Yes   • Benign essential hypertension [I10]  Yes   • Hypercholesterolemia [E78.00]  Yes      Resolved Hospital Problems   No resolved problems to display.       Plan:    DM2 with circulatory disorder/PVD/PAF/foot ulcer with probable CKD involvement and with subsequent osteomyelitis status post left BKA 1/6/2022 per Dr. Jain              -Metformin on hold -some GI aversion secondary to narcotics/Oxy              -Sliding scale and okay for home use of Ozempic            No further fever noted.  Further pain-medical management of osteomyelitis per orthopedics.       ZENA okay for home use of CPAP     HTN stable     HLD on Crestor        LHA to follow a bit more peripherally while inpatient -disposition per orthopedics    Nuno Gallardo MD  1/9/2022  09:43 EST

## 2022-01-09 NOTE — THERAPY TREATMENT NOTE
Patient Name: Montez Guy  : 1970    MRN: [id number]                              Today's Date: 2022       Admit Date: 2022    Visit Dx:     ICD-10-CM ICD-9-CM   1. Osteomyelitis of great toe of left foot (HCC)  M86.9 730.27   2. Open wound of fifth toe of left foot, initial encounter  S91.105A 893.0   3. Open wound of fifth toe of left foot, subsequent encounter  S91.105D V58.89     893.0     Patient Active Problem List   Diagnosis   • Cellulitis   • Pressure ulcer, ankle   • Benign essential hypertension   • Constipation, chronic   • Hypercholesterolemia   • Mood disorder ([facility])   • Obstructive sleep apnea syndrome   • Peyronie's disease   • Type 2 diabetes mellitus with diabetic polyneuropathy, without long-term current use of insulin ([facility])   • Vitamin D deficiency   • Open wound of fifth toe of left foot   • Obesity (BMI 30-39.9)   • Fever   • Streptococcal infection group A   • Pneumonia involving right lung   • Sepsis ([facility])   • Below-knee amputation of right lower extremity ([facility])   • MDD (major depressive disorder), recurrent episode, moderate ([facility])   • WEN (generalized anxiety disorder)   • Osteomyelitis of great toe of left foot ([facility])     Past Medical History:   Diagnosis Date   • Cellulitis of left lower extremity 2020   • Diabetes ([facility])    • History of coma     X 4 MONTHS   • History of weight loss    • Hypertension    • Impaired lung function     LEFT   • MRSA (methicillin resistant staph aureus) culture positive     RIGHT LEG- OSTEOMYELYTIS-SEPSIS-BLOOD LOSS   • Obesity (BMI 30-39.9) 2020   • Obstructive sleep apnea syndrome 2020    CPAP   • Osteomyelitis of great toe of left foot ([facility])    • Pneumonia 2014    necrotizing pneumonia     Past Surgical History:   Procedure Laterality Date   • BELOW KNEE AMPUTATION Left 2022    Procedure: LEFT BELOW KNEE AMPUTATION;  Surgeon: Enrique Jain MD;  Location: [facility];  Service: Orthopedics;  Laterality: Left;   •  BELOW KNEE LEG AMPUTATION Right    • DEBRIDEMENT LEG Right     MULTIPLE   • FLEXIBLE BRONCHOSCOPY W/ BRONCHOPULMONARY LAVAGE      MULTIPLE   • PILONIDAL CYSTECTOMY     • RHINOPLASTY     • TOE AMPUTATION     • TRACHEOSTOMY     • TRACHEOSTOMY CLOSURE/STOMA REVISION        General Information     Row Name 01/09/22 1312          Physical Therapy Time and Intention    Document Type therapy note (daily note)  -     Mode of Treatment physical therapy; individual therapy  -     Row Name 01/09/22 1312          General Information    Patient Profile Reviewed yes  -     Existing Precautions/Restrictions fall; brace worn when out of bed  new L BKA  -     Row Name 01/09/22 1312          Cognition    Orientation Status (Cognition) oriented x 4  -     Row Name 01/09/22 1312          Safety Issues, Functional Mobility    Impairments Affecting Function (Mobility) pain; strength; balance; endurance/activity tolerance  -           User Key  (r) = Recorded By, (t) = Taken By, (c) = Cosigned By    Initials Name Provider Type    Lynette Boyd, PT Physical Therapist               Mobility     Row Name 01/09/22 1313          Bed Mobility    Bed Mobility scooting/bridging; supine-sit; sit-supine  -     Scooting/Bridging [place] (Bed Mobility) modified independence  support under E  -     Supine-Sit [place] (Bed Mobility) contact guard; minimum assist (75% patient effort)  E  -     Sit-Supine [place] (Bed Mobility) contact guard; minimum assist (75% patient effort)  E  -     Assistive Device (Bed Mobility) bed rails; head of bed elevated  -     Row Name 01/09/22 1313          Transfers    Comment (Transfers) Did not attempt this date. Pt continues to have nausea and vomiting today and lighteheadedness/dizziness with sitting EOB/activity  -           User Key  (r) = Recorded By, (t) = Taken By, (c) = Cosigned By    Initials Name Provider Type    Lynette Boyd, FENG Physical Therapist                Obj/Interventions     [facility] Name 01/09/22 1314          Motor Skills    Therapeutic Exercise --  10 reps B hip flexion, hip abduction/adduction, and GS. support provided to LLE. 5 reps depression lifts sitting EOB  -[facility] Name 01/09/22 1314          Balance    Balance Assessment sitting static balance; sitting dynamic balance  -     Static Sitting Balance WFL; sitting, edge of bed  -     Dynamic Sitting Balance sitting, edge of bed; supported; mild impairment  SBA with use of bed rail  -           User Key  (r) = Recorded By, (t) = Taken By, (c) = Cosigned By    Initials Name Provider Type     Lynette Hinson, PT Physical Therapist               Goals/Plan    No documentation.                Clinical Impression     [facility] Name 01/09/22 1316          Pain    Additional Documentation Pain Scale: Numbers Pre/Post-Treatment (Group)  -[facility]     Row Name 01/09/22 1316          Pain Scale: Numbers Pre/Post-Treatment    Pretreatment Pain Rating 6/10  -     Posttreatment Pain Rating 6/10  -     Pain Location - Side Left  -     Pain Location residual limb  -     Pre/Posttreatment Pain Comment pt tolerated session. . Pt vomited at end of session again secondary to ongoing nausea and pain  -     Pain Intervention(s) Medication (See MAR); Repositioned; Ambulation/increased activity  -[facility] Name 01/09/22 1321 01/09/22 1320       Plan of Care Review    Outcome Summary Pt continues to be very motivated to work with PT. Pt performed bed mobility and LE exercises sitting EOB with SBA-min A. He required less rest breaks during exercises and demonstrated increased AROM with LLE. Pt continues to have N&V limiting his activity. Pt will benefit from continued skilled PT.  - Pt performed bed mobility and LE exercises sitting EOB with SBA-min A. He required less rest breaks during exercises and demonstrated increased AROM with LLE. Pt continues to have N&V limiting his activity. Pt will benefit from  continued skilled PT.  -    Row Name 01/09/22 1316          Plan of Care Review    Plan of Care Reviewed With patient  -     Progress improving  -     Outcome Summary Pt performed bed mobility and LE exercises sitting EOB with SBA-CGA. He required less rest breaks during exercises and demonstrated increased AROM with LLE. Pt continues to have N&V limiting his activity. Pt will benefit from continued skilled PT.  -     Row Name 01/09/22 1316          Vital Signs    O2 Delivery Pre Treatment room air  -     O2 Delivery Intra Treatment room air  -     O2 Delivery Post Treatment room air  -     Row Name 01/09/22 1316          Positioning and Restraints    Pre-Treatment Position in bed  -     Post Treatment Position bed  -     In Bed supine; call light within reach; encouraged to call for assist; with family/caregiver; with nsg  -           User Key  (r) = Recorded By, (t) = Taken By, (c) = Cosigned By    Initials Name Provider Type    Lynette Boyd, PT Physical Therapist               Outcome Measures     Row Name 01/09/22 1320          How much help from another person do you currently need...    Turning from your back to your side while in flat bed without using bedrails? 3  -KH     Moving from lying on back to sitting on the side of a flat bed without bedrails? 3  -KH     Moving to and from a bed to a chair (including a wheelchair)? 2  -KH     Standing up from a chair using your arms (e.g., wheelchair, bedside chair)? 2  -KH     Climbing 3-5 steps with a railing? 1  -KH     To walk in hospital room? 1  -     AM-PAC 6 Clicks Score (PT) 12  -[facility] Name 01/09/22 1320          Functional Assessment    Outcome Measure Options AM-PAC 6 Clicks Basic Mobility (PT)  -           User Key  (r) = Recorded By, (t) = Taken By, (c) = Cosigned By    Initials Name Provider Type    Lynette Boyd, FENG Physical Therapist                             Physical Therapy Education                 Title:  PT OT SLP Therapies (In Progress)     Topic: Physical Therapy (Done)     Point: Mobility training (Done)     Learning Progress Summary           Patient Acceptance, E, VU by  at 1/9/2022 1321    Acceptance, E, VU by  at 1/8/2022 1532    Acceptance, E,D, VU,NR by MS at 1/7/2022 0925                   Point: Home exercise program (Done)     Learning Progress Summary           Patient Acceptance, E, VU by  at 1/9/2022 1321    Acceptance, E, VU by  at 1/8/2022 1532    Acceptance, E,D, VU,NR by MS at 1/7/2022 0925                   Point: Body mechanics (Done)     Learning Progress Summary           Patient Acceptance, E, VU by  at 1/9/2022 1321    Acceptance, E, VU by  at 1/8/2022 1532    Acceptance, E,D, VU,NR by MS at 1/7/2022 0925                   Point: Precautions (Done)     Learning Progress Summary           Patient Acceptance, E, VU by  at 1/9/2022 1321    Acceptance, E, VU by  at 1/8/2022 1532    Acceptance, E,D, VU,NR by MS at 1/7/2022 0925                               User Key     Initials Effective Dates Name Provider Type Discipline     06/16/21 -  Lynette Hinson, PT Physical Therapist PT    MS 06/16/21 -  Edi Anthony PT Physical Therapist PT              PT Recommendation and Plan     Plan of Care Reviewed With: patient  Progress: improving  Outcome Summary: Pt continues to be very motivated to work with PT. Pt performed bed mobility and LE exercises sitting EOB with SBA-min A. He required less rest breaks during exercises and demonstrated increased AROM with LLE. Pt continues to have N&V limiting his activity. Pt will benefit from continued skilled PT.     Time Calculation:    PT Charges     Row Name 01/09/22 1322             Time Calculation    Start Time 1124  -      Stop Time 1148  -      Time Calculation (min) 24 min  -      PT Received On 01/09/22  -      PT - Next Appointment 01/10/22  -            User Key  (r) = Recorded By, (t) = Taken By, (c) = Cosigned By     Initials Name Provider Type     Lynette Hinson, PT Physical Therapist              Therapy Charges for Today     Code Description Service Date Service Provider Modifiers Qty    58746399897 HC PT THER PROC EA 15 MIN 1/8/2022 Lynette Hinson, PT GP 1    42180663170 HC PT THER SUPP EA 15 MIN 1/8/2022 Lynette Hinson, PT GP 1    31014691643 HC PT THER PROC EA 15 MIN 1/9/2022 Lynette Hinson, PT GP 2          PT G-Codes  Outcome Measure Options: AM-PAC 6 Clicks Basic Mobility (PT)  AM-PAC 6 Clicks Score (PT): 12  AM-PAC 6 Clicks Score (OT): 15    Lynette Hinson PT  1/9/2022

## 2022-01-09 NOTE — PLAN OF CARE
Goal Outcome Evaluation:  Plan of Care Reviewed With: patient        Progress: improving  Outcome Summary: Pt continues to be very motivated to work with PT. Pt performed bed mobility and LE exercises sitting EOB with SBA-min A. He required less rest breaks during exercises and demonstrated increased AROM with LLE. Pt continues to have N&V limiting his activity. Pt will benefit from continued skilled PT.    Patient was not wearing a face mask during this therapy encounter. Therapist used appropriate personal protective equipment including eye protection, mask, and gloves.  Mask used was standard procedure mask. Appropriate PPE was worn during the entire therapy session. Hand hygiene was completed before and after therapy session. Patient is not in enhanced droplet precautions.

## 2022-01-10 LAB
BASOPHILS # BLD AUTO: 0.06 10*3/MM3 (ref 0–0.2)
BASOPHILS NFR BLD AUTO: 0.5 % (ref 0–1.5)
DEPRECATED RDW RBC AUTO: 40.5 FL (ref 37–54)
EOSINOPHIL # BLD AUTO: 0.54 10*3/MM3 (ref 0–0.4)
EOSINOPHIL NFR BLD AUTO: 4.7 % (ref 0.3–6.2)
ERYTHROCYTE [DISTWIDTH] IN BLOOD BY AUTOMATED COUNT: 13.8 % (ref 12.3–15.4)
GLUCOSE BLDC GLUCOMTR-MCNC: 133 MG/DL (ref 70–130)
GLUCOSE BLDC GLUCOMTR-MCNC: 143 MG/DL (ref 70–130)
GLUCOSE BLDC GLUCOMTR-MCNC: 157 MG/DL (ref 70–130)
GLUCOSE BLDC GLUCOMTR-MCNC: 179 MG/DL (ref 70–130)
HCT VFR BLD AUTO: 36 % (ref 37.5–51)
HGB BLD-MCNC: 12.2 G/DL (ref 13–17.7)
IMM GRANULOCYTES # BLD AUTO: 0.04 10*3/MM3 (ref 0–0.05)
IMM GRANULOCYTES NFR BLD AUTO: 0.3 % (ref 0–0.5)
LAB AP CASE REPORT: NORMAL
LYMPHOCYTES # BLD AUTO: 1.79 10*3/MM3 (ref 0.7–3.1)
LYMPHOCYTES NFR BLD AUTO: 15.5 % (ref 19.6–45.3)
MCH RBC QN AUTO: 28 PG (ref 26.6–33)
MCHC RBC AUTO-ENTMCNC: 33.9 G/DL (ref 31.5–35.7)
MCV RBC AUTO: 82.6 FL (ref 79–97)
MONOCYTES # BLD AUTO: 1.08 10*3/MM3 (ref 0.1–0.9)
MONOCYTES NFR BLD AUTO: 9.4 % (ref 5–12)
NEUTROPHILS NFR BLD AUTO: 69.6 % (ref 42.7–76)
NEUTROPHILS NFR BLD AUTO: 8.03 10*3/MM3 (ref 1.7–7)
NRBC BLD AUTO-RTO: 0 /100 WBC (ref 0–0.2)
PATH REPORT.FINAL DX SPEC: NORMAL
PATH REPORT.GROSS SPEC: NORMAL
PLATELET # BLD AUTO: 244 10*3/MM3 (ref 140–450)
PMV BLD AUTO: 9.6 FL (ref 6–12)
RBC # BLD AUTO: 4.36 10*6/MM3 (ref 4.14–5.8)
WBC NRBC COR # BLD: 11.54 10*3/MM3 (ref 3.4–10.8)

## 2022-01-10 PROCEDURE — 97530 THERAPEUTIC ACTIVITIES: CPT

## 2022-01-10 PROCEDURE — 63710000001 PROMETHAZINE PER 12.5 MG: Performed by: ORTHOPAEDIC SURGERY

## 2022-01-10 PROCEDURE — 82962 GLUCOSE BLOOD TEST: CPT

## 2022-01-10 PROCEDURE — 85025 COMPLETE CBC W/AUTO DIFF WBC: CPT | Performed by: ORTHOPAEDIC SURGERY

## 2022-01-10 PROCEDURE — 63710000001 INSULIN LISPRO (HUMAN) PER 5 UNITS: Performed by: INTERNAL MEDICINE

## 2022-01-10 PROCEDURE — 94799 UNLISTED PULMONARY SVC/PX: CPT

## 2022-01-10 PROCEDURE — 63710000001 ONDANSETRON PER 8 MG: Performed by: ORTHOPAEDIC SURGERY

## 2022-01-10 RX ORDER — PROMETHAZINE HYDROCHLORIDE 12.5 MG/1
12.5 TABLET ORAL EVERY 4 HOURS PRN
Status: DISCONTINUED | OUTPATIENT
Start: 2022-01-10 | End: 2022-01-11 | Stop reason: HOSPADM

## 2022-01-10 RX ORDER — SODIUM CHLORIDE 9 MG/ML
75 INJECTION, SOLUTION INTRAVENOUS CONTINUOUS
Status: DISCONTINUED | OUTPATIENT
Start: 2022-01-10 | End: 2022-01-11 | Stop reason: HOSPADM

## 2022-01-10 RX ADMIN — LAMOTRIGINE 25 MG: 25 TABLET ORAL at 08:45

## 2022-01-10 RX ADMIN — SODIUM CHLORIDE 75 ML/HR: 9 INJECTION, SOLUTION INTRAVENOUS at 12:34

## 2022-01-10 RX ADMIN — ROSUVASTATIN CALCIUM 40 MG: 40 TABLET, FILM COATED ORAL at 08:47

## 2022-01-10 RX ADMIN — DOCUSATE SODIUM 50MG AND SENNOSIDES 8.6MG 2 TABLET: 8.6; 5 TABLET, FILM COATED ORAL at 08:45

## 2022-01-10 RX ADMIN — LOSARTAN POTASSIUM 50 MG: 50 TABLET ORAL at 08:45

## 2022-01-10 RX ADMIN — PREGABALIN 50 MG: 25 CAPSULE ORAL at 08:45

## 2022-01-10 RX ADMIN — MORPHINE SULFATE 15 MG: 15 TABLET, FILM COATED, EXTENDED RELEASE ORAL at 08:45

## 2022-01-10 RX ADMIN — ACETAMINOPHEN 1000 MG: 500 TABLET ORAL at 22:25

## 2022-01-10 RX ADMIN — OXYCODONE HYDROCHLORIDE 10 MG: 5 TABLET ORAL at 12:39

## 2022-01-10 RX ADMIN — FAMOTIDINE 40 MG: 20 TABLET, FILM COATED ORAL at 08:45

## 2022-01-10 RX ADMIN — OXYCODONE HYDROCHLORIDE 10 MG: 5 TABLET ORAL at 03:18

## 2022-01-10 RX ADMIN — OXYCODONE HYDROCHLORIDE 10 MG: 5 TABLET ORAL at 17:45

## 2022-01-10 RX ADMIN — ONDANSETRON HYDROCHLORIDE 4 MG: 4 TABLET, FILM COATED ORAL at 03:19

## 2022-01-10 RX ADMIN — MODAFINIL 100 MG: 100 TABLET ORAL at 08:45

## 2022-01-10 RX ADMIN — ACETAMINOPHEN 1000 MG: 500 TABLET ORAL at 06:44

## 2022-01-10 RX ADMIN — OXYCODONE HYDROCHLORIDE 10 MG: 5 TABLET ORAL at 07:41

## 2022-01-10 RX ADMIN — PROMETHAZINE HYDROCHLORIDE 12.5 MG: 12.5 TABLET ORAL at 12:39

## 2022-01-10 RX ADMIN — CHOLECALCIFEROL TAB 10 MCG (400 UNIT) 400 UNITS: 10 TAB at 08:45

## 2022-01-10 RX ADMIN — ONDANSETRON HYDROCHLORIDE 4 MG: 4 TABLET, FILM COATED ORAL at 08:51

## 2022-01-10 RX ADMIN — ONDANSETRON HYDROCHLORIDE 4 MG: 4 TABLET, FILM COATED ORAL at 17:45

## 2022-01-10 RX ADMIN — ACETAMINOPHEN 1000 MG: 500 TABLET ORAL at 14:46

## 2022-01-10 RX ADMIN — LAMOTRIGINE 25 MG: 25 TABLET ORAL at 20:51

## 2022-01-10 RX ADMIN — MORPHINE SULFATE 15 MG: 15 TABLET, FILM COATED, EXTENDED RELEASE ORAL at 20:51

## 2022-01-10 RX ADMIN — OXYCODONE HYDROCHLORIDE 10 MG: 5 TABLET ORAL at 21:29

## 2022-01-10 RX ADMIN — INSULIN LISPRO 2 UNITS: 100 INJECTION, SOLUTION INTRAVENOUS; SUBCUTANEOUS at 12:33

## 2022-01-10 NOTE — PLAN OF CARE
Goal Outcome Evaluation:  Plan of Care Reviewed With: patient           Outcome Summary: Pt seen today for OT, co treat completed with PT. Pt able to progress to standing this date, he is up to EOB SBA and dons RLE prothesis. He is able to stand twice Min/Mod AX2. He became very nausea afterwards and vomitting sitting EOB. RN notifed and in room, pt put back to bed, min A to return self to supine. Plans to dc to rehab soon.    Appropriate PPE worn during encounter including gloves, mask, and eye protection. Hand hygiene completed. Pt did not wear a mask.

## 2022-01-10 NOTE — PLAN OF CARE
Goal Outcome Evaluation:  Plan of Care Reviewed With: patient        Progress: improving  Outcome Summary: Pt agreeable to work with PT/OT to try to stand today. Pt limited with mobility 2/2 N/V. Able to perform 2 STS today with min/modA x2, tolerates standing for ~30 secoonds. Pt back in bed at end of session, RN in room to address N/V. Continues to benefit from therapy, will progress as able.

## 2022-01-10 NOTE — PAYOR COMM NOTE
"STARTED OUTPT   CHANGED TO INPATIENT  1/8/22 @ 1020    REF #[id number]  F:  [phone]        Montez Boss (51 y.o. Male)             Date of Birth Social Security Number Address Home Phone MRN    1970  [address]  [place] [address] [phone] [id number]    Anabaptism Marital Status             Sikhism        Admission Date Admission Type Admitting Provider Attending Provider Department, Room/Bed    1/6/22 Elective Enrique Jain MD Price, Shawn L, MD [address], P799/1    Discharge Date Discharge Disposition Discharge Destination                         Attending Provider: Enrique Jain MD    Allergies: No Known Allergies    Isolation: None   Infection: None   Code Status: CPR   Advance Care Planning Activity    Ht: 182.9 cm (72\")   Wt: 117 kg (258 lb)    Admission Cmt: None   Principal Problem: Osteomyelitis of great toe of left foot (HCC) [M86.9] More...                 Active Insurance as of 1/6/2022     Primary Coverage     Payor Plan Insurance Group Employer/Plan Group    [employer] PPO [id number]     Payor Plan Address Payor Plan Phone Number Payor Plan Fax Number Effective Dates    [id number] [phone]  9/6/2021 - None Entered    [place] [address]       Subscriber Name Subscriber Birth Date Member ID       MONTEZ BOSS 1970 [id number]                 Emergency Contacts      (Rel.) Home Phone Work Phone Mobile Phone    TETE BOSS (Spouse) [phone] -- [phone]               History & Physical      Enrique Jain MD at 01/06/22 0800        H&P reviewed. The patient was examined and there are no changes to the H&P.  Consent verified.  Patient marked.  R/B/A discussed.    Enrique Jain M.D.    Electronically signed by Enrique Jain MD at 01/06/22 0824   Source Note     [facility] [place]: H[facility]P, [facility], 01/05/2022     "      Electronically signed by [employer] [place] at 01/06/22 0607             H&P signed by [employer] at 01/06/22 0607      Scan on 1/6/2022 by [employer] [place]: H&P, [facility], 01/05/2022          Electronically signed by [employer] [place] at 01/06/22 0607       {Outbreak/Travel/Exposure Documentation......;  Question Available Choices Patient Response   COVID-19 Outbreak Screen:  Do you currently have a new onset of the following symptoms?        Fever/Chills, Cough, Shortness of air, Loss of taste or smell, No, Unknown  No (01/06/22 0655)   COVID-19 Outbreak Screen: In the last 14 days, have you had contact with anyone who is ill, has show any of the symptoms listed above and/or has been diagnosis with the 2019 Novel Coronavirus? This includes any immediate household members but excludes any patients with whom you have been in contact within your normal work duties wearing proper PPE, if you are a healthcare worker.  Yes, No, Unknown              No (01/06/22 0655)   COVID-19 Outbreak Screen: Who was notified? Free text (not recorded)   Ebola Screening Outbreak Screen: Have you traveled to the [place] the [place] or [place] within the past 21 days?  Yes, No, Unknown (not recorded)   Ebola Screening Outbreak Screen: Do you have ANY of the following symptoms: Fever/Chills, Vomiting, Diarrhea, Fatigue, Headache, Muscle pain, Unexplained bleeding, Abdominal (stomach) pain, No, Unknown (not recorded)   Ebola Screening Outbreak Screen: Name of Person notified Free text (not recorded)   Travel Screen: Have you traveled in the last month? If so, to what country have you traveled? If [place] what state? Yes, No, Unknown  List of all countries  List of all States No (01/07/22 1958)  (not recorded)  (not recorded)   Infection Risk: Do you currently have the following symptoms?  (If cough is selected, the Tuberculosis Screen is performed.) Cough, Fever, Rash, No No (01/07/22  1958)   Tuberculosis Screen: Do you have any of the following Tuberculosis Risks?  · Have you lived or spent time with anyone who had or may have TB?  · Have you lived in or visited any of the following areas for more than one month: [place], [place], [place], [place] or [place], the [place] or [place]?  · Do you have HIV/AIDS?  · Have you lived in or worked in a nursing home, homeless shelter, correctional facility, or substance abuse treatment facility?   · No    If Yes do you have any of the following symptoms? Yes responses display to the right    If Yes, symptoms listed are:  Cough greater than or equal to 3 weeks, Loss of appetite, Unexplained weight loss, Night sweats, Bloody sputum or hemoptysis, Hoarseness, Fever, Fatigue, Chest pain, No (not recorded)  (not recorded)   Exposure Screen: Have you been exposed to any of these contagious diseases in the last month? Measles, Chickenpox, Meningitis, Pertussis, Whooping Cough, No No (01/07/22 1958)       Vital Signs (last day)     Date/Time Temp Temp src Pulse Resp BP Patient Position SpO2    01/10/22 1100 98.3 (36.8) Oral 93 18 124/75 Lying 95    01/10/22 0700 97.1 (36.2) Oral 96 16 136/82 Lying 96    01/09/22 2300 97.1 (36.2) Skin 78 16 139/77 Lying 96    01/09/22 1530 96.9 (36.1) Skin 97 16 120/75 Lying 93    01/09/22 0738 97.7 (36.5) Skin 82 15 138/89 Lying 96          Oxygen Therapy (last day)     Date/Time SpO2 Device (Oxygen Therapy) Flow (L/min) Oxygen Concentration (%) ETCO2 (mmHg)    01/10/22 1100 95 room air -- -- --    01/10/22 0845 -- room air -- -- --    01/10/22 0700 96 CPAP -- -- --    01/10/22 0400 -- CPAP -- -- --    01/09/22 2300 96 CPAP -- -- --    01/09/22 2100 -- room air -- -- --    01/09/22 1530 93 room air -- -- --    01/09/22 0738 96 room air -- -- --          Lines, Drains & Airways     Active LDAs     Name Placement date Placement time Site Days    Peripheral IV 01/08/22 1710 Left; Posterior Hand 01/08/22  1710  Hand  1                   Medication Administration Report for Montez Guy as of 01/10/22 1211   Legend:    Given Hold Not Given Due Canceled Entry Other Actions    Time Time (Time) Time  Time-Action       Discontinued     Completed     Future     MAR Hold     Linked           Medications 01/08/22 01/09/22 01/10/22    acetaminophen (TYLENOL) tablet 1,000 mg  Dose: 1,000 mg  Freq: Every 8 Hours Route: PO  Start: 01/07/22 1145   Admin Instructions:   Do not exceed 4 grams of acetaminophen in a 24 hr period. Max dose of 2gm for AST/ALT greater than 120 units/L      If given for pain, use the following pain scale:   Mild Pain = Pain Score of 1-3, CPOT 1-2  Moderate Pain = Pain Score of 4-6, CPOT 3-4  Severe Pain = Pain Score of 7-10, CPOT 5-8       0556-Given     1432-Given     2132-Given           0625-Given     1420-Given     2242-Given           0644-Given     1400     2200           cholecalciferol (VITAMIN D3) tablet 400 Units  Dose: 400 Units  Freq: Daily Route: PO  Start: 01/06/22 1500       0837-Given             0822-Given             0845-Given             enoxaparin (LOVENOX) syringe 40 mg  Dose: 40 mg  Freq: Every 24 Hours Route: SC  Indications of Use: PROPHYLAXIS OF VENOUS THROMBOEMBOLISM  Start: 01/07/22 0000   Admin Instructions:   Give subcutaneous in abdomen only. Do not massage site after injection.        0101-Given     2334-Given             famotidine (PEPCID) tablet 40 mg  Dose: 40 mg  Freq: Daily Route: PO  Start: 01/06/22 1500       0837-Given             0822-Given             0845-Given             insulin lispro (ADMELOG) injection 0-9 Units  Dose: 0-9 Units  Freq: 3 Times Daily Before Meals Route: SC  Start: 01/06/22 1730   Admin Instructions:   Correction - Moderate Dose.  40-60 units/day total insulin dose or average weight, on oral agents    Blood glucose 150-199 mg/dL - 2 units  Blood glucose 200-249 mg/dL - 4 units  Blood glucose 250-299 mg/dL - 6 units  Blood glucose 300-349 mg/dL - 7  units  Blood glucose 350-400 mg/dL - 8 units  Blood glucose greater than 400 mg/dL - 9 units and call provider   Caution: Look alike/sound alike drug alert       (0631)-Not Given     1231-Given     1701-Given           (0942)-Not Given     1227-Given     (1643)-Not Given           (0806)-Not Given     1130     1730           lamoTRIgine (LaMICtal) tablet 25 mg  Dose: 25 mg  Freq: 2 Times Daily Route: PO  Start: 01/06/22 2100   Admin Instructions:   Caution: Look alike/sound alike drug alert       0837-Given     2132-Given            0822-Given     2240-Given            0845-Given     2100            losartan (COZAAR) tablet 50 mg  Dose: 50 mg  Freq: Every 24 Hours Scheduled Route: PO  Start: 01/08/22 0900       0837-Given             0822-Given             0845-Given             modafinil (PROVIGIL) tablet 100 mg  Dose: 100 mg  Freq: Daily Route: PO  Start: 01/06/22 1500   Admin Instructions:          0837-Given             0822-Given             0845-Given             Morphine (MS CONTIN) 12 hr tablet 15 mg  Dose: 15 mg  Freq: Every 12 Hours Scheduled Route: PO  Start: 01/07/22 2100   End: 01/14/22 2059   Admin Instructions:   Swallow whole. Do not crush, split or chew.      Caution: Look alike/sound alike drug alert    If given for pain, use the following pain scale:  Mild Pain = Pain Score of 1-3, CPOT 1-2  Moderate Pain = Pain Score of 4-6, CPOT 3-4  Severe Pain = Pain Score of 7-10, CPOT 5-8       0837-Given     2132-Given            0822-Given     2240-Given            0845-Given     2100            pregabalin (LYRICA) capsule 50 mg  Dose: 50 mg  Freq: Daily Route: PO  Start: 01/06/22 1445   Admin Instructions:          0837-Given             0822-Given             0845-Given             rosuvastatin (CRESTOR) tablet 40 mg  Dose: 40 mg  Freq: Daily Route: PO  Start: 01/06/22 1500       0837-Given             0822-Given             0847-Given             Semaglutide(0.25 or 0.5MG/DOS) (OZEMPIC) solution  pen-injector 0.25 mg  Dose: 0.25 mg  Freq: Weekly Route: SC  Start: 01/07/22 0900   Order specific questions:   Drug Name: ozempic 2mg/1.5ml solution pen-injector            sennosides-docusate (PERICOLACE) 8.6-50 MG per tablet 2 tablet  Dose: 2 tablet  Freq: 2 Times Daily Route: PO  Start: 01/06/22 2100   Admin Instructions:   HOLD MEDICATION IF PATIENT HAS HAD BOWEL MOVEMENT. Start bowel management regimen if patient has not had a bowel movement after 12 hours.       0837-Given     2132-Given            0822-Given     2240-Given            0845-Given     2100           And  polyethylene glycol (MIRALAX) packet 17 g  Dose: 17 g  Freq: Daily PRN Route: PO  PRN Reason: Constipation  PRN Comment: Use if senna-docusate is ineffective  Start: 01/06/22 1345   Admin Instructions:   Use if no bowel movement after 12 hours. Mix in 6-8 ounces of water.  Use 4-8 ounces of water, tea, or juice for each 17 gram dose.         And  bisacodyl (DULCOLAX) EC tablet 5 mg  Dose: 5 mg  Freq: Daily PRN Route: PO  PRN Reason: Constipation  PRN Comment: Use if polyethylene glycol is ineffective  Start: 01/06/22 1345   Admin Instructions:   Use if no bowel movement after 12 hours.  Swallow whole. Do not crush, split, or chew tablet.        0822-Given             And  bisacodyl (DULCOLAX) suppository 10 mg  Dose: 10 mg  Freq: Daily PRN Route: RE  PRN Reason: Constipation  PRN Comment: Use if bisacodyl oral is ineffective  Start: 01/06/22 1345   Admin Instructions:   Use if no bowel movement after 12 hours.        1608-Given             Completed Medications  Medications 01/08/22 01/09/22 01/10/22      ,   Medication Administration Report for Montez Guy as of 01/10/22 1211   Legend:    Given Hold Not Given Due Canceled Entry Other Actions    Time Time (Time) Time  Time-Action       Discontinued     Completed     Future     MAR Hold     Linked           Medications 01/08/22 01/09/22 01/10/22    sodium chloride 0.9 % infusion  Rate: 75  mL/hr Dose: 75 mL/hr  Freq: Continuous Route: IV  Start: 01/10/22 1200         1200                and   Medication Administration Report for Montez Guy as of 01/10/22 1211   Legend:    Given Hold Not Given Due Canceled Entry Other Actions    Time Time (Time) Time  Time-Action       Discontinued     Completed     Future     MAR Hold     Linked           Medications 01/08/22 01/09/22 01/10/22    sennosides-docusate (PERICOLACE) 8.6-50 MG per tablet 2 tablet  Dose: 2 tablet  Freq: 2 Times Daily Route: PO  Start: 01/06/22 2100   Admin Instructions:   HOLD MEDICATION IF PATIENT HAS HAD BOWEL MOVEMENT. Start bowel management regimen if patient has not had a bowel movement after 12 hours.       0837-Given     2132-Given            0822-Given     2240-Given            0845-Given     2100           And  polyethylene glycol (MIRALAX) packet 17 g  Dose: 17 g  Freq: Daily PRN Route: PO  PRN Reason: Constipation  PRN Comment: Use if senna-docusate is ineffective  Start: 01/06/22 1345   Admin Instructions:   Use if no bowel movement after 12 hours. Mix in 6-8 ounces of water.  Use 4-8 ounces of water, tea, or juice for each 17 gram dose.         And  bisacodyl (DULCOLAX) EC tablet 5 mg  Dose: 5 mg  Freq: Daily PRN Route: PO  PRN Reason: Constipation  PRN Comment: Use if polyethylene glycol is ineffective  Start: 01/06/22 1345   Admin Instructions:   Use if no bowel movement after 12 hours.  Swallow whole. Do not crush, split, or chew tablet.        0822-Given             And  bisacodyl (DULCOLAX) suppository 10 mg  Dose: 10 mg  Freq: Daily PRN Route: RE  PRN Reason: Constipation  PRN Comment: Use if bisacodyl oral is ineffective  Start: 01/06/22 1345   Admin Instructions:   Use if no bowel movement after 12 hours.        1608-Given              HYDROmorphone (DILAUDID) injection 0.5 mg  Dose: 0.5 mg  Freq: Every 4 Hours PRN Route: IV  PRN Reason: Severe Pain   PRN Comment: pain  Start: 01/06/22 1345   End: 01/09/22 1223    Admin Instructions:   If given for pain, use the following pain scale:  Mild Pain = Pain Score of 1-3, CPOT 1-2  Moderate Pain = Pain Score of 4-6, CPOT 3-4  Severe Pain = Pain Score of 7-10, CPOT 5-8       1231-Given     1701-Given     2125-Given           0356-Given     1148-Given            And  naloxone (NARCAN) injection 0.4 mg  Dose: 0.4 mg  Freq: Every 5 Minutes PRN Route: IV  PRN Reason: Respiratory Depression  Start: 01/06/22 1345   Admin Instructions:   If respiratory rate is less than 8 breaths/minute or patient is difficult to arouse stop any narcotics and contact physician.   Administer slow IV push. Repeat as ordered until patient's respiratory rate is greater than 12 breaths/minute.          ondansetron (ZOFRAN) tablet 4 mg  Dose: 4 mg  Freq: Every 6 Hours PRN Route: PO  PRN Reasons: Nausea,Vomiting  Start: 01/06/22 1345   Admin Instructions:   If BOTH ondansetron (ZOFRAN) & promethazine (PHENERGAN) Ordered, Use ondansetron First & THEN promethazine IF ondansetron Ineffective.       1231-Not Given:  See Alt     1832-Not Given:  See Alt            0356-Not Given:  See Alt     0942-Not Given:  See Alt     2019-Not Given:  See Alt           0319-Given     0851-Given           Or  ondansetron (ZOFRAN) injection 4 mg  Dose: 4 mg  Freq: Every 6 Hours PRN Route: IV  PRN Reasons: Nausea,Vomiting  Start: 01/06/22 1345   Admin Instructions:   If BOTH ondansetron (ZOFRAN) & promethazine (PHENERGAN) Ordered, Use ondansetron First & THEN promethazine IF ondansetron Ineffective.       1231-Given     1832-Given            0356-Given     0942-Given     2019-Given           0319-Not Given:  See Alt     0851-Not Given:  See Alt            oxyCODONE (ROXICODONE) immediate release tablet 10 mg  Dose: 10 mg  Freq: Every 4 Hours PRN Route: PO  PRN Reason: Moderate Pain   Start: 01/07/22 1042   End: 01/14/22 1041   Admin Instructions:       If given for pain, use the following pain scale:  Mild Pain = Pain Score of 1-3,  "CPOT 1-2  Moderate Pain = Pain Score of 4-6, CPOT 3-4  Severe Pain = Pain Score of 7-10, CPOT 5-8       0221-Given     0556-Given     1048-Given       1432-Given     1831-Given            0628-Given     1420-Given     1800-Given       2230-Given             0318-Given     0741-Given            promethazine (PHENERGAN) tablet 12.5 mg  Dose: 12.5 mg  Freq: Every 4 Hours PRN Route: PO  PRN Reasons: Nausea,Vomiting  Start: 01/10/22 1139   Admin Instructions:                   Orders (active)      Start     Ordered    01/10/22 1200  sodium chloride 0.9 % infusion  Continuous         01/10/22 1109    01/10/22 1139  promethazine (PHENERGAN) tablet 12.5 mg  Every 4 Hours PRN         01/10/22 1139    01/08/22 0900  losartan (COZAAR) tablet 50 mg  Every 24 Hours Scheduled         01/07/22 0834    01/07/22 2100  Morphine (MS CONTIN) 12 hr tablet 15 mg  Every 12 Hours Scheduled         01/07/22 1900    01/07/22 1145  acetaminophen (TYLENOL) tablet 1,000 mg  Every 8 Hours         01/07/22 1051    01/07/22 1051  Code Status and Medical Interventions:  Continuous         01/07/22 1051    01/07/22 1042  oxyCODONE (ROXICODONE) immediate release tablet 10 mg  Every 4 Hours PRN         01/07/22 1051    01/07/22 0900  Semaglutide(0.25 or 0.5MG/DOS) (OZEMPIC) solution pen-injector 0.25 mg  Weekly         01/06/22 1637    01/07/22 0832  Its okay if patient would like to take her home dose of Ozempic while inpatient if he can supply since nonformulary  Nursing Communication  Once        Comments: Its okay if patient would like to take her home dose of Ozempic while inpatient if he can supply since nonformulary    01/07/22 0831    01/07/22 0000  enoxaparin (LOVENOX) syringe 40 mg  Every 24 Hours         01/06/22 1345    01/06/22 2100  lamoTRIgine (LaMICtal) tablet 25 mg  2 Times Daily         01/06/22 1345    01/06/22 2100  sennosides-docusate (PERICOLACE) 8.6-50 MG per tablet 2 tablet  2 Times Daily        \"And\" Linked Group Details    " 01/06/22 1345    01/06/22 1730  insulin lispro (ADMELOG) injection 0-9 Units  3 Times Daily Before Meals         01/06/22 1519    01/06/22 1700  POC Glucose 4x Daily AC & at Bedtime  4 Times Daily Before Meals & at Bedtime      Comments: If bedtime blood glucose is greater than 350 mg/dl, call MD.      01/06/22 1519    01/06/22 1519  Do NOT Hold Basal or Correction Scale Insulin When Patient is NPO, Hold Scheduled Mealtime (Bolus) Insulin if NPO  Continuous         01/06/22 1519    01/06/22 1519  Follow [facility] Hypoglycemia Standing Orders For Blood Glucose Less Than 70 mg/dL  Until Discontinued        Comments: ALERT PATIENT - NOT NPO & CAN SAFELY SWALLOW  Administer 4 oz Fruit Juice OR 4 oz Regular Soda OR 8 oz Milk OR 15-30 grams (1 tube) of Glucose Gel.  Recheck Blood Glucose Approximately 15 Minutes After Ingestion, Repeat Treatment & Continue to Recheck Blood Sugar Approximately Every 15 Minutes Until Blood Glucose is 70 or Higher.  Once Blood Glucose is 70 or Higher & if It Will Be More Than 60 Minutes Until Next Meal, Provide Appropriate Snack (Including Carbohydrate Food) Based on Meal Plan Order. Give Meal Tray As Soon As Possible.    PATIENT HAS IV ACCESS - UNRESPONSIVE, NPO OR UNABLE TO SAFELY SWALLOW  Administer 25g (50ml) D50W IV Push.  Recheck Blood Glucose Approximately 15 Minutes After Administration, if Blood Glucose Remains Less Than 70, Repeat Treatment   Recheck Blood Glucose Approximately 15 Minutes After 2nd Administration, if Blood Glucose Remains Less Than 70 After 2nd Dose of D50W, Contact Provider for Further Treatment Orders & Consider Adding IVF With D5W for Maintenance    PATIENT WITHOUT IV ACCESS - UNRESPONSIVE, NPO OR UNABLE TO SAFELY SWALLOW  Administer 1mg Glucagon SQ & Establish IV Access.  Turn Patient on Side - Nausea / Vomiting May Occur.  Recheck Blood Glucose Approximately 15 Minutes After Administration.  If Blood Glucose Remains Less Than 70, Administer 25g D50W IV Push  (50ml).  Recheck Blood Glucose Approximately 15 Minutes After Administration of D50W, if Blood Glucose Remains Less Than 70, Contact Provider for Further Treatment Orders & Consider Adding IVF With D5 for Maintenance    Document Event & Patient Response to Interventions in EMR, Document Medications on MAR  Notify Provider if Hypoglycemia Treatment Needed    01/06/22 1519    01/06/22 1518  glucagon (human recombinant) (GLUCAGEN DIAGNOSTIC) injection 1 mg  Every 15 Minutes PRN         01/06/22 1519    01/06/22 1518  dextrose (GLUTOSE) oral gel 15 g  Every 15 Minutes PRN         01/06/22 1519    01/06/22 1518  dextrose (D50W) (25 g/50 mL) IV injection 25 g  Every 15 Minutes PRN         01/06/22 1519    01/06/22 1500  modafinil (PROVIGIL) tablet 100 mg  Daily         01/06/22 1345    01/06/22 1500  rosuvastatin (CRESTOR) tablet 40 mg  Daily         01/06/22 1345    01/06/22 1500  cholecalciferol (VITAMIN D3) tablet 400 Units  Daily         01/06/22 1345    01/06/22 1500  famotidine (PEPCID) tablet 40 mg  Daily         01/06/22 1345    01/06/22 1445  pregabalin (LYRICA) capsule 50 mg  Daily         01/06/22 1345    01/06/22 1346  Strict Intake and Output  Every Shift       01/06/22 1345    01/06/22 1346  Vital Signs Per Hospital Policy  Per Hospital Policy         01/06/22 1345    01/06/22 1346  Activity (Specify Details)  Until Discontinued         01/06/22 1345    01/06/22 1346  Trapeze to Bed  Once         01/06/22 1345    01/06/22 1346  Elevate Extremity  Continuous         01/06/22 1345    01/06/22 1346  Non Weight Bearing  Once        Comments: LLE    01/06/22 1345    01/06/22 1346  Notify Provider (With Parameters)  Until Discontinued         01/06/22 1345    01/06/22 1346  Diet Regular; Consistent Carbohydrate  Diet Effective Now         01/06/22 1345    01/06/22 1346  PT Consult: Eval & Treat  Once         01/06/22 1345    01/06/22 1346  OT Consult: Eval & Treat  Once         01/06/22 1345    01/06/22 1345   "naloxone (NARCAN) injection 0.4 mg  Every 5 Minutes PRN        \"And\" Linked Group Details    22 1345    22 1345  polyethylene glycol (MIRALAX) packet 17 g  Daily PRN        \"And\" Linked Group Details    22 1345    22 1345  bisacodyl (DULCOLAX) EC tablet 5 mg  Daily PRN        \"And\" Linked Group Details    22 1345    22 1345  bisacodyl (DULCOLAX) suppository 10 mg  Daily PRN        \"And\" Linked Group Details    22 1345    22 1345  ondansetron (ZOFRAN) tablet 4 mg  Every 6 Hours PRN        \"Or\" Linked Group Details    22 1345    22 1345  ondansetron (ZOFRAN) injection 4 mg  Every 6 Hours PRN        \"Or\" Linked Group Details    22 1345    22 1345  budesonide-formoterol (SYMBICORT) 160-4.5 MCG/ACT inhaler 1 puff  2 Times Daily PRN         22 1345    22 1134  Pulse Oximetry, Continuous  Continuous         22 1133    22 0000  Follow Anesthesia Guidelines / Protocol         22 1356    22 0000  Provide Instructions to Patient Regarding NPO Status         22 1356    Unscheduled  Oxygen Therapy- Nasal Cannula; Titrate for SPO2: Per Policy  Continuous PRN       22 0648    Unscheduled  Oxygen Therapy- Nasal Cannula; Titrate for SPO2: per policy  Continuous PRN       22 1133    Unscheduled  Apply Ice to Affected Area  As Needed       22 1345    Unscheduled  Patient May Use Home CPAP / BIPAP For Sleep or As Needed  As Needed       22 1519    Unscheduled  Apply Ice To Affected Area  As Needed      Comments: May open stump protector to apply ice    22                   Operative/Procedure Notes       Enrique Jain MD at 22 0906  Version 1 of 1         Orthopedic Operative Note     Name: Montez Guy   MRN: [id number]    :  1970    Date of Surgery:2022     Pre-op Diagnosis:   1.  Diabetic neuropathy  2.  Peripheral vascular disease  3.  Chronic ulceration, MRSA " with associated osteomyelitis left great toe left small toe     Post-op Diagnosis:  Same       Procedure(s):  Left below-knee amputation     Surgeon(s):  Enrique Jain MD     Anesthesia:  General with block     Staff:   Circulator: Colleen Lebron RN; Rocio Angel RN  Scrub Person: Allison Gilmore    Estimated Blood Loss: 100ml    Specimens:                Order Name Source Comment Collection Info Order Time   TISSUE PATHOLOGY EXAM Leg, Left  Collected By: Enrique Jain MD 1/6/2022 10:16 AM     Release to patient   Immediate            Complications:  None     Implants:  None     Indications: Mr. Guy is a 51 y.o. male diabetes associated neuropathy peripheral vascular disease and osteomyelitis involving the left great and small toe.  Instead of amputations about the foot the patient wanted to proceed with more definitive surgical correction because of failed wound care and antimicrobials and his experience with his RLE.  Risks of the procedure were discussed these included were not limited to bleeding, infection, damage to adjacent structures, DVT, PE, fracture, need for additional procedures, phantom limb pain, neuropathic pain, wound necrosis. The patient understood the risks and elected to proceed.     Description of procedure:     Montez Guy was seen and evaluated in preoperative holding area found to be neurovascularly intact the operative extremity was confirmed and marked as the operative extremity.  Consent was verified.     The patient  was transferred to the OR#25 at [facility].  Satisfactory anesthesia was induced and the patient was positioned on the operative table.  The operative extremity was preprepped with alcohol then prepped and draped normal sterile fashion with ChloraPrep.  Timeout was performed on agreement patient's identity, laterality procedure to be performed and 1 g of vancomycin was administered prior to incision.  Gravity was utilized to exsanguinate  the extremity.  Tourniquet was inflated at the level of the thigh at 250 mmHg for a total of 64 minutes.       Approximately 15 cm from the medial joint line, anterior flap was marked this was followed by marking of the long posterior flap.  Skin incision was made anteriorly focusing first on the anterior compartment.  Bovie cautery was utilized to dissect through the anterior compartment musculature.  The neurovascular bundle was identified nerves were injected cut sharply and allowed to retract proximally and vessels were double ligated and cut proximal to the planned level of bony resection.  Then moved to the fibula and dissected approximately 1 cm proximal to the plan tibial cut dissection.  The was peroneal vessels were identified and tied off.  Moving, medially, the saphenous vein and nerve were identified and ligated appropriately.  The posterior flap was then made, incising the skin medially, laterally and distally.  The neurovascular bundle was identified at the ankle and clamped.  A saw was utilized to cut the tibia followed by the fibula 1 cm proximal to the distal tibial cut.  Amputation knife was then utilized to dissect the remaining portion of the posterior compartment and the limb was passed off. The sural nerve was identified and sharply cut and the lesser saphenous veins were cauterized.    Pressure was held over the wound and the tourniquet was deflated.  After adequate hemostasis, the anterior aspect of the tibia was beveled.  A drill was placed in the anterior tibial cortex for the purposes of myodesis.  [place] sutures were placed with #5 Tycron suture into the gastroc.  The limbs were then passed through the bony tunnels.  The remaining musculature of the gastroc was reapproximated to the periosteum medially and to the anterior compartment and anterior compartment  fascia anteroaterally.  This was performed with 0 Vicryl.  Prior to definitive myodesis the drain was placed deep.  The deep  "dermal layers were reapproximated with 2-0 Vicryl.  Laterally there was found to be some patulous skin and the dog ear which was corrected.     The skin was reapproximated with staples.  Dressings were then applied and the residual limb was placed in a Rooke boot.    End of the procedure all sponge and needle counts were correct.  I was present conducted the entire to the procedure.          Enrique Jain M.D.  2022    Electronically signed by Enrique Jain MD at 22 1350          Physician Progress Notes       Enrique Jain MD at 01/10/22 1133          ORTHOPAEDIC/MUSCULOSKELETAL ONCOLOGIC PROGRESS NOTE  [facility]  Enrique Jain M.D.    Patient Identification:  Name: Montez Guy  :  1970  MRN: [id number]        Subjective:   Did well overnight.  Nauseous when he was working with physical therapy today with emesis.  Denies chest pain, shortness of breath, states that he has been having night chills.  Has been afebrile.  States that his pain is well controlled.  Has had a bowel movement.      Allergies:  No Known Allergies      Objective:  tMax 24 hrs: Temp (24hrs), Av.4 °F (36.3 °C), Min:96.9 °F (36.1 °C), Max:98.3 °F (36.8 °C)    Vitals Ranges:   Temp:  [96.9 °F (36.1 °C)-98.3 °F (36.8 °C)] 98.3 °F (36.8 °C)  Heart Rate:  [78-97] 93  Resp:  [16-18] 18  BP: (120-139)/(75-82) 124/75  Intake and Output Last 3 Shifts:   I/O last 3 completed shifts:  In: 480 [P.O.:480]  Out: 2460 [Urine:2460]    Physical Exam:  /75 (BP Location: Left arm, Patient Position: Lying)   Pulse 93   Temp 98.3 °F (36.8 °C) (Oral)   Resp 18   Ht 182.9 cm (72\")   Wt 117 kg (258 lb)   SpO2 95%   BMI 34.99 kg/m²     General Appearance:    Alert, cooperative, no distress, appears stated age   HEENT:    Normocephalic, atraumatic. Sclerae anicteric.  Mucous         membranes moist.   Lungs:     Breathing unlabored on room air   Abdomen:     Soft, nontender, nondistended. " "  Focused MSK:  Left residual limb still with swelling no erythema mild serous drainage medially there is a dark area on the posterior aspect of the flap which measures less than 1 cm       Data Review:  Lab Results (last 24 hours)     Procedure Component Value Units Date/Time    POC Glucose Once [[id number]]  (Abnormal) Collected: 01/10/22 1044    Specimen: Blood Updated: 01/10/22 1045     Glucose 179 mg/dL      Comment: Meter: [id number] : [id number] Viviana  Ju ZOFIA       Tissue Pathology Exam [[id number]] Collected: 01/06/22 1010    Specimen: Tissue from Leg, Left Updated: 01/10/22 1039     Case Report --     Surgical Pathology Report                         Case: [id number]                                  Authorizing Provider:  Enrique Jain MD         Collected:           01/06/2022 10:10 AM          Ordering Location:     [facility]  Received:            01/06/2022 10:52 AM                                 MAIN OR                                                                      Pathologist:           Naeem Mcdaniel MD                                                         Specimen:    Leg, Left, LEFT BELOW KNEE AMPUTATION                                                       Final Diagnosis --     1. Leg, Left, Below-the-Knee Amputation:  Benign below-the-knee amputation of the left leg with    A. Viable tissue at the surgical margin.   B. Peripheral vascular disease.   C. Ulceration.   D. Cellulitis.   E. No definitive acute or chronic osteomyelitis.    John/kds       Gross Description --     1.  Received fresh labeled \"left below the knee amputation\" is a left below the knee amputation with attached foot with digits 1, 4 and 5.  The amputation sites of digits 2 and 3 are well healed.  There is no toenail associated with digit 5.  Digits 1 and 4 have attached toenails.  On the volar surface of the great toe is a 4.0 x 1.5 cm ulcerative lesion.  The skin, soft tissue and bone " margins are grossly viable.  The remainder of the specimen is has the following dimensions:  Exposed tibia - 2.5 cm, exposed fibula - 8 cm, margin of resection to heel - 43 cm, heel to great toe - 24.5 cm.  The dorsalis pedis is dissected to reveal a pin point lumen with no gross evidence of atherosclerosis.  The posterior tibial neurovascular bundle is dissected to reveal a pin point lumen with no gross evidence of atherosclerosis.  The anterior tibial neurovascular bundle is dissected to reveal 50% stenosis with atherosclerosis.  Sectioning through the distal phalangeal bone of the great toe subjacent to the ulcerative lesion reveals a tan yellow trabecular bone.  Representative sections are submitted as follows:   1A - proximal marrow from the tibia.  1B - proximal skin and soft tissue margins en face.  1C - dorsalis pedis.  1D - anterior tibial neurovascular bundle with atherosclerosis.  1E - posterior tibial neurovascular bundle.   1F - ulcerative wound, volar surface, great toe.  1G - phalangeal bone subjacent to the ulcerative wound on the great toe following decalcification.    jap/uso/mec/brb      CBC & Differential [[id number]]  (Abnormal) Collected: 01/10/22 0921    Specimen: Blood Updated: 01/10/22 0928    Narrative:      The following orders were created for panel order CBC & Differential.  Procedure                               Abnormality         Status                     ---------                               -----------         ------                     CBC Auto Differential[[id number]]        Abnormal            Final result                 Please view results for these tests on the individual orders.    CBC Auto Differential [[id number]]  (Abnormal) Collected: 01/10/22 0921    Specimen: Blood Updated: 01/10/22 0928     WBC 11.54 10*3/mm3      RBC 4.36 10*6/mm3      Hemoglobin 12.2 g/dL      Hematocrit 36.0 %      MCV 82.6 fL      MCH 28.0 pg      MCHC 33.9 g/dL      RDW 13.8 %      RDW-SD 40.5 fl       MPV 9.6 fL      Platelets 244 10*3/mm3      Neutrophil % 69.6 %      Lymphocyte % 15.5 %      Monocyte % 9.4 %      Eosinophil % 4.7 %      Basophil % 0.5 %      Immature Grans % 0.3 %      Neutrophils, Absolute 8.03 10*3/mm3      Lymphocytes, Absolute 1.79 10*3/mm3      Monocytes, Absolute 1.08 10*3/mm3      Eosinophils, Absolute 0.54 10*3/mm3      Basophils, Absolute 0.06 10*3/mm3      Immature Grans, Absolute 0.04 10*3/mm3      nRBC 0.0 /100 WBC     POC Glucose Once [[id number]]  (Abnormal) Collected: 01/10/22 0634    Specimen: Blood Updated: 01/10/22 0636     Glucose 143 mg/dL      Comment: Meter: [id number] : [id number] [place] InSequenttorie NA       Blood Culture - Blood, Arm, Left [[id number]]  (Normal) Collected: 22 022    Specimen: Blood from Arm, Left Updated: 01/10/22 0230     Blood Culture No growth at 2 days    POC Glucose Once [[id number]]  (Abnormal) Collected: 22 2153    Specimen: Blood Updated: 22 2154     Glucose 270 mg/dL      Comment: Meter: [id number] : [id number] BryanSchoolnet NA       POC Glucose Once [[id number]]  (Abnormal) Collected: 22 1617    Specimen: Blood Updated: 22 1621     Glucose 140 mg/dL      Comment: Meter: [id number] : [id number] Mo Alaniz RN               Assessment:  Montez Guy is a 51 y.o. male Hospital day 5, postoperative day #4 status post left below-knee amputation.        Medical decision-makin.  Pain: Continue current regimen.  2.  Slight increase in white blood cell count we will continue to follow, CBC in the morning.  3.  Medical management per the hospitalist service, appreciate their input.  4.  Continue low molecular weight heparin for DVT prophylaxis  5.  Continue to follow blood cultures  6.  Continue with physical and Occupational Therapy  7.  Continue elevation and ice for pain control  8.  Dressing changed at this morning  9.  Had a discussion with the patient in regards to the findings of the medial  "aspect of the wound.  The area is small at this time.  Explained that we will follow and that there may be a need for excision should the area be deemed nonviable  10.  We will start IV fluids to help with the patient's orthostasis.    11.  Will prescribe Phenergan as needed to help with the nausea and vomiting.    Enrique Jain M.D.  1/10/2022    Electronically signed by Enrique Jain MD at 01/10/22 1139     Enrique Jain MD at 22 1217          ORTHOPAEDIC/MUSCULOSKELETAL ONCOLOGIC PROGRESS NOTE  [facility]  Enrique Jain M.D.    Patient Identification:  Name: Montez Guy  :  1970  MRN: [id number]        Subjective:   Emesis this am.  Otherwise TERESE.  Denies CP/SOB.  No BM      Allergies:  No Known Allergies      Objective:  tMax 24 hrs: Temp (24hrs), Av.6 °F (36.4 °C), Min:96.9 °F (36.1 °C), Max:98.3 °F (36.8 °C)    Vitals Ranges:   Temp:  [96.9 °F (36.1 °C)-98.3 °F (36.8 °C)] 97.7 °F (36.5 °C)  Heart Rate:  [72-85] 82  Resp:  [15-16] 15  BP: (125-139)/(78-89) 138/89  Intake and Output Last 3 Shifts:   I/O last 3 completed shifts:  In: 1320 [P.O.:1320]  Out: 4085 [Urine:4085]    Physical Exam:  /89 (BP Location: Left arm, Patient Position: Lying)   Pulse 82   Temp 97.7 °F (36.5 °C) (Skin)   Resp 15   Ht 182.9 cm (72\")   Wt 117 kg (258 lb)   SpO2 96%   BMI 34.99 kg/m²     General Appearance:    Alert, cooperative, no distress, appears stated age   HEENT:    Normocephalic, atraumatic. Sclerae anicteric.  Mucous         membranes moist.   Lungs:     Breathing unlabored on room air   Abdomen:     Soft, nontender, nondistended.   Focused MSK:   LLE incision without SOI           Assessment:  Montez Guy is a 51 y.o. male POD#3 left below-knee amputation.  Patient overall is doing well.  Emesis this morning.  Has been afebrile for 24 hours.    Current problems on hospital day #4 include:  1.  Pain  2.  Diabetes  3.  Hypertension  4.  No " "BM      Medical decision-makin.  We will discontinue Dilaudid otherwise continue current oral pain regimen  2.  Continue with current medical management of diabetes and hypertension.  I appreciate the input of the hospitalist team  3.  Aggressive bowel regimen  4.  Dressing changed this morning  5.  Continue low molecular weight heparin for DVT prophylaxis  6.  Follow blood cultures  7.  Continue physical and occupational therapy, nonweightbearing left lower extremity  8.  Check a.m. TOÑA Jain M.D.  2022    Electronically signed by Enrique Jain MD at 22 1222     Nuno Gallardo MD at 22 0943              DAILY PROGRESS NOTE  [facility]    Patient Identification:  Name: Montez Guy  Age: 51 y.o.  Sex: male  :  1970  MRN: [id number]         Primary Care Physician: Jeremiah Sanchez MD    Subjective:  Interval History: Notes some nausea and intermittent emesis mainly associated with pain med management.  Patient is otherwise opiate naïve and has been titrated up on MS Contin scheduled realizing Dilaudid and oxycodone on a as needed basis    Objective: Conversational and pleasant.  Does not appear overwhelmingly toxic.      Vital signs in last 24 hours:  Temp:  [96.9 °F (36.1 °C)-98.7 °F (37.1 °C)] 97.7 °F (36.5 °C)  Heart Rate:  [72-85] 82  Resp:  [15-16] 15  BP: (125-139)/(78-89) 138/89    Intake/Output:    Intake/Output Summary (Last 24 hours) at 2022 0943  Last data filed at 2022 0738  Gross per 24 hour   Intake 960 ml   Output 2560 ml   Net -1600 ml       Exam:  /89 (BP Location: Left arm, Patient Position: Lying)   Pulse 82   Temp 97.7 °F (36.5 °C) (Skin)   Resp 15   Ht 182.9 cm (72\")   Wt 117 kg (258 lb)   SpO2 96%   BMI 34.99 kg/m²     General Appearance:    Alert, cooperative, no distress, AAOx3, wearing CPAP upon entering room                         Throat:   Oral mucosa pink and moist                           Neck:   " No JVD                         Lungs:    Decreased at bases otherwise clear to auscultation bilaterally, respirations unlabored                 Chest Wall:    No tenderness or deformity                          Heart:    Regular rate and rhythm, S1 and S2 normal                  Abdomen:     Soft, nontender, bowel sounds active, no masses, no organomegaly                  Extremities:   Left lower extremity/stump surgically wrapped    Data Review:  Labs in chart were reviewed.    Assessment:  Active Hospital Problems    Diagnosis  POA   • **Osteomyelitis of great toe of left foot (HCC) [M86.9]  Yes   • WEN (generalized anxiety disorder) [F41.1]  Yes   • Type 2 diabetes mellitus with diabetic polyneuropathy, without long-term current use of insulin (HCC) [E11.42]  Yes   • Obstructive sleep apnea syndrome [G47.33]  Yes   • Obesity (BMI 30-39.9) [E66.9]  Yes   • Benign essential hypertension [I10]  Yes   • Hypercholesterolemia [E78.00]  Yes      Resolved Hospital Problems   No resolved problems to display.       Plan:    DM2 with circulatory disorder/PVD/PAF/foot ulcer with probable CKD involvement and with subsequent osteomyelitis status post left BKA 2022 per Dr. Jain              -Metformin on hold -some GI aversion secondary to narcotics/Oxy              -Sliding scale and okay for home use of Ozempic            No further fever noted.  Further pain-medical management of osteomyelitis per orthopedics.       ZENA okay for home use of CPAP     HTN stable     HLD on Crestor        LHA to follow a bit more peripherally while inpatient -disposition per orthopedics    Nuno Gallardo MD  2022  09:43 EST      Electronically signed by Nuno Gallardo MD at 22 0942     Enrique Jain MD at 22 1231          ORTHOPAEDIC/MUSCULOSKELETAL ONCOLOGIC PROGRESS NOTE  [facility]  Enrique Jain M.D.    Patient Identification:  Name: Montez Guy  :  1970  MRN:  "[id number]        Subjective:   Patient is doing well this morning.  States that overnight he had a febrile episode.  Currently his pain is better controlled than yesterday still having an IV requirement.  Still experiencing some nausea no emesis.  Normal bladder function.  No BM yet this morning.      Allergies:  No Known Allergies      Objective:  tMax 24 hrs: Temp (24hrs), Av °F (37.8 °C), Min:97.9 °F (36.6 °C), Max:102.1 °F (38.9 °C)    Vitals Ranges:   Temp:  [97.9 °F (36.6 °C)-102.1 °F (38.9 °C)] 98.7 °F (37.1 °C)  Heart Rate:  [] 80  Resp:  [16-18] 16  BP: (131-151)/(79-88) 138/88  Intake and Output Last 3 Shifts:   I/O last 3 completed shifts:  In: 1200 [P.O.:1200]  Out: 4380 [Urine:4380]    Physical Exam:  /88 (BP Location: Left arm, Patient Position: Lying)   Pulse 80   Temp 98.7 °F (37.1 °C) (Oral)   Resp 16   Ht 182.9 cm (72\")   Wt 117 kg (258 lb)   SpO2 96%   BMI 34.99 kg/m²     General Appearance:    Alert, cooperative, no distress, appears stated age   HEENT:    Normocephalic, atraumatic. Sclerae anicteric.  Mucous         membranes moist.   Lungs:     Breathing unlabored on room air   Focused MSK:  Examination of the left lower extremity demonstrates an incision without signs of infection.       Assessment:  Montez Guy is a 51 y.o. male who presents with diabetic neuropathy, chronic wounds involving the left great toe and small toe with osteomyelitis.  Patient is postoperative day 2 status post below-knee amputation.  Current problems on hospital day #3 include:  1.  Fever  2.  Pain  3.  Acute blood loss anemia  4.  Diabetes  5.  Hypertension    Medical decision-makin.  Regarding the patient's fever: I feel this is related to atelectasis.  I encouraged the patient's to use his incentive spirometer more frequently and if tolerated to sit up and mobilize with assistance to the chair.  If febrile once more, would recommend blood cultures.  Will consider antibiotics and " infectious disease consult if he spikes a temperature again  2.  As far as his pain, will continue with his current regimen with a plan to increase his MS Contin to 30 mg twice daily.  3.  Recent CBC demonstrates an increase in the patient's hematocrit.  Patient remains hemodynamically stable.  There is no need for transfusion at this time  4.  DVT prophylaxis continue low molecular weight heparin  5.  Diabetes and hypertension appreciate the hospitalist involvement in management of this and other medical comorbidities.  6.  Continue physical therapy, nonweightbearing left lower extremity  7.  Ice and elevation to help with pain and swelling  8.  Awaiting placement    Enrique Jain M.D.  2022    Electronically signed by Enrique Jain MD at 22 1239     Nuno Gallardo MD at 22 0819              DAILY PROGRESS NOTE  [facility]    Patient Identification:  Name: Montez Guy  Age: 51 y.o.  Sex: male  :  1970  MRN: [id number]         Primary Care Physician: Jeremiah Sanchez MD    Subjective:  Interval History: Febrile last night just after dinner and my NP was notified and she ordered sepsis evaluation.  His pro calcitonin lactate and WBC are within normal limits.  He was not trying to rest at the time of the fever so I do not think this is an issue of ZENA compounding things though he is at an increased risk for atelectasis.  He is also on Lovenox for DVT prophylaxis which makes the idea of clotting/DVT much less likely.  He feels much better this morning and is not febrile but he has some nausea without emesis.    Objective: Clinically does not appear toxic.  Conversational and pleasant.  No family at bedside      Vital signs in last 24 hours:  Temp:  [97.9 °F (36.6 °C)-102.1 °F (38.9 °C)] 98.9 °F (37.2 °C)  Heart Rate:  [] 84  Resp:  [16-18] 16  BP: (131-151)/(79-84) 131/84    Intake/Output:    Intake/Output Summary (Last 24 hours) at 2022 0820  Last data  "filed at 1/8/2022 0700  Gross per 24 hour   Intake 600 ml   Output 2750 ml   Net -2150 ml       Exam:  /84 (BP Location: Left arm, Patient Position: Lying)   Pulse 84   Temp 98.9 °F (37.2 °C) (Oral)   Resp 16   Ht 182.9 cm (72\")   Wt 117 kg (258 lb)   SpO2 94%   BMI 34.99 kg/m²     General Appearance:    Alert, cooperative, AAOx3                          Head:    Normocephalic, without obvious abnormality, atraumatic                           Eyes:    Conjunctivae/corneas clear, EOM's intact, both eyes                         Throat:   Oral mucosa pink and moist                           Neck:   Supple, no meningismus or JVD                         Lungs:    Mild decrease at bases otherwise clear to auscultation bilaterally, respirations unlabored                 Chest Wall:    No tenderness or deformity                          Heart:    Regular rate and rhythm, S1 and S2 normal                  Abdomen:     Soft, nontender, bowel sounds active                 Extremities: Left lower extremity remains wrapped                 Neurologic:   CNII-XII intact, moving all     Data Review:  Labs in chart were reviewed.    Assessment:  Active Hospital Problems    Diagnosis  POA   • **Osteomyelitis of great toe of left foot (HCC) [M86.9]  Yes   • WEN (generalized anxiety disorder) [F41.1]  Yes   • Type 2 diabetes mellitus with diabetic polyneuropathy, without long-term current use of insulin (HCC) [E11.42]  Yes   • Obstructive sleep apnea syndrome [G47.33]  Yes   • Obesity (BMI 30-39.9) [E66.9]  Yes   • Benign essential hypertension [I10]  Yes   • Hypercholesterolemia [E78.00]  Yes      Resolved Hospital Problems   No resolved problems to display.       Plan:    DM2 with circulatory disorder/PVD/PAF/foot ulcer with probable CKD involvement and with subsequent osteomyelitis status post left BKA 1/6/2022 per Dr. Jain              -Metformin on hold              -Sliding scale and okay for home use of Ozempic     "        Fever overnight to a T-max of 102.1    -I would suggest empiric antibiotics given past history of MRSA and would suggest vancomycin and Zosyn for additional coverage secondary to the diabetes.   -He is admitted to the surgical team and will defer management of antibiotics to them since they are managing the osteomyelitis surgically but I would also be happy to empirically dose.  May need to consider ID consultation for chronic antibiotic guidance   -Do not see need for chest x-ray and UA at this time   -Postoperative acute blood loss mild with Hgb at 11.6     ZENA okay for home use of CPAP     HTN stable -okay for losartan     HLD on Crestor        LHA to follow for medical management    Nuno Gallardo MD  2022  08:20 EST      Electronically signed by Nuno Gallardo MD at 22 0891     Enrique Jain MD at 22 1057          ORTHOPAEDIC/MUSCULOSKELETAL ONCOLOGIC PROGRESS NOTE  [facility]  Enrique Jain M.D.    Patient Identification:  Name: Montez Guy  :  1970  MRN: [id number]        Subjective:   No acute events.  Block wore off earlier this morning.  He had a significant pain requirement at that time.  Has been experiencing some nausea.  No emesis.  Denies any chest pain, shortness of breath,.  Has been up and out of bed with physical therapy.  States that he felt a little fatigued and lightheaded while doing so but overall doing well.    Allergies:  No Known Allergies      Objective:  tMax 24 hrs: Temp (24hrs), Av.7 °F (36.5 °C), Min:97 °F (36.1 °C), Max:98.9 °F (37.2 °C)    Vitals Ranges:   Temp:  [97 °F (36.1 °C)-98.9 °F (37.2 °C)] 97 °F (36.1 °C)  Heart Rate:  [67-81] 70  Resp:  [14-18] 18  BP: (118-131)/(72-92) 125/75  Intake and Output Last 3 Shifts:   I/O last 3 completed shifts:  In: 2556.7 [P.O.:840; I.V.:1516.7; IV Piggyback:200]  Out: 1630 [Urine:1630]    Physical Exam:  /75 (BP Location: Left arm, Patient Position:  Lying)   Pulse 70   Temp 97 °F (36.1 °C) (Oral)   Resp 18   SpO2 96%     General Appearance:    Alert, cooperative, no distress, appears stated age   HEENT:    Normocephalic, atraumatic. Sclerae anicteric.  Mucous         membranes moist.   Cardio:   Regular rate and rhythm.   Lungs:     Breathing unlabored on room air   Focused MSK:  Examination of the left residual limb demonstrates healing incision no purulence mild bleeding.  The dressing was bled through on the lateral aspect.       Data Review:  Lab Results (last 24 hours)     Procedure Component Value Units Date/Time    Basic Metabolic Panel [[id number]]  (Abnormal) Collected: 01/07/22 0616    Specimen: Blood Updated: 01/07/22 0744     Glucose 129 mg/dL      BUN 13 mg/dL      Creatinine 1.19 mg/dL      Sodium 139 mmol/L      Potassium 3.9 mmol/L      Chloride 105 mmol/L      CO2 25.0 mmol/L      Calcium 8.4 mg/dL      eGFR Non African Amer 65 mL/min/1.73      BUN/Creatinine Ratio 10.9     Anion Gap 9.0 mmol/L     Narrative:      GFR Normal >60  Chronic Kidney Disease <60  Kidney Failure <15      Hemoglobin & Hematocrit, Blood [[id number]]  (Abnormal) Collected: 01/07/22 0616    Specimen: Blood Updated: 01/07/22 0723     Hemoglobin 10.7 g/dL      Hematocrit 32.6 %     POC Glucose Once [[id number]]  (Normal) Collected: 01/07/22 0608    Specimen: Blood Updated: 01/07/22 0609     Glucose 121 mg/dL      Comment: Meter: [id number] : [id number] Juarez Clara NA       POC Glucose Once [[id number]]  (Abnormal) Collected: 01/06/22 2054    Specimen: Blood Updated: 01/06/22 2055     Glucose 171 mg/dL      Comment: Meter: [id number] : [id number] Juarez Clara NA       POC Glucose Once [[id number]]  (Abnormal) Collected: 01/06/22 1604    Specimen: Blood Updated: 01/06/22 1606     Glucose 270 mg/dL      Comment: Meter: [id number] : [id number] Arjun Grace NA       POC Glucose Once [[id number]]  (Abnormal) Collected: 01/06/22 1145    Specimen: Blood Updated: 01/06/22  1151     Glucose 147 mg/dL      Comment: Meter: [id number] : [id number] Lm Valenzuela RN               Assessment:  Montez Guy is a 51 y.o. male who presents with chronic wounds and osteomyelitis of the left great and small toe.  Status post left below-knee amputation, postoperative day 1.    Postoperative issues at this time:   1.   pain  2.  Acute blood loss anemia  3.  Diabetes  4.  Hypertension.      Medical decision-makin.  We will adjust pain medications.  We will discontinue Percocet and add Roxicodone 10 mg every 4 hours as needed, 1000 mg acetaminophen every 8 hours.  Will assess and see how the patient does with this treatment regimen.  Give consideration to MS Contin 15 mg twice daily if the patient still requires more pain medications.  2.  Regarding acute blood loss anemia, patient had low blood loss intraoperatively not factoring in the blood loss from of the amputated limb.  He is currently hemodynamically stable.  Will monitor.  3.  Appreciate the hospitalist input and management of the patient's diabetes and hypertension.  4.  Continue low molecular weight heparin for DVT prophylaxis.  5.  Continue physical therapy while in the hospital nonweightbearing left lower extremity  6.  Ice and elevation to help with swelling and pain  7.  Discharge disposition: Awaiting placement.  Provided the patient's pain is well tolerated with oral medications he may be discharged to rehab as soon as a bed is available.      Enrique Jain M.D.  2022    Electronically signed by Enrique Jain MD at 22 1102     Nuno Gallardo MD at 22 0887              DAILY PROGRESS NOTE  [facility]    Patient Identification:  Name: Montez Guy  Age: 51 y.o.  Sex: male  :  1970  MRN: [id number]         Primary Care Physician: Jeremiah Sanchez MD    Subjective:  Interval History: Some postoperative nausea but that is getting better today.  He is tolerating p.o. intake  with no issue.  His breakfast tray is empty.  He is conversational and pleasant.  Does not appear toxic in appearance.  Denies any chest pain shortness of breath confusion    Objective: No family at bedside.        Vital signs in last 24 hours:  Temp:  [97 °F (36.1 °C)-98.9 °F (37.2 °C)] 97 °F (36.1 °C)  Heart Rate:  [67-81] 70  Resp:  [14-18] 18  BP: (118-131)/(72-92) 125/75    Intake/Output:    Intake/Output Summary (Last 24 hours) at 1/7/2022 0829  Last data filed at 1/7/2022 0555  Gross per 24 hour   Intake 2556.67 ml   Output 1330 ml   Net 1226.67 ml       Exam:  /75 (BP Location: Left arm, Patient Position: Lying)   Pulse 70   Temp 97 °F (36.1 °C) (Oral)   Resp 18   SpO2 96%     General Appearance:    Alert, cooperative, AAOx3                          Head:    Normocephalic, without obvious abnormality, atraumatic                           Eyes:    Conjunctivae/corneas clear, EOM's intact, both eyes                         Throat:   Oral mucosa pink and moist                           Neck:   Supple, symmetrical, trachea midline, no JVD                         Lungs:    Clear to auscultation bilaterally, respirations unlabored                 Chest Wall:    No tenderness or deformity                          Heart:    Regular rate and rhythm, S1 and S2 normal                  Abdomen:     Soft, nontender, bowel sounds active                 Extremities: Old right BKA now with left BKA                  Neurologic:   CNII-XII intact, moving all     Data Review:  Labs in chart were reviewed.    Assessment:  Active Hospital Problems    Diagnosis  POA   • **Osteomyelitis of great toe of left foot ([facility]) [M86.9]  Yes   • WEN (generalized anxiety disorder) [F41.1]  Yes   • Type 2 diabetes mellitus with diabetic polyneuropathy, without long-term current use of insulin ([facility]) [E11.42]  Yes   • Obstructive sleep apnea syndrome [G47.33]  Yes   • Obesity (BMI 30-39.9) [E66.9]  Yes   • Benign essential hypertension  [I10]  Yes   • Hypercholesterolemia [E78.00]  Yes      Resolved Hospital Problems   No resolved problems to display.       Plan:    DM2 with circulatory disorder/PVD/PAF/foot ulcer with probable CKD involvement and with subsequent osteomyelitis status post left BKA 1/6/2022 per Dr. Jain   -Metformin on hold   -Sliding scale and okay for home use of Ozempic   -Current -171 with a max of 270    ZENA okay for home use of CPAP    HTN stable -hold ARB another day    HLD on Crestor      [facility] to follow for medical management    Nuno Gallardo MD  1/7/2022  08:29 EST      Electronically signed by Nuno Gallardo MD at 01/07/22 0834          Consult Notes       Edi Lopes MD at 01/06/22 1538      Consult Orders    1. Inpatient Hospitalist Consult [[id number]] ordered by Enrique Jain MD at 01/06/22 1341                   Patient Name:  Montez Guy  YOB: 1970  MRN:  [id number]  Admit Date:  1/6/2022  Patient Care Team:  Jeremiah Sanchez MD as PCP - General (Hospitalist)  Karen Beauchamp MD as Consulting Physician (Pulmonary Disease)  Karen Beauchamp MD as Consulting Physician (Pulmonary Disease)      Subjective   History Present Illness   Referring Provider: Dr. Enrique Jain  Reason for Consultation: hypertension, type 2 DM, ZENA      Mr. Guy is a 51 y.o. former smoker with a history of HTN, HLD, depression, anxiety, and type 2 DM with peripheral neuropathy complicated by multiple diabetic foot ulcers with osteomyelitis resulting in a right below-the-knee amputation. He has had the same issues in his left lower extremity and is now admitted to the orthopedic service at [facility] for definitive management which was a left below-the-knee amputation done on 1/6/22. I was consulted for evaluation and inpatient management of his medical problems including hypertension, type 2 DM, and ZENA. He states he is very compliant with his CPAP and he has brought this with him. He has had  decent blood glucose control at home-was previously on insulin but now takes metformin and ozempic, the latter of which is due tomorrow.      History of Present Illness  Review of Systems   Constitutional: Negative for appetite change, chills and fever.   HENT: Negative for congestion, sore throat and trouble swallowing.    Eyes: Negative for redness and visual disturbance.   Respiratory: Negative for cough, choking and shortness of breath.    Cardiovascular: Negative for chest pain and palpitations.   Gastrointestinal: Negative for abdominal pain, constipation, diarrhea, nausea and vomiting.   Endocrine: Negative for cold intolerance and heat intolerance.   Genitourinary: Negative for difficulty urinating and dysuria.   Musculoskeletal: Negative for arthralgias and neck pain.   Skin: Negative for pallor and rash.   Neurological: Negative for dizziness, light-headedness and headaches.   Hematological: Negative for adenopathy. Does not bruise/bleed easily.   Psychiatric/Behavioral: Negative for confusion and decreased concentration.          No Known Allergies    Objective    Objective     Vital Signs  Temp:  [97.5 °F (36.4 °C)-98.9 °F (37.2 °C)] 97.5 °F (36.4 °C)  Heart Rate:  [67-99] 67  Resp:  [13-16] 16  BP: (118-135)/(72-94) 118/72  SpO2:  [95 %-99 %] 98 %  on  Flow (L/min):  [2-4] 2;   Device (Oxygen Therapy): nasal cannula  There is no height or weight on file to calculate BMI.    Physical Exam  Vitals and nursing note reviewed.   Constitutional:       General: He is not in acute distress.     Appearance: He is not toxic-appearing or diaphoretic.   HENT:      Head: Normocephalic and atraumatic.      Nose: Nose normal.      Mouth/Throat:      Mouth: Mucous membranes are moist.      Pharynx: Oropharynx is clear.   Eyes:      Extraocular Movements: Extraocular movements intact.      Conjunctiva/sclera: Conjunctivae normal.      Pupils: Pupils are equal, round, and reactive to light.   Cardiovascular:      Rate  and Rhythm: Normal rate and regular rhythm.   Pulmonary:      Effort: Pulmonary effort is normal.      Breath sounds: Normal breath sounds.   Abdominal:      General: Bowel sounds are normal.      Palpations: Abdomen is soft.      Tenderness: There is no abdominal tenderness.   Musculoskeletal:         General: No swelling.      Cervical back: Normal range of motion and neck supple.      Comments: Previous right BKA well-healed  Recent Left BKA dressed and braced with DONNIE drain scant sanguinous drainage   Skin:     General: Skin is warm and dry.      Capillary Refill: Capillary refill takes less than 2 seconds.   Neurological:      General: No focal deficit present.      Mental Status: He is alert and oriented to person, place, and time.   Psychiatric:         Mood and Affect: Mood normal.         Behavior: Behavior normal.         Results Review:  I reviewed the patient's new clinical results.  I reviewed the patient's new imaging results and agree with the interpretation.  I reviewed the patient's other test results and agree with the interpretation      Assessment/Plan     Active Hospital Problems    Diagnosis  POA   • **Osteomyelitis of great toe of left foot ([facility]) [M86.9]  Yes   • WEN (generalized anxiety disorder) [F41.1]  Yes   • Type 2 diabetes mellitus with diabetic polyneuropathy, without long-term current use of insulin ([facility]) [E11.42]  Yes   • Obstructive sleep apnea syndrome [G47.33]  Yes   • Obesity (BMI 30-39.9) [E66.9]  Yes   • Benign essential hypertension [I10]  Yes   • Hypercholesterolemia [E78.00]  Yes      Resolved Hospital Problems   No resolved problems to display.   Diabetic Foot Ulcer with Osteomyelitis of the Left Foot  - recurrent issue, now s/p left BKA 1/6/22  - postoperative management per primary team  - encourage incentive spirometry, bowel regimen    Type 2 DM  - complications include peripheral neuropathy and foot ulcers with osteomyelitis  - hold metformin  - cover with  ssi/hypoglycemia protocol moderate dose  - he has requested to take his home ozempic when it is due tomorrow and this is fine-I have ordered  - on lyrica for peripheral neuropathy  - check A1C    ZENA  - patient has home CPAP    Hypertension  - BP is low normal  - will hold losartan for now to avoid postoperative hypotension    Hyperlipidemia  - on crestor    I discussed the patient's findings and my recommendations with patient, family and nursing staff.    VTE Prophylaxis - Lovenox 40 mg SC daily.       Edi Lopes MD  [facility]  01/06/22  16:54 EST    Electronically signed by Edi Lopes MD at 01/06/22 9151           Kavya Henson RN      Case Management   Case Management/Social Work   Signed   Date of Service:  01/10/22 1132   Creation Time:  01/10/22 1132              Signed            Continued Stay Note  [facility]     Patient Name: Montez Guy               MRN: [id number]  Today's Date: 1/10/2022                     Admit Date: 1/6/2022             Discharge Plan            Row Name 01/10/22 1131             Plan      Plan [facility] -- Accepted & Pre-cert Obtained.      Patient/Family in Agreement with Plan yes      Plan Comments Received a call from [facility] who obtained pre-cert and has a rehab bed for the patient today. Patient will likely need transport arranged at d/c.

## 2022-01-10 NOTE — THERAPY TREATMENT NOTE
Patient Name: Montez Guy  : 1970    MRN: [id number]                              Today's Date: 1/10/2022       Admit Date: 2022    Visit Dx:     ICD-10-CM ICD-9-CM   1. Osteomyelitis of great toe of left foot (HCC)  M86.9 730.27   2. Open wound of fifth toe of left foot, initial encounter  S91.105A 893.0   3. Open wound of fifth toe of left foot, subsequent encounter  S91.105D V58.89     893.0     Patient Active Problem List   Diagnosis   • Cellulitis   • Pressure ulcer, ankle   • Benign essential hypertension   • Constipation, chronic   • Hypercholesterolemia   • Mood disorder ([facility])   • Obstructive sleep apnea syndrome   • Peyronie's disease   • Type 2 diabetes mellitus with diabetic polyneuropathy, without long-term current use of insulin ([facility])   • Vitamin D deficiency   • Open wound of fifth toe of left foot   • Obesity (BMI 30-39.9)   • Fever   • Streptococcal infection group A   • Pneumonia involving right lung   • Sepsis ([facility])   • Below-knee amputation of right lower extremity ([facility])   • MDD (major depressive disorder), recurrent episode, moderate ([facility])   • WEN (generalized anxiety disorder)   • Osteomyelitis of great toe of left foot ([facility])     Past Medical History:   Diagnosis Date   • Cellulitis of left lower extremity 2020   • Diabetes ([facility])    • History of coma     X 4 MONTHS   • History of weight loss    • Hypertension    • Impaired lung function     LEFT   • MRSA (methicillin resistant staph aureus) culture positive     RIGHT LEG- OSTEOMYELYTIS-SEPSIS-BLOOD LOSS   • Obesity (BMI 30-39.9) 2020   • Obstructive sleep apnea syndrome 2020    CPAP   • Osteomyelitis of great toe of left foot ([facility])    • Pneumonia 2014    necrotizing pneumonia     Past Surgical History:   Procedure Laterality Date   • BELOW KNEE AMPUTATION Left 2022    Procedure: LEFT BELOW KNEE AMPUTATION;  Surgeon: Enrique Jain MD;  Location: [facility];  Service: Orthopedics;  Laterality: Left;   •  BELOW KNEE LEG AMPUTATION Right    • DEBRIDEMENT LEG Right     MULTIPLE   • FLEXIBLE BRONCHOSCOPY W/ BRONCHOPULMONARY LAVAGE      MULTIPLE   • PILONIDAL CYSTECTOMY     • RHINOPLASTY     • TOE AMPUTATION     • TRACHEOSTOMY     • TRACHEOSTOMY CLOSURE/STOMA REVISION        General Information     Row Name 01/10/22 1158          Physical Therapy Time and Intention    Document Type therapy note (daily note)  -DB     Mode of Treatment physical therapy; co-treatment; occupational therapy  -DB     Row Name 01/10/22 1158          General Information    Patient Profile Reviewed yes  -DB           User Key  (r) = Recorded By, (t) = Taken By, (c) = Cosigned By    Initials Name Provider Type    DB Nanci Vasquez PT Physical Therapist               Mobility     Row Name 01/10/22 1158          Bed Mobility    Scooting/Bridging [place] (Bed Mobility) minimum assist (75% patient effort); 2 person assist  -DB     Supine-Sit [place] (Bed Mobility) standby assist; verbal cues  -DB     Sit-Supine [place] (Bed Mobility) minimum assist (75% patient effort); verbal cues  -DB     Assistive Device (Bed Mobility) bed rails; head of bed elevated; draw sheet  -DB     Row Name 01/10/22 1158          Transfers    Comment (Transfers) performed STS 2x today. Pt able to stand for ~ 30 sec before needing to sit down, became very nausous  -DB     Row Name 01/10/22 1158          Sit-Stand Transfer    Sit-Stand [place] (Transfers) minimum assist (75% patient effort); moderate assist (50% patient effort); 2 person assist; verbal cues  -DB     Assistive Device (Sit-Stand Transfers) walker, front-wheeled  -DB     Row Name 01/10/22 1158          Gait/Stairs (Locomotion)    [place] Level (Gait) unable to assess  -DB           User Key  (r) = Recorded By, (t) = Taken By, (c) = Cosigned By    Initials Name Provider Type    DB Nanci Vasquez PT Physical Therapist               Obj/Interventions     Row Name 01/10/22 1200           Balance    Balance Assessment sitting static balance; sitting dynamic balance; standing static balance  -DB     Static Sitting Balance WFL; sitting, edge of bed  -DB     Dynamic Sitting Balance WFL; sitting, edge of bed  -DB     Static Standing Balance WFL; supported  -DB     Balance Interventions sitting; standing; sit to stand  -DB           User Key  (r) = Recorded By, (t) = Taken By, (c) = Cosigned By    Initials Name Provider Type    Nanci Bose, PT Physical Therapist               Goals/Plan    No documentation.                Clinical Impression     Row Name 01/10/22 1200          Pain Scale: Numbers Pre/Post-Treatment    Pretreatment Pain Rating 7/10  -DB     Posttreatment Pain Rating 7/10  -DB     Pain Intervention(s) Ambulation/increased activity; Repositioned  -DB     Row Name 01/10/22 1200          Plan of Care Review    Plan of Care Reviewed With patient  -DB     Progress improving  -DB     Outcome Summary Pt agreeable to work with PT/OT to try to stand today. Pt limited with mobility 2/2 N/V. Able to perform 2 STS today with min/modA x2, tolerates standing for ~30 secoonds. Pt back in bed at end of session, RN in room to address N/V. Continues to benefit from therapy, will progress as able.  -DB     Row Name 01/10/22 1200          Vital Signs    O2 Delivery Pre Treatment room air  -DB     O2 Delivery Intra Treatment room air  -DB     O2 Delivery Post Treatment room air  -DB     Pre Patient Position Supine  -DB     Intra Patient Position Standing  -DB     Post Patient Position Supine  -DB     Row Name 01/10/22 1200          Positioning and Restraints    Pre-Treatment Position in bed  -DB     Post Treatment Position bed  -DB     In Bed supine; call light within reach; encouraged to call for assist; with nsg  -DB           User Key  (r) = Recorded By, (t) = Taken By, (c) = Cosigned By    Initials Name Provider Type    Nanci Bose, PT Physical Therapist               Outcome Measures      Row Name 01/10/22 1203          How much help from another person do you currently need...    Turning from your back to your side while in flat bed without using bedrails? 3  -DB     Moving from lying on back to sitting on the side of a flat bed without bedrails? 3  -DB     Moving to and from a bed to a chair (including a wheelchair)? 2  -DB     Standing up from a chair using your arms (e.g., wheelchair, bedside chair)? 2  -DB     Climbing 3-5 steps with a railing? 1  -DB     To walk in hospital room? 1  -DB     AM-PAC 6 Clicks Score (PT) 12  -DB     Row Name 01/10/22 1203          Functional Assessment    Outcome Measure Options AM-PAC 6 Clicks Basic Mobility (PT)  -DB           User Key  (r) = Recorded By, (t) = Taken By, (c) = Cosigned By    Initials Name Provider Type    Nanci Bose, PT Physical Therapist                             Physical Therapy Education                 Title: PT OT SLP Therapies (In Progress)     Topic: Physical Therapy (Done)     Point: Mobility training (Done)     Learning Progress Summary           Patient Acceptance, E, VU by DB at 1/10/2022 1204    Acceptance, E, VU by KH at 1/9/2022 1321    Acceptance, E, VU by KH at 1/8/2022 1532    Acceptance, E,D, VU,NR by MS at 1/7/2022 0925                   Point: Home exercise program (Done)     Learning Progress Summary           Patient Acceptance, E, VU by DB at 1/10/2022 1204    Acceptance, E, VU by KH at 1/9/2022 1321    Acceptance, E, VU by  at 1/8/2022 1532    Acceptance, E,D, VU,NR by MS at 1/7/2022 0925                   Point: Body mechanics (Done)     Learning Progress Summary           Patient Acceptance, E, VU by DB at 1/10/2022 1204    Acceptance, E, VU by KH at 1/9/2022 1321    Acceptance, E, VU by  at 1/8/2022 1532    Acceptance, E,D, VU,NR by MS at 1/7/2022 0925                   Point: Precautions (Done)     Learning Progress Summary           Patient Acceptance, E, VU by DB at 1/10/2022 1204    Acceptance, E, VU  by  at 1/9/2022 1321    Acceptance, E, VU by  at 1/8/2022 1532    Acceptance, E,D, VU,NR by MS at 1/7/2022 0925                               User Key     Initials Effective Dates Name Provider Type Discipline     06/16/21 -  Lynette Hinson, PT Physical Therapist PT    MS 06/16/21 -  Edi Anthony, PT Physical Therapist PT    DB 06/16/21 -  Nanci Vasquez PT Physical Therapist PT              PT Recommendation and Plan     Plan of Care Reviewed With: patient  Progress: improving  Outcome Summary: Pt agreeable to work with PT/OT to try to stand today. Pt limited with mobility 2/2 N/V. Able to perform 2 STS today with min/modA x2, tolerates standing for ~30 secoonds. Pt back in bed at end of session, RN in room to address N/V. Continues to benefit from therapy, will progress as able.     Time Calculation:    PT Charges     Row Name 01/10/22 1204             Time Calculation    Start Time 1004  -DB      Stop Time 1031  -DB      Time Calculation (min) 27 min  -DB      PT Received On 01/10/22  -DB      PT - Next Appointment 01/11/22  -DB              Time Calculation- PT    Total Timed Code Minutes- PT 23 minute(s)  -DB            User Key  (r) = Recorded By, (t) = Taken By, (c) = Cosigned By    Initials Name Provider Type    DB Nanci Vasquez, FENG Physical Therapist              Therapy Charges for Today     Code Description Service Date Service Provider Modifiers Qty    29390160778 HC PT THERAPEUTIC ACT EA 15 MIN 1/10/2022 Nanci Vasquez PT GP 2          PT G-Codes  Outcome Measure Options: AM-PAC 6 Clicks Basic Mobility (PT)  AM-PAC 6 Clicks Score (PT): 12  AM-PAC 6 Clicks Score (OT): 15    Nanci Vasquez PT  1/10/2022

## 2022-01-10 NOTE — CASE MANAGEMENT/SOCIAL WORK
Continued Stay Note  [facility]     Patient Name: Montez Guy  MRN: [id number]  Today's Date: 1/10/2022    Admit Date: 1/6/2022     Discharge Plan     Row Name 01/10/22 1048       Plan    Plan [facility] -- Accepted & Pre-cert Pending.    Patient/Family in Agreement with Plan yes    Plan Comments Spoke with [facility] who said pre-cert is still pending.               Discharge Codes    No documentation.               Expected Discharge Date and Time     Expected Discharge Date Expected Discharge Time    Alberto 10, 2022             Kavya Henson RN

## 2022-01-10 NOTE — THERAPY TREATMENT NOTE
Patient Name: Montez Guy  : 1970    MRN: [id number]                              Today's Date: 1/10/2022       Admit Date: 2022    Visit Dx:     ICD-10-CM ICD-9-CM   1. Osteomyelitis of great toe of left foot (HCC)  M86.9 730.27   2. Open wound of fifth toe of left foot, initial encounter  S91.105A 893.0   3. Open wound of fifth toe of left foot, subsequent encounter  S91.105D V58.89     893.0     Patient Active Problem List   Diagnosis   • Cellulitis   • Pressure ulcer, ankle   • Benign essential hypertension   • Constipation, chronic   • Hypercholesterolemia   • Mood disorder ([facility])   • Obstructive sleep apnea syndrome   • Peyronie's disease   • Type 2 diabetes mellitus with diabetic polyneuropathy, without long-term current use of insulin ([facility])   • Vitamin D deficiency   • Open wound of fifth toe of left foot   • Obesity (BMI 30-39.9)   • Fever   • Streptococcal infection group A   • Pneumonia involving right lung   • Sepsis ([facility])   • Below-knee amputation of right lower extremity ([facility])   • MDD (major depressive disorder), recurrent episode, moderate ([facility])   • WEN (generalized anxiety disorder)   • Osteomyelitis of great toe of left foot ([facility])     Past Medical History:   Diagnosis Date   • Cellulitis of left lower extremity 2020   • Diabetes ([facility])    • History of coma     X 4 MONTHS   • History of weight loss    • Hypertension    • Impaired lung function     LEFT   • MRSA (methicillin resistant staph aureus) culture positive     RIGHT LEG- OSTEOMYELYTIS-SEPSIS-BLOOD LOSS   • Obesity (BMI 30-39.9) 2020   • Obstructive sleep apnea syndrome 2020    CPAP   • Osteomyelitis of great toe of left foot ([facility])    • Pneumonia 2014    necrotizing pneumonia     Past Surgical History:   Procedure Laterality Date   • BELOW KNEE AMPUTATION Left 2022    Procedure: LEFT BELOW KNEE AMPUTATION;  Surgeon: Enrique Jain MD;  Location: [facility];  Service: Orthopedics;  Laterality: Left;   •  BELOW KNEE LEG AMPUTATION Right    • DEBRIDEMENT LEG Right     MULTIPLE   • FLEXIBLE BRONCHOSCOPY W/ BRONCHOPULMONARY LAVAGE      MULTIPLE   • PILONIDAL CYSTECTOMY     • RHINOPLASTY     • TOE AMPUTATION     • TRACHEOSTOMY     • TRACHEOSTOMY CLOSURE/STOMA REVISION        General Information     [facility] Name 01/10/22 1200          OT Time and Intention    Document Type therapy note (daily note)  -     Mode of Treatment physical therapy; co-treatment; occupational therapy  -[facility] Name 01/10/22 1200          General Information    Patient Profile Reviewed yes  -     Existing Precautions/Restrictions fall; brace worn when out of bed  new L BKA  -[facility] Name 01/10/22 1200          Cognition    Orientation Status (Cognition) oriented x 4  -[facility] Name 01/10/22 1200          Safety Issues, Functional Mobility    Impairments Affecting Function (Mobility) pain; strength; balance; endurance/activity tolerance  -           User Key  (r) = Recorded By, (t) = Taken By, (c) = Cosigned By    Initials Name Provider Type     Marybeth Rushing, OT Occupational Therapist                 Mobility/ADL's     [facility] Name 01/10/22 1201          Bed Mobility    Supine-Sit [place] (Bed Mobility) standby assist; verbal cues  -     Sit-Supine [place] (Bed Mobility) minimum assist (75% patient effort); verbal cues  -[facility] Name 01/10/22 1201          Transfers    Comment (Transfers) X2 sit to stands, static stand ~30 seconds, rest break between, pt feeling nausous and begins to vomit  -     Sit-Stand [place] (Transfers) minimum assist (75% patient effort); moderate assist (50% patient effort); 2 person assist; verbal cues  -[facility] Name 01/10/22 1201          Sit-Stand Transfer    Assistive Device (Sit-Stand Transfers) walker, front-wheeled  -[facility] Name 01/10/22 1201          Activities of Daily Living    BADL Assessment/Intervention lower body dressing  -[facility] Name 01/10/22 1201           Grooming Assessment/Training    [place] Level (Grooming) grooming skills; set up  -     Position (Grooming) sitting up in bed  -     Row Name 01/10/22 1201          Lower Body Dressing Assessment/Training    Comment (Lower Body Dressing) Pt dons R prothesis SBA EOB  -SM           User Key  (r) = Recorded By, (t) = Taken By, (c) = Cosigned By    Initials Name Provider Type    Marybeth Saxena OT Occupational Therapist               Obj/Interventions     Row Name 01/10/22 1203          Balance    Static Sitting Balance WFL; sitting, edge of bed  -     Static Standing Balance mild impairment; supported  -SM     Comment, Balance stands at rwx CGA 2 attempts ~30 seconds.  -SM           User Key  (r) = Recorded By, (t) = Taken By, (c) = Cosigned By    Initials Name Provider Type     Marybeth Rushing OT Occupational Therapist               Goals/Plan    No documentation.                Clinical Impression     Row Name 01/10/22 1203          Pain Scale: Numbers Pre/Post-Treatment    Pretreatment Pain Rating 7/10  -SM     Posttreatment Pain Rating 7/10  -SM     Pain Location - Side Left  -SM     Pain Location residual limb  -SM     Row Name 01/10/22 1203          Plan of Care Review    Plan of Care Reviewed With patient  -SM     Outcome Summary Pt seen today for OT, co treat completed with PT. Pt able to progress to standing this date, he is up to EOB SBA and dons RLE prothesis. He is able to stand twice Min/Mod AX2. He became very nausea afterwards and vomitting sitting EOB. RN notifed and in room, pt put back to bed, min A to return self to supine. Plans to dc to rehab soon.  -     Row Name 01/10/22 1203          Therapy Plan Review/Discharge Plan (OT)    Anticipated Discharge Disposition (OT) inpatient rehabilitation facility  -     Row Name 01/10/22 1203          Positioning and Restraints    Pre-Treatment Position in bed  -SM     Post Treatment Position bed  -SM     In Bed fowlers; call light  within reach; encouraged to call for assist; notified ns  -           User Key  (r) = Recorded By, (t) = Taken By, (c) = Cosigned By    Initials Name Provider Type    Marybeth Saxena OT Occupational Therapist               Outcome Measures     Row Name 01/10/22 1209          How much help from another is currently needed...    Putting on and taking off regular lower body clothing? 2  -SM     Bathing (including washing, rinsing, and drying) 2  -SM     Toileting (which includes using toilet bed pan or urinal) 2  -SM     Putting on and taking off regular upper body clothing 3  -SM     Taking care of personal grooming (such as brushing teeth) 3  -SM     Eating meals 4  -SM     AM-PAC 6 Clicks Score (OT) 16  -SM     Row Name 01/10/22 1203          How much help from another person do you currently need...    Turning from your back to your side while in flat bed without using bedrails? 3  -DB     Moving from lying on back to sitting on the side of a flat bed without bedrails? 3  -DB     Moving to and from a bed to a chair (including a wheelchair)? 2  -DB     Standing up from a chair using your arms (e.g., wheelchair, bedside chair)? 2  -DB     Climbing 3-5 steps with a railing? 1  -DB     To walk in hospital room? 1  -DB     AM-PAC 6 Clicks Score (PT) 12  -DB     Row Name 01/10/22 1209 01/10/22 1203       Functional Assessment    Outcome Measure Options AM-PAC 6 Clicks Daily Activity (OT)  - AM-PAC 6 Clicks Basic Mobility (PT)  -DB          User Key  (r) = Recorded By, (t) = Taken By, (c) = Cosigned By    Initials Name Provider Type    DB Nanci Vasquez, PT Physical Therapist    Marybeth Saxena OT Occupational Therapist                Occupational Therapy Education                 Title: PT OT SLP Therapies (In Progress)     Topic: Occupational Therapy (In Progress)     Point: ADL training (Done)     Description:   Instruct learner(s) on proper safety adaptation and remediation techniques during self  care or transfers.   Instruct in proper use of assistive devices.              Learning Progress Summary           Patient Acceptance, E, VU by  at 1/7/2022 0127    Comment: Role of OT and POC/goals.                   Point: Home exercise program (Not Started)     Description:   Instruct learner(s) on appropriate technique for monitoring, assisting and/or progressing therapeutic exercises/activities.              Learner Progress:  Not documented in this visit.          Point: Precautions (Not Started)     Description:   Instruct learner(s) on prescribed precautions during self-care and functional transfers.              Learner Progress:  Not documented in this visit.          Point: Body mechanics (Not Started)     Description:   Instruct learner(s) on proper positioning and spine alignment during self-care, functional mobility activities and/or exercises.              Learner Progress:  Not documented in this visit.                      User Key     Initials Effective Dates Name Provider Type Discipline     04/02/20 -  Marybeth Rushing OT Occupational Therapist OT              OT Recommendation and Plan  Therapy Frequency (OT): 5 times/wk  Plan of Care Review  Plan of Care Reviewed With: patient  Outcome Summary: Pt seen today for OT, co treat completed with PT. Pt able to progress to standing this date, he is up to EOB SBA and dons RLE prothesis. He is able to stand twice Min/Mod AX2. He became very nausea afterwards and vomitting sitting EOB. RN notifed and in room, pt put back to bed, min A to return self to supine. Plans to dc to rehab soon.     Time Calculation:    Time Calculation- OT     Row Name 01/10/22 1210             Time Calculation- OT    OT Start Time 1004  -      OT Stop Time 1030  -      OT Time Calculation (min) 26 min  -      Total Timed Code Minutes- OT 26 minute(s)  -      OT Received On 01/10/22  -              Timed Charges    69469 - OT Therapeutic Activity Minutes 26  -               Total Minutes    Timed Charges Total Minutes 26  -SM       Total Minutes 26  -SM            User Key  (r) = Recorded By, (t) = Taken By, (c) = Cosigned By    Initials Name Provider Type    Marybeth Saxena OT Occupational Therapist              Therapy Charges for Today     Code Description Service Date Service Provider Modifiers Qty    [id number]  OT THERAPEUTIC ACT EA 15 MIN 1/10/2022 Marybeth Rushing OT GO 2               Marybeth Rushing OT  1/10/2022

## 2022-01-10 NOTE — PLAN OF CARE
Goal Outcome Evaluation:  Plan of Care Reviewed With: patient        Progress: improving  Outcome Summary: POD#4 OF LEFT BKA. DRESSING DRY/INTACT. VSS. PO PAIN MEDICATION HELPING WITH PAIN. UP ASSISTX2. BM X1. VOIDING FINE PER URINAL. BKA ALSO ON THE RIGHT. EDUCATION PROVIDED BLOOD SUGAR MONITORING AND NAUSEA. WILL CONTINUE TO MONITOR.

## 2022-01-10 NOTE — CASE MANAGEMENT/SOCIAL WORK
Continued Stay Note  [facility]     Patient Name: Montez Guy  MRN: [id number]  Today's Date: 1/10/2022    Admit Date: 1/6/2022     Discharge Plan     Row Name 01/10/22 1131       Plan    Plan [facility] -- Accepted & Pre-cert Obtained.    Patient/Family in Agreement with Plan yes    Plan Comments Received a call from [facility] who obtained pre-cert and has a rehab bed for the patient today. Patient will likely need transport arranged at d/c.    Row Name 01/10/22 1048       Plan    Plan [facility] -- Accepted & Pre-cert Pending.    Patient/Family in Agreement with Plan yes    Plan Comments Spoke with [facility] who said pre-cert is still pending.               Discharge Codes    No documentation.               Expected Discharge Date and Time     Expected Discharge Date Expected Discharge Time    Jan 11, 2022             Kavya Henson RN

## 2022-01-10 NOTE — PROGRESS NOTES
ORTHOPAEDIC/MUSCULOSKELETAL ONCOLOGIC PROGRESS NOTE  [facility]  Enrique Jain M.D.    Patient Identification:  Name: Montez uGy  :  1970  MRN: [id number]        Subjective:   Did well overnight.  Nauseous when he was working with physical therapy today with emesis.  Denies chest pain, shortness of breath, states that he has been having night chills.  Has been afebrile.  States that his pain is well controlled.  Has had a bowel movement.      Current Meds:   Current Facility-Administered Medications   Medication Dose Route Frequency Provider Last Rate Last Admin   • acetaminophen (TYLENOL) tablet 1,000 mg  1,000 mg Oral Q8H Enrique Jain MD   1,000 mg at 01/10/22 0644   • sennosides-docusate (PERICOLACE) 8.6-50 MG per tablet 2 tablet  2 tablet Oral BID Enrique Jain MD   2 tablet at 01/10/22 0845    And   • polyethylene glycol (MIRALAX) packet 17 g  17 g Oral Daily PRN Enrique Jain MD   17 g at 22 1752    And   • bisacodyl (DULCOLAX) EC tablet 5 mg  5 mg Oral Daily PRN Enrique Jain MD   5 mg at 22 0822    And   • bisacodyl (DULCOLAX) suppository 10 mg  10 mg Rectal Daily PRN Enrique Jain MD   10 mg at 22 1608   • budesonide-formoterol (SYMBICORT) 160-4.5 MCG/ACT inhaler 1 puff  1 puff Inhalation BID PRN Enrique Jain MD       • cholecalciferol (VITAMIN D3) tablet 400 Units  400 Units Oral Daily Enrique Jain MD   400 Units at 01/10/22 0845   • dextrose (D50W) (25 g/50 mL) IV injection 25 g  25 g Intravenous Q15 Min PRN Edi Lopes MD       • dextrose (GLUTOSE) oral gel 15 g  15 g Oral Q15 Min PRN Edi Lopes MD       • enoxaparin (LOVENOX) syringe 40 mg  40 mg Subcutaneous Q24H Enrique Jain MD   40 mg at 22 2334   • famotidine (PEPCID) tablet 40 mg  40 mg Oral Daily Enrique Jain MD   40 mg at 01/10/22 8768   • glucagon (human recombinant) (GLUCAGEN DIAGNOSTIC) injection 1 mg  1 mg Subcutaneous Q15 Min PRN Moses,  Edi WYATT MD       • insulin lispro (ADMELOG) injection 0-9 Units  0-9 Units Subcutaneous TID AC Edi Lopes MD   2 Units at 22 1227   • lamoTRIgine (LaMICtal) tablet 25 mg  25 mg Oral BID Enrique Jain MD   25 mg at 01/10/22 0845   • losartan (COZAAR) tablet 50 mg  50 mg Oral Q24H Nuno Gallardo MD   50 mg at 01/10/22 0845   • modafinil (PROVIGIL) tablet 100 mg  100 mg Oral Daily Enrique Jain MD   100 mg at 01/10/22 0845   • Morphine (MS CONTIN) 12 hr tablet 15 mg  15 mg Oral Q12H Enrique Jain MD   15 mg at 01/10/22 0845   • naloxone (NARCAN) injection 0.4 mg  0.4 mg Intravenous Q5 Min PRN Enrique Jain MD       • ondansetron (ZOFRAN) tablet 4 mg  4 mg Oral Q6H PRN Enrique Jain MD   4 mg at 01/10/22 0851    Or   • ondansetron (ZOFRAN) injection 4 mg  4 mg Intravenous Q6H PRN Enrique Jain MD   4 mg at 22 2019   • oxyCODONE (ROXICODONE) immediate release tablet 10 mg  10 mg Oral Q4H PRN Enrique Jain MD   10 mg at 01/10/22 0741   • pregabalin (LYRICA) capsule 50 mg  50 mg Oral Daily Enrique Jain MD   50 mg at 01/10/22 0845   • rosuvastatin (CRESTOR) tablet 40 mg  40 mg Oral Daily Enrique Jain MD   40 mg at 01/10/22 0847   • Semaglutide(0.25 or 0.5MG/DOS) (OZEMPIC) solution pen-injector 0.25 mg  0.25 mg Subcutaneous Weekly Edi Lopes MD   0.25 mg at 22 7268   • sodium chloride 0.9 % infusion  75 mL/hr Intravenous Continuous Enrique Jain MD           Allergies:  No Known Allergies      Objective:  tMax 24 hrs: Temp (24hrs), Av.4 °F (36.3 °C), Min:96.9 °F (36.1 °C), Max:98.3 °F (36.8 °C)    Vitals Ranges:   Temp:  [96.9 °F (36.1 °C)-98.3 °F (36.8 °C)] 98.3 °F (36.8 °C)  Heart Rate:  [78-97] 93  Resp:  [16-18] 18  BP: (120-139)/(75-82) 124/75  Intake and Output Last 3 Shifts:   I/O last 3 completed shifts:  In: 480 [P.O.:480]  Out: 2460 [Urine:2460]    Physical Exam:  /75 (BP Location: Left arm, Patient Position: Lying)   Pulse 93   Temp 98.3  "°F (36.8 °C) (Oral)   Resp 18   Ht 182.9 cm (72\")   Wt 117 kg (258 lb)   SpO2 95%   BMI 34.99 kg/m²     General Appearance:    Alert, cooperative, no distress, appears stated age   HEENT:    Normocephalic, atraumatic. Sclerae anicteric.  Mucous         membranes moist.   Lungs:     Breathing unlabored on room air   Abdomen:     Soft, nontender, nondistended.   Focused MSK:  Left residual limb still with swelling no erythema mild serous drainage medially there is a dark area on the posterior aspect of the flap which measures less than 1 cm       Data Review:  Lab Results (last 24 hours)     Procedure Component Value Units Date/Time    POC Glucose Once [[id number]]  (Abnormal) Collected: 01/10/22 1044    Specimen: Blood Updated: 01/10/22 1045     Glucose 179 mg/dL      Comment: Meter: [id number] : [id number] Viviana ARMIJO       Tissue Pathology Exam [[id number]] Collected: 01/06/22 1010    Specimen: Tissue from Leg, Left Updated: 01/10/22 1039     Case Report --     Surgical Pathology Report                         Case: [id number]                                  Authorizing Provider:  Enrique Jain MD         Collected:           01/06/2022 10:10 AM          Ordering Location:     [facility]  Received:            01/06/2022 10:52 AM                                 MAIN OR                                                                      Pathologist:           Naeem Mcdaniel MD                                                         Specimen:    Leg, Left, LEFT BELOW KNEE AMPUTATION                                                       Final Diagnosis --     1. Leg, Left, Below-the-Knee Amputation:  Benign below-the-knee amputation of the left leg with    A. Viable tissue at the surgical margin.   B. Peripheral vascular disease.   C. Ulceration.   D. Cellulitis.   E. No definitive acute or chronic osteomyelitis.    John/kds       Gross Description --     1.  Received fresh labeled " "\"left below the knee amputation\" is a left below the knee amputation with attached foot with digits 1, 4 and 5.  The amputation sites of digits 2 and 3 are well healed.  There is no toenail associated with digit 5.  Digits 1 and 4 have attached toenails.  On the volar surface of the great toe is a 4.0 x 1.5 cm ulcerative lesion.  The skin, soft tissue and bone margins are grossly viable.  The remainder of the specimen is has the following dimensions:  Exposed tibia - 2.5 cm, exposed fibula - 8 cm, margin of resection to heel - 43 cm, heel to great toe - 24.5 cm.  The dorsalis pedis is dissected to reveal a pin point lumen with no gross evidence of atherosclerosis.  The posterior tibial neurovascular bundle is dissected to reveal a pin point lumen with no gross evidence of atherosclerosis.  The anterior tibial neurovascular bundle is dissected to reveal 50% stenosis with atherosclerosis.  Sectioning through the distal phalangeal bone of the great toe subjacent to the ulcerative lesion reveals a tan yellow trabecular bone.  Representative sections are submitted as follows:   1A - proximal marrow from the tibia.  1B - proximal skin and soft tissue margins en face.  1C - dorsalis pedis.  1D - anterior tibial neurovascular bundle with atherosclerosis.  1E - posterior tibial neurovascular bundle.   1F - ulcerative wound, volar surface, great toe.  1G - phalangeal bone subjacent to the ulcerative wound on the great toe following decalcification.    jap/uso/mec/brb      CBC & Differential [[id number]]  (Abnormal) Collected: 01/10/22 0921    Specimen: Blood Updated: 01/10/22 0928    Narrative:      The following orders were created for panel order CBC & Differential.  Procedure                               Abnormality         Status                     ---------                               -----------         ------                     CBC Auto Differential[[id number]]        Abnormal            Final result             "     Please view results for these tests on the individual orders.    CBC Auto Differential [[id number]]  (Abnormal) Collected: 01/10/22 0921    Specimen: Blood Updated: 01/10/22 0928     WBC 11.54 10*3/mm3      RBC 4.36 10*6/mm3      Hemoglobin 12.2 g/dL      Hematocrit 36.0 %      MCV 82.6 fL      MCH 28.0 pg      MCHC 33.9 g/dL      RDW 13.8 %      RDW-SD 40.5 fl      MPV 9.6 fL      Platelets 244 10*3/mm3      Neutrophil % 69.6 %      Lymphocyte % 15.5 %      Monocyte % 9.4 %      Eosinophil % 4.7 %      Basophil % 0.5 %      Immature Grans % 0.3 %      Neutrophils, Absolute 8.03 10*3/mm3      Lymphocytes, Absolute 1.79 10*3/mm3      Monocytes, Absolute 1.08 10*3/mm3      Eosinophils, Absolute 0.54 10*3/mm3      Basophils, Absolute 0.06 10*3/mm3      Immature Grans, Absolute 0.04 10*3/mm3      nRBC 0.0 /100 WBC     POC Glucose Once [[id number]]  (Abnormal) Collected: 01/10/22 0634    Specimen: Blood Updated: 01/10/22 0636     Glucose 143 mg/dL      Comment: Meter: [id number] : [id number] Bryan ARMIJO       Blood Culture - Blood, Arm, Left [[id number]]  (Normal) Collected: 22 0221    Specimen: Blood from Arm, Left Updated: 01/10/22 0230     Blood Culture No growth at 2 days    POC Glucose Once [[id number]]  (Abnormal) Collected: 22 2153    Specimen: Blood Updated: 22 2154     Glucose 270 mg/dL      Comment: Meter: [id number] : [id number] Fultondavie Novak NA       POC Glucose Once [[id number]]  (Abnormal) Collected: 22 1617    Specimen: Blood Updated: 22 1621     Glucose 140 mg/dL      Comment: Meter: [id number] : [id number] Mo Alaniz RN               Assessment:  Montez Guy is a 51 y.o. male Hospital day 5, postoperative day #4 status post left below-knee amputation.        Medical decision-makin.  Pain: Continue current regimen.  2.  Slight increase in white blood cell count we will continue to follow, CBC in the morning.  3.  Medical management per the  hospitalist service, appreciate their input.  4.  Continue low molecular weight heparin for DVT prophylaxis  5.  Continue to follow blood cultures  6.  Continue with physical and Occupational Therapy  7.  Continue elevation and ice for pain control  8.  Dressing changed at this morning  9.  Had a discussion with the patient in regards to the findings of the medial aspect of the wound.  The area is small at this time.  Explained that we will follow and that there may be a need for excision should the area be deemed nonviable  10.  We will start IV fluids to help with the patient's orthostasis.    11.  Will prescribe Phenergan as needed to help with the nausea and vomiting.    Enrique Jain M.D.  1/10/2022

## 2022-01-11 VITALS
DIASTOLIC BLOOD PRESSURE: 76 MMHG | OXYGEN SATURATION: 97 % | WEIGHT: 258 LBS | HEIGHT: 72 IN | SYSTOLIC BLOOD PRESSURE: 128 MMHG | RESPIRATION RATE: 16 BRPM | HEART RATE: 80 BPM | TEMPERATURE: 97.6 F | BODY MASS INDEX: 34.95 KG/M2

## 2022-01-11 LAB
GLUCOSE BLDC GLUCOMTR-MCNC: 119 MG/DL (ref 70–130)
GLUCOSE BLDC GLUCOMTR-MCNC: 162 MG/DL (ref 70–130)

## 2022-01-11 PROCEDURE — 97530 THERAPEUTIC ACTIVITIES: CPT

## 2022-01-11 PROCEDURE — 82962 GLUCOSE BLOOD TEST: CPT

## 2022-01-11 PROCEDURE — 63710000001 INSULIN LISPRO (HUMAN) PER 5 UNITS: Performed by: INTERNAL MEDICINE

## 2022-01-11 PROCEDURE — 25010000002 ENOXAPARIN PER 10 MG: Performed by: ORTHOPAEDIC SURGERY

## 2022-01-11 RX ORDER — MORPHINE SULFATE 15 MG/1
15 TABLET, FILM COATED, EXTENDED RELEASE ORAL EVERY 12 HOURS SCHEDULED
Qty: 10 TABLET | Refills: 0 | Status: SHIPPED | OUTPATIENT
Start: 2022-01-11 | End: 2022-01-11 | Stop reason: SDUPTHER

## 2022-01-11 RX ORDER — OXYCODONE HYDROCHLORIDE 5 MG/1
5-10 TABLET ORAL EVERY 4 HOURS PRN
Qty: 60 TABLET | Refills: 0 | Status: SHIPPED | OUTPATIENT
Start: 2022-01-11

## 2022-01-11 RX ORDER — MORPHINE SULFATE 15 MG/1
15 TABLET, FILM COATED, EXTENDED RELEASE ORAL EVERY 12 HOURS SCHEDULED
Qty: 10 TABLET | Refills: 0 | Status: SHIPPED | OUTPATIENT
Start: 2022-01-11 | End: 2022-01-14

## 2022-01-11 RX ORDER — ONDANSETRON 4 MG/1
4 TABLET, FILM COATED ORAL EVERY 6 HOURS PRN
Qty: 30 TABLET | Refills: 0 | Status: SHIPPED | OUTPATIENT
Start: 2022-01-11

## 2022-01-11 RX ORDER — NALOXONE HCL 0.4 MG/ML
0.4 VIAL (ML) INJECTION
Start: 2022-01-11

## 2022-01-11 RX ORDER — POLYETHYLENE GLYCOL 3350 17 G/17G
17 POWDER, FOR SOLUTION ORAL DAILY PRN
Start: 2022-01-11

## 2022-01-11 RX ORDER — ACETAMINOPHEN 500 MG
1000 TABLET ORAL EVERY 8 HOURS PRN
Qty: 90 TABLET | Refills: 0 | Status: SHIPPED | OUTPATIENT
Start: 2022-01-11

## 2022-01-11 RX ORDER — BISACODYL 5 MG/1
5 TABLET, DELAYED RELEASE ORAL DAILY PRN
Start: 2022-01-11

## 2022-01-11 RX ADMIN — OXYCODONE HYDROCHLORIDE 10 MG: 5 TABLET ORAL at 12:24

## 2022-01-11 RX ADMIN — OXYCODONE HYDROCHLORIDE 10 MG: 5 TABLET ORAL at 06:13

## 2022-01-11 RX ADMIN — PREGABALIN 50 MG: 25 CAPSULE ORAL at 09:47

## 2022-01-11 RX ADMIN — FAMOTIDINE 40 MG: 20 TABLET, FILM COATED ORAL at 09:47

## 2022-01-11 RX ADMIN — ROSUVASTATIN CALCIUM 40 MG: 40 TABLET, FILM COATED ORAL at 09:49

## 2022-01-11 RX ADMIN — ENOXAPARIN SODIUM 40 MG: 40 INJECTION SUBCUTANEOUS at 00:39

## 2022-01-11 RX ADMIN — OXYCODONE HYDROCHLORIDE 10 MG: 5 TABLET ORAL at 02:36

## 2022-01-11 RX ADMIN — SODIUM CHLORIDE 75 ML/HR: 9 INJECTION, SOLUTION INTRAVENOUS at 00:39

## 2022-01-11 RX ADMIN — MODAFINIL 100 MG: 100 TABLET ORAL at 09:49

## 2022-01-11 RX ADMIN — LOSARTAN POTASSIUM 50 MG: 50 TABLET ORAL at 09:49

## 2022-01-11 RX ADMIN — INSULIN LISPRO 2 UNITS: 100 INJECTION, SOLUTION INTRAVENOUS; SUBCUTANEOUS at 12:24

## 2022-01-11 RX ADMIN — CHOLECALCIFEROL TAB 10 MCG (400 UNIT) 400 UNITS: 10 TAB at 09:49

## 2022-01-11 RX ADMIN — LAMOTRIGINE 25 MG: 25 TABLET ORAL at 09:49

## 2022-01-11 RX ADMIN — MORPHINE SULFATE 15 MG: 15 TABLET, FILM COATED, EXTENDED RELEASE ORAL at 09:47

## 2022-01-11 NOTE — DISCHARGE SUMMARY
Date of Discharge:  1/11/2022    Discharge Diagnosis: Chronic Diabetic foot wounds  Diabetes Mellitus  Hypertension  Acute Blood loss Anemia    Presenting Problem/History of Present Illness  Active Hospital Problems    Diagnosis  POA   • **Osteomyelitis of great toe of left foot (HCC) [M86.9]  Yes   • WEN (generalized anxiety disorder) [F41.1]  Yes   • Type 2 diabetes mellitus with diabetic polyneuropathy, without long-term current use of insulin (HCC) [E11.42]  Yes   • Obstructive sleep apnea syndrome [G47.33]  Yes   • Obesity (BMI 30-39.9) [E66.9]  Yes   • Benign essential hypertension [I10]  Yes   • Hypercholesterolemia [E78.00]  Yes      Resolved Hospital Problems   No resolved problems to display.     Hospital Course  Patient is a 51 y.o. male presented with left foot chronic diabetic wounds.  Presented on DOA for surgical intervention.  Underwent L BKA without difficulty.  Pain was managed initially with IV and oral medications and subsequently transitioned to oral on on HD 4.  On HD 3 he developed a fever no further febrile episodes noted.  Throughout his hospitalization he was evaluated by PT/OT and the hospitalist team for medical management.  On HD#5 he did have a bowel movement.  He also experience nausea and emesis.  IVF were restarted on HD 5 and medications adjusted.    At the time of discharge he was afebrile with stable VS.  His pain was well controlled on oral pain medications.  He had normal bladder function.  His operative site was swollen and with minimal serous drainage, medially there is a small dark area suggestive of decreased blood flow which we will monitor.      Procedures Performed    Procedure(s):  1/6/2022:  LEFT BELOW KNEE AMPUTATION  -------------------       Consults:   Consults     Date and Time Order Name Status Description    1/6/2022  1:45 PM Inpatient Hospitalist Consult Completed       Case Management  Physical and Occupational therapy    Pertinent Test Results: Blood  cultures:  Negative       Vital Signs  Temp:  [97.3 °F (36.3 °C)-98.2 °F (36.8 °C)] 97.6 °F (36.4 °C)  Heart Rate:  [76-82] 80  Resp:  [16] 16  BP: (128-148)/(75-89) 128/76      Discharge Disposition:  Stable to Rehab  Rehab Facility or Unit (DC - External)    Discharge Medications     Discharge Medications      New Medications      Instructions Start Date   acetaminophen 500 MG tablet  Commonly known as: TYLENOL   1,000 mg, Oral, Every 8 Hours PRN      bisacodyl 5 MG EC tablet  Commonly known as: DULCOLAX   5 mg, Oral, Daily PRN      enoxaparin 40 MG/0.4ML solution syringe  Commonly known as: LOVENOX   40 mg, Subcutaneous, Every 24 Hours   Start Date: January 12, 2022     Morphine 15 MG 12 hr tablet  Commonly known as: MS CONTIN   15 mg, Oral, Every 12 Hours Scheduled      naloxone 0.4 MG/ML injection  Commonly known as: NARCAN   0.4 mg, Intravenous, Every 5 Minutes PRN      ondansetron 4 MG tablet  Commonly known as: ZOFRAN   4 mg, Oral, Every 6 Hours PRN      oxyCODONE 5 MG immediate release tablet  Commonly known as: ROXICODONE   5-10 mg, Oral, Every 4 Hours PRN      polyethylene glycol 17 g packet  Commonly known as: MIRALAX   17 g, Oral, Daily PRN         Changes to Medications      Instructions Start Date   metFORMIN 500 MG tablet  Commonly known as: GLUCOPHAGE  What changed:   · how much to take  · additional instructions   Take 0.5 tabs po bid x 2 weeks, then start taking 1 tab po bid.         Continue These Medications      Instructions Start Date   aspirin-acetaminophen-caffeine 250-250-65 MG per tablet  Commonly known as: EXCEDRIN MIGRAINE   2 tablets, Oral, Every 6 Hours PRN      budesonide-formoterol 160-4.5 MCG/ACT inhaler  Commonly known as: SYMBICORT   1 puff, Inhalation, 2 Times Daily PRN      lamoTRIgine 25 MG tablet  Commonly known as: LaMICtal   25 mg, Oral, 2 Times Daily, For mood stabilizer      losartan 50 MG tablet  Commonly known as: COZAAR   50 mg, Oral, Daily      modafinil 100 MG  tablet  Commonly known as: PROVIGIL   100 mg, Oral, Daily, RX is for BID, pt just takes QD      Ozempic (0.25 or 0.5 MG/DOSE) 2 MG/1.5ML solution pen-injector  Generic drug: Semaglutide(0.25 or 0.5MG/DOS)   No dose, route, or frequency recorded.      rosuvastatin 40 MG tablet  Commonly known as: CRESTOR   40 mg, Oral, Daily      Trintellix 5 MG tablet  Generic drug: Vortioxetine HBr   5 mg, Oral, Daily With Breakfast      Vitamin D (Cholecalciferol) 10 MCG (400 UNIT) tablet  Commonly known as: CHOLECALCIFEROL   400 Units, Oral, Daily             Discharge Diet:   Diet Instructions     Diet:      Diet Texture / Consistency: Regular    Common Modifiers: Consistent Carbohydrate    Consistent Carbohydrate          Activity at Discharge:   Activity Instructions     Discharge Activity      1) No driving while taking narcotics.   2) Return to school / work in 3 months   3) May sponge bathe.  No showers until there is no drainage from the operative site  4) NWB LLE  5) Elevate LLE above the level of the heart when sitting and lying down.  6) Apply ice to the operative site to help with pain and swelling.  7)  Keep residual limb wrapped or  in place.      ].   Follow-up Appointments  Future Appointments   Date Time Provider Department Center   2/21/2022  1:45 PM Watson, Denna R, PA-C [facility]     Additional Instructions for the Follow-ups that You Need to Schedule     Discharge Follow-up with Specified Provider: Dr. Jain 1/25/2022 at 11:00 a.m.   As directed      To: Dr. Jain 1/25/2022 at 11:00 a.m.         Dressing Change Instructions   As directed      Dry dressing daily to LLE.  No ointments or creams to the incision.  Figure -8 wrap, or     Order Comments: Dry dressing daily to LLE.  No ointments or creams to the incision.  Figure -8 wrap, or           Notify Physician or Go To The ED For the Following Conditions   As directed      Go to your local ER with chest pain or shortness of  breath.    Call Dr. Jain at [phone] if you:    Develop a fever great than 101.  Notice increased reddness, warmth, drainage, or purulence at or from the operative site  Swelling in the LLE that does not improve with elevation  Develop pain which is not controlled with pain medications    Order Comments: Go to your local ER with chest pain or shortness of breath.  Call Dr. Jain at [phone] if you:  Develop a fever great than 101. Notice increased reddness, warmth, drainage, or purulence at or from the operative site Swelling in the LLE that does not improve with elevation Develop pain which is not controlled with pain medications                Test Results Pending at Discharge  Pending Labs     Order Current Status    Blood Culture - Blood, Arm, Left Preliminary result           Enrique Jain MD  01/11/22  13:47 EST    Time: Discharge 30 min

## 2022-01-11 NOTE — PLAN OF CARE
Goal Outcome Evaluation:  Plan of Care Reviewed With: patient        Progress: improving  Outcome Summary: POD#5 OF A LEFT BKA. DRESSING TO KNEE DRY/INTACT. VSS. PO PAIN MEDICATION HELPING WITH PAIN. VOIDING FINE PER URINAL. EDUCATION PROVIDED ON BLOOD SUGAR AND BP MONITORING WILL CONTINUE TO MONITOR.

## 2022-01-11 NOTE — CASE MANAGEMENT/SOCIAL WORK
Continued Stay Note  [facility]     Patient Name: Montez Guy  MRN: [id number]  Today's Date: 1/11/2022    Admit Date: 1/6/2022     Discharge Plan     Row Name 01/11/22 0944       Plan    Plan [facility] -- Accepted & Pre-cert Obtained.    Patient/Family in Agreement with Plan yes    Plan Comments Spoke with Jay/[facility] who has a rehab bed for the patient today. Patient will likely need transport arranged at d/c. No other needs identified.               Discharge Codes    No documentation.               Expected Discharge Date and Time     Expected Discharge Date Expected Discharge Time    Jan 11, 2022             Kavya Henson RN

## 2022-01-11 NOTE — PLAN OF CARE
Goal Outcome Evaluation:  Plan of Care Reviewed With: patient        Progress: improving  Outcome Summary: Pt shows improvement in mobility this date, able to tolerate inc'd activity d/t feeling less nausous. Pt was min/modA x2 for STS and to transfer over to bedside chair. Able to perform LE exercises when seated in chair. Ended session with OT working on ADL's. Pt continues to benefit from skilled PT.

## 2022-01-11 NOTE — PLAN OF CARE
Goal Outcome Evaluation:           Progress: improving  Outcome Summary: Pt is POD#4. PO meds for pain and nausea. Voiding per urinal. VSS. Iv fluids restarted for nausea. Plan is for rehab. Bed available. Rehab to check tomorrow. Will continue to monitor.

## 2022-01-11 NOTE — PLAN OF CARE
Goal Outcome Evaluation:  Plan of Care Reviewed With: patient        Pt discharging to rehab today in good condition.

## 2022-01-11 NOTE — CASE MANAGEMENT/SOCIAL WORK
Continued Stay Note  [facility]     Patient Name: Montez Guy  MRN: [id number]  Today's Date: 1/11/2022    Admit Date: 1/6/2022     Discharge Plan     Row Name 01/11/22 1433       Plan    Plan [facility] -- Accepted & Pre-cert Obtained.    Patient/Family in Agreement with Plan yes    Plan Comments Discharge orders noted. [facility] (spoke with [facility]; confirmation #: [id number]). [facility]as confirmed they will provide the wheelchair and an elevated left leg rest. Updated the patient, his wife/Tenisha Guido RN and Jay/[facility] who are all agreeable with the d/c plan. No other needs identified. Packet is on the chart.    Final Discharge Disposition Code 62 - inpatient rehab facility    Final Note [facility].               Discharge Codes    No documentation.               Expected Discharge Date and Time     Expected Discharge Date Expected Discharge Time    Jan 11, 2022             Kavya Henson RN

## 2022-01-11 NOTE — THERAPY TREATMENT NOTE
Patient Name: Montez Guy  : 1970    MRN: [id number]                              Today's Date: 2022       Admit Date: 2022    Visit Dx:     ICD-10-CM ICD-9-CM   1. Osteomyelitis of great toe of left foot ([facility])  M86.9 730.27   2. Open wound of fifth toe of left foot, initial encounter  S91.105A 893.0   3. Open wound of fifth toe of left foot, subsequent encounter  S91.105D V58.89     893.0   4. S/P BKA (below knee amputation), left ([facility])  Z89.512 V49.75     Patient Active Problem List   Diagnosis   • Cellulitis   • Pressure ulcer, ankle   • Benign essential hypertension   • Constipation, chronic   • Hypercholesterolemia   • Mood disorder ([facility])   • Obstructive sleep apnea syndrome   • Peyronie's disease   • Type 2 diabetes mellitus with diabetic polyneuropathy, without long-term current use of insulin ([facility])   • Vitamin D deficiency   • Open wound of fifth toe of left foot   • Obesity (BMI 30-39.9)   • Fever   • Streptococcal infection group A   • Pneumonia involving right lung   • Sepsis ([facility])   • Below-knee amputation of right lower extremity ([facility])   • MDD (major depressive disorder), recurrent episode, moderate ([facility])   • WEN (generalized anxiety disorder)   • Osteomyelitis of great toe of left foot ([facility])     Past Medical History:   Diagnosis Date   • Cellulitis of left lower extremity 2020   • Diabetes ([facility])    • History of coma     X 4 MONTHS   • History of weight loss    • Hypertension    • Impaired lung function     LEFT   • MRSA (methicillin resistant staph aureus) culture positive     RIGHT LEG- OSTEOMYELYTIS-SEPSIS-BLOOD LOSS   • Obesity (BMI 30-39.9) 2020   • Obstructive sleep apnea syndrome 2020    CPAP   • Osteomyelitis of great toe of left foot ([facility])    • Pneumonia 2014    necrotizing pneumonia     Past Surgical History:   Procedure Laterality Date   • BELOW KNEE AMPUTATION Left 2022    Procedure: LEFT BELOW KNEE AMPUTATION;  Surgeon: Enrique Jain MD;  Location:  [facility] MAIN OR;  Service: Orthopedics;  Laterality: Left;   • BELOW KNEE LEG AMPUTATION Right    • DEBRIDEMENT LEG Right     MULTIPLE   • FLEXIBLE BRONCHOSCOPY W/ BRONCHOPULMONARY LAVAGE      MULTIPLE   • PILONIDAL CYSTECTOMY     • RHINOPLASTY     • TOE AMPUTATION     • TRACHEOSTOMY     • TRACHEOSTOMY CLOSURE/STOMA REVISION        General Information     [facility] Name 01/11/22 1242          OT Time and Intention    Document Type therapy note (daily note)  -     Mode of Treatment occupational therapy; physical therapy; co-treatment  -[facility] Name 01/11/22 1242          General Information    Patient Profile Reviewed yes  -     Existing Precautions/Restrictions fall; brace worn when out of bed  new L BKA  -[facility] Name 01/11/22 1242          Cognition    Orientation Status (Cognition) oriented x 4  -[facility] Name 01/11/22 1242          Safety Issues, Functional Mobility    Impairments Affecting Function (Mobility) pain; strength; balance; endurance/activity tolerance  -           User Key  (r) = Recorded By, (t) = Taken By, (c) = Cosigned By    Initials Name Provider Type     Marybeth Rushing, GUZMAN Occupational Therapist                 Mobility/ADL's     [facility] Name 01/11/22 1242          Bed Mobility    Supine-Sit [place] (Bed Mobility) standby assist; verbal cues  -[facility] Name 01/11/22 1242          Transfers    Sit-Stand [place] (Transfers) minimum assist (75% patient effort); moderate assist (50% patient effort); 2 person assist; verbal cues  -[facility] Name 01/11/22 1242          Sit-Stand Transfer    Assistive Device (Sit-Stand Transfers) walker, front-wheeled  -[facility] Name 01/11/22 1242          Functional Mobility    Functional Mobility- Ind. Level minimum assist (75% patient effort); 2 person assist required; verbal cues required  -     Functional Mobility- Device rolling walker  -     Functional Mobility-Distance (Feet) 3  -     Functional Mobility- Comment pt able to hop on  prothesis for pivot transfer to chair  -     Row Name 01/11/22 1242          Activities of Daily Living    BADL Assessment/Intervention bathing  -     Row Name 01/11/22 1242          Lower Body Dressing Assessment/Training    Comment (Lower Body Dressing) Pt dons R prothesis SBA EOB  -     Row Name 01/11/22 1242          Bathing Assessment/Intervention    [place] Level (Bathing) bathing skills; standby assist  -     Position (Bathing) supported sitting  -     Comment (Bathing) OT educated pt with weight shifting side to side to wash francis area. Washes UB SBA.  -           User Key  (r) = Recorded By, (t) = Taken By, (c) = Cosigned By    Initials Name Provider Type    Marybeth Saxena OT Occupational Therapist               Obj/Interventions     Row Name 01/11/22 1243          Balance    Static Sitting Balance WFL; sitting, edge of bed  -     Static Standing Balance mild impairment; supported  -     Comment, Balance CGA/Min A standing at rwx  -           User Key  (r) = Recorded By, (t) = Taken By, (c) = Cosigned By    Initials Name Provider Type    Marybeth Saxena OT Occupational Therapist               Goals/Plan    No documentation.                Clinical Impression     Row Name 01/11/22 1244          Pain Scale: Numbers Pre/Post-Treatment    Pretreatment Pain Rating 5/10  -SM     Posttreatment Pain Rating 5/10  -     Pain Location - Side Left  -     Pain Location residual limb  -     Row Name 01/11/22 1244          Plan of Care Review    Plan of Care Reviewed With patient  -     Outcome Summary Pt seen today by OT, co treat with PT. Pt was able to progress to pivot transfer and able to take a few hopping steps to chair today with min/mod AX2. He is overall feeling much better with nausea today and reports pain 5/10. Pt was able to complete bathing ADL seated in chair rocking side to side to wash francis area and UB ADLs all SBA. Plans to dc to acute rehab soon.  -     Michael  Name 01/11/22 1244          Therapy Plan Review/Discharge Plan (OT)    Anticipated Discharge Disposition (OT) inpatient rehabilitation facility  -     Row Name 01/11/22 1244          Positioning and Restraints    Pre-Treatment Position in bed  -SM     Post Treatment Position chair  -SM     In Chair reclined; call light within reach; encouraged to call for assist; exit alarm on; notified nsg  -           User Key  (r) = Recorded By, (t) = Taken By, (c) = Cosigned By    Initials Name Provider Type    Marybeth Saxena, OT Occupational Therapist               Outcome Measures     Row Name 01/11/22 1246          How much help from another is currently needed...    Putting on and taking off regular lower body clothing? 2  -SM     Bathing (including washing, rinsing, and drying) 3  -SM     Toileting (which includes using toilet bed pan or urinal) 2  -SM     Putting on and taking off regular upper body clothing 3  -SM     Taking care of personal grooming (such as brushing teeth) 3  -SM     Eating meals 4  -SM     AM-PAC 6 Clicks Score (OT) 17  -SM     Row Name 01/11/22 1156          How much help from another person do you currently need...    Turning from your back to your side while in flat bed without using bedrails? 3  -DB     Moving from lying on back to sitting on the side of a flat bed without bedrails? 3  -DB     Moving to and from a bed to a chair (including a wheelchair)? 2  -DB     Standing up from a chair using your arms (e.g., wheelchair, bedside chair)? 2  -DB     Climbing 3-5 steps with a railing? 1  -DB     To walk in hospital room? 2  -DB     AM-PAC 6 Clicks Score (PT) 13  -DB     Row Name 01/11/22 1246 01/11/22 1156       Functional Assessment    Outcome Measure Options AM-PAC 6 Clicks Daily Activity (OT)  -SM AM-PAC 6 Clicks Basic Mobility (PT)  -DB          User Key  (r) = Recorded By, (t) = Taken By, (c) = Cosigned By    Initials Name Provider Type    Nanci Bose, PT Physical Therapist     Marybeth Saxena OT Occupational Therapist                Occupational Therapy Education                 Title: PT OT SLP Therapies (In Progress)     Topic: Occupational Therapy (In Progress)     Point: ADL training (Done)     Description:   Instruct learner(s) on proper safety adaptation and remediation techniques during self care or transfers.   Instruct in proper use of assistive devices.              Learning Progress Summary           Patient Acceptance, E, VU by  at 1/7/2022 6701    Comment: Role of OT and POC/goals.                   Point: Home exercise program (Not Started)     Description:   Instruct learner(s) on appropriate technique for monitoring, assisting and/or progressing therapeutic exercises/activities.              Learner Progress:  Not documented in this visit.          Point: Precautions (Not Started)     Description:   Instruct learner(s) on prescribed precautions during self-care and functional transfers.              Learner Progress:  Not documented in this visit.          Point: Body mechanics (Not Started)     Description:   Instruct learner(s) on proper positioning and spine alignment during self-care, functional mobility activities and/or exercises.              Learner Progress:  Not documented in this visit.                      User Key     Initials Effective Dates Name Provider Type Discipline     04/02/20 -  Marybeth Rushing OT Occupational Therapist OT              OT Recommendation and Plan  Therapy Frequency (OT): 5 times/wk  Plan of Care Review  Plan of Care Reviewed With: patient  Outcome Summary: Pt seen today by OT, co treat with PT. Pt was able to progress to pivot transfer and able to take a few hopping steps to chair today with min/mod AX2. He is overall feeling much better with nausea today and reports pain 5/10. Pt was able to complete bathing ADL seated in chair rocking side to side to wash francis area and UB ADLs all SBA. Plans to dc to acute rehab  soon.     Time Calculation:    Time Calculation- OT     Row Name 01/11/22 1247             Time Calculation- OT    OT Start Time 1036  -SM      OT Stop Time 1053  -SM      OT Time Calculation (min) 17 min  -SM      Total Timed Code Minutes- OT 17 minute(s)  -SM              Timed Charges    43179 - OT Therapeutic Activity Minutes 17  -SM              Total Minutes    Timed Charges Total Minutes 17  -SM       Total Minutes 17  -SM            User Key  (r) = Recorded By, (t) = Taken By, (c) = Cosigned By    Initials Name Provider Type     Marybeth Rushing OT Occupational Therapist              Therapy Charges for Today     Code Description Service Date Service Provider Modifiers Qty    34335315367 HC OT THERAPEUTIC ACT EA 15 MIN 1/10/2022 Marybeth Rushing OT GO 2    26904688357 HC OT THERAPEUTIC ACT EA 15 MIN 1/11/2022 Marybeth Rushing OT GO 1               Marybeth Rushing OT  1/11/2022

## 2022-01-11 NOTE — THERAPY TREATMENT NOTE
Patient Name: Montez Guy  : 1970    MRN: [id number]                              Today's Date: 2022       Admit Date: 2022    Visit Dx:     ICD-10-CM ICD-9-CM   1. Osteomyelitis of great toe of left foot ([facility])  M86.9 730.27   2. Open wound of fifth toe of left foot, initial encounter  S91.105A 893.0   3. Open wound of fifth toe of left foot, subsequent encounter  S91.105D V58.89     893.0   4. S/P BKA (below knee amputation), left ([facility])  Z89.512 V49.75     Patient Active Problem List   Diagnosis   • Cellulitis   • Pressure ulcer, ankle   • Benign essential hypertension   • Constipation, chronic   • Hypercholesterolemia   • Mood disorder ([facility])   • Obstructive sleep apnea syndrome   • Peyronie's disease   • Type 2 diabetes mellitus with diabetic polyneuropathy, without long-term current use of insulin ([facility])   • Vitamin D deficiency   • Open wound of fifth toe of left foot   • Obesity (BMI 30-39.9)   • Fever   • Streptococcal infection group A   • Pneumonia involving right lung   • Sepsis ([facility])   • Below-knee amputation of right lower extremity ([facility])   • MDD (major depressive disorder), recurrent episode, moderate ([facility])   • WEN (generalized anxiety disorder)   • Osteomyelitis of great toe of left foot ([facility])     Past Medical History:   Diagnosis Date   • Cellulitis of left lower extremity 2020   • Diabetes ([facility])    • History of coma     X 4 MONTHS   • History of weight loss    • Hypertension    • Impaired lung function     LEFT   • MRSA (methicillin resistant staph aureus) culture positive     RIGHT LEG- OSTEOMYELYTIS-SEPSIS-BLOOD LOSS   • Obesity (BMI 30-39.9) 2020   • Obstructive sleep apnea syndrome 2020    CPAP   • Osteomyelitis of great toe of left foot ([facility])    • Pneumonia 2014    necrotizing pneumonia     Past Surgical History:   Procedure Laterality Date   • BELOW KNEE AMPUTATION Left 2022    Procedure: LEFT BELOW KNEE AMPUTATION;  Surgeon: Enrique Jain MD;  Location:  [facility] MAIN OR;  Service: Orthopedics;  Laterality: Left;   • BELOW KNEE LEG AMPUTATION Right    • DEBRIDEMENT LEG Right     MULTIPLE   • FLEXIBLE BRONCHOSCOPY W/ BRONCHOPULMONARY LAVAGE      MULTIPLE   • PILONIDAL CYSTECTOMY     • RHINOPLASTY     • TOE AMPUTATION     • TRACHEOSTOMY     • TRACHEOSTOMY CLOSURE/STOMA REVISION        General Information     Row Name 01/11/22 1151          Physical Therapy Time and Intention    Document Type therapy note (daily note)  -DB     Mode of Treatment physical therapy; co-treatment; occupational therapy  -DB     Row Name 01/11/22 1151          General Information    Patient Profile Reviewed yes  -DB           User Key  (r) = Recorded By, (t) = Taken By, (c) = Cosigned By    Initials Name Provider Type    DB Nanci Vasquez PT Physical Therapist               Mobility     Row Name 01/11/22 1151          Bed Mobility    Comment (Bed Mobility) pt sitting EOB when PT arrives to session  -DB     Row Name 01/11/22 1151          Sit-Stand Transfer    Sit-Stand Warren (Transfers) minimum assist (75% patient effort); moderate assist (50% patient effort); 2 person assist; verbal cues  -DB     Assistive Device (Sit-Stand Transfers) walker, front-wheeled  -DB     Row Name 01/11/22 1151          Gait/Stairs (Locomotion)    Warren Level (Gait) minimum assist (75% patient effort); moderate assist (50% patient effort); 2 person assist; verbal cues; nonverbal cues (demo/gesture)  -DB     Distance in Feet (Gait) 5' over to the chair  -DB     Deviations/Abnormal Patterns (Gait) gait speed decreased; stride length decreased; base of support, narrow  -DB     Bilateral Gait Deviations forward flexed posture  -DB           User Key  (r) = Recorded By, (t) = Taken By, (c) = Cosigned By    Initials Name Provider Type    DB Nanci Vasquez PT Physical Therapist               Obj/Interventions     Row Name 01/11/22 1153          Motor Skills    Therapeutic Exercise other (see comments)   seated MIP BLE, LAQ RLE x 10  -DB     Row Name 01/11/22 1153          Balance    Balance Assessment sitting static balance; sitting dynamic balance; standing static balance; standing dynamic balance  -DB     Static Sitting Balance WFL; sitting, edge of bed  -DB     Dynamic Sitting Balance WFL; sitting, edge of bed  -DB     Static Standing Balance mild impairment; supported  -DB     Dynamic Standing Balance mild impairment  -DB     Balance Interventions sitting; standing; sit to stand  -DB           User Key  (r) = Recorded By, (t) = Taken By, (c) = Cosigned By    Initials Name Provider Type    DB Nanci Vasquez, PT Physical Therapist               Goals/Plan    No documentation.                Clinical Impression     Row Name 01/11/22 1154          Plan of Care Review    Plan of Care Reviewed With patient  -DB     Progress improving  -DB     Outcome Summary Pt shows improvement in mobility this date, able to tolerate inc'd activity d/t feeling less nausous. Pt was min/modA x2 for STS and to transfer over to bedside chair. Able to perform LE exercises when seated in chair. Ended session with OT working on ADL's. Pt continues to benefit from skilled PT.  -DB     Row Name 01/11/22 1154          Vital Signs    O2 Delivery Pre Treatment room air  -DB     O2 Delivery Intra Treatment room air  -DB     O2 Delivery Post Treatment room air  -DB     Pre Patient Position Supine  -DB     Intra Patient Position Standing  -DB     Post Patient Position Sitting  -DB     Row Name 01/11/22 1154          Positioning and Restraints    Pre-Treatment Position in bed  -DB     Post Treatment Position chair  -DB     In Chair sitting; call light within reach; encouraged to call for assist; exit alarm on; with OT  -DB           User Key  (r) = Recorded By, (t) = Taken By, (c) = Cosigned By    Initials Name Provider Type    DB Nanci Vasquez, PT Physical Therapist               Outcome Measures     Row Name 01/11/22 1156          How much  help from another person do you currently need...    Turning from your back to your side while in flat bed without using bedrails? 3  -DB     Moving from lying on back to sitting on the side of a flat bed without bedrails? 3  -DB     Moving to and from a bed to a chair (including a wheelchair)? 2  -DB     Standing up from a chair using your arms (e.g., wheelchair, bedside chair)? 2  -DB     Climbing 3-5 steps with a railing? 1  -DB     To walk in hospital room? 2  -DB     AM-PAC 6 Clicks Score (PT) 13  -DB     Row Name 01/11/22 1156          Functional Assessment    Outcome Measure Options AM-PAC 6 Clicks Basic Mobility (PT)  -DB           User Key  (r) = Recorded By, (t) = Taken By, (c) = Cosigned By    Initials Name Provider Type    Nanci Bose, PT Physical Therapist                             Physical Therapy Education                 Title: PT OT SLP Therapies (In Progress)     Topic: Physical Therapy (Done)     Point: Mobility training (Done)     Learning Progress Summary           Patient Acceptance, E, VU by DB at 1/11/2022 1157    Acceptance, E, VU by DB at 1/10/2022 1204    Acceptance, E, VU by KH at 1/9/2022 1321    Acceptance, E, VU by KH at 1/8/2022 1532    Acceptance, E,D, VU,NR by MS at 1/7/2022 0925                   Point: Home exercise program (Done)     Learning Progress Summary           Patient Acceptance, E, VU by DB at 1/11/2022 1157    Acceptance, E, VU by DB at 1/10/2022 1204    Acceptance, E, VU by KH at 1/9/2022 1321    Acceptance, E, VU by KH at 1/8/2022 1532    Acceptance, E,D, VU,NR by MS at 1/7/2022 0925                   Point: Body mechanics (Done)     Learning Progress Summary           Patient Acceptance, E, VU by DB at 1/11/2022 1157    Acceptance, E, VU by DB at 1/10/2022 1204    Acceptance, E, VU by KH at 1/9/2022 1321    Acceptance, E, VU by KH at 1/8/2022 1532    Acceptance, E,D, VU,NR by MS at 1/7/2022 0925                   Point: Precautions (Done)     Learning  Progress Summary           Patient Acceptance, E, VU by DB at 1/11/2022 1157    Acceptance, E, VU by DB at 1/10/2022 1204    Acceptance, E, VU by  at 1/9/2022 1321    Acceptance, E, VU by  at 1/8/2022 1532    Acceptance, E,D, VU,NR by MS at 1/7/2022 0925                               User Key     Initials Effective Dates Name Provider Type Discipline     06/16/21 -  Lynette Hinson, PT Physical Therapist PT    MS 06/16/21 -  Edi Anthony PT Physical Therapist PT    DB 06/16/21 -  Nanci Vasquez, PT Physical Therapist PT              PT Recommendation and Plan     Plan of Care Reviewed With: patient  Progress: improving  Outcome Summary: Pt shows improvement in mobility this date, able to tolerate inc'd activity d/t feeling less nausous. Pt was min/modA x2 for STS and to transfer over to bedside chair. Able to perform LE exercises when seated in chair. Ended session with OT working on ADL's. Pt continues to benefit from skilled PT.     Time Calculation:    PT Charges     Row Name 01/11/22 1157             Time Calculation    Start Time 1039  -DB      Stop Time 1049  -DB      Time Calculation (min) 10 min  -DB      PT Received On 01/11/22  -DB      PT - Next Appointment 01/12/22  -DB              Time Calculation- PT    Total Timed Code Minutes- PT 10 minute(s)  -DB            User Key  (r) = Recorded By, (t) = Taken By, (c) = Cosigned By    Initials Name Provider Type    DB Nanci Vasquez, PT Physical Therapist              Therapy Charges for Today     Code Description Service Date Service Provider Modifiers Qty    29179749996 HC PT THERAPEUTIC ACT EA 15 MIN 1/10/2022 Nanci Vasquez, PT GP 2    74017554556 HC PT THERAPEUTIC ACT EA 15 MIN 1/11/2022 Nanci Vasquez, PT GP 1          PT G-Codes  Outcome Measure Options: AM-PAC 6 Clicks Basic Mobility (PT)  AM-PAC 6 Clicks Score (PT): 13  AM-PAC 6 Clicks Score (OT): 16    Nanci aVsquez PT  1/11/2022

## 2022-01-11 NOTE — PLAN OF CARE
Goal Outcome Evaluation:  Plan of Care Reviewed With: patient           Outcome Summary: Pt seen today by OT, co treat with PT. Pt was able to progress to pivot transfer and able to take a few hopping steps to chair today with min/mod AX2. He is overall feeling much better with nausea today and reports pain 5/10. Pt was able to complete bathing ADL seated in chair rocking side to side to wash francis area and UB ADLs all SBA. Plans to dc to acute rehab soon.    Appropriate PPE worn during encounter including gloves, mask, and eye protection. Hand hygiene completed. Pt was not wearing a mask.

## 2022-01-12 LAB
BH BB BLOOD EXPIRATION DATE: NORMAL
BH BB BLOOD EXPIRATION DATE: NORMAL
BH BB BLOOD TYPE BARCODE: 5100
BH BB BLOOD TYPE BARCODE: 5100
BH BB DISPENSE STATUS: NORMAL
BH BB DISPENSE STATUS: NORMAL
BH BB PRODUCT CODE: NORMAL
BH BB PRODUCT CODE: NORMAL
BH BB UNIT NUMBER: NORMAL
BH BB UNIT NUMBER: NORMAL
CROSSMATCH INTERPRETATION: NORMAL
CROSSMATCH INTERPRETATION: NORMAL
UNIT  ABO: NORMAL
UNIT  ABO: NORMAL
UNIT  RH: NORMAL
UNIT  RH: NORMAL

## 2022-01-12 NOTE — PAYOR COMM NOTE
"DISCHARGED  REF #[id number]      Montez Boss (51 y.o. Male)             Date of Birth Social Security Number Address Home Phone MRN    1970  [address]  [facility] [phone] [id number]    Catholic Marital Status             Denominational        Admission Date Admission Type Admitting Provider Attending Provider Department, Room/Bed    1/6/22 Elective Mercedes Hernandez MD  [address], P799/1    Discharge Date Discharge Disposition Discharge Destination          1/11/2022 Rehab Facility or Unit (DC - External)              Attending Provider: (none)   Allergies: No Known Allergies    Isolation: None   Infection: None   Code Status: Prior   Advance Care Planning Activity    Ht: 182.9 cm (72\")   Wt: 117 kg (258 lb)    Admission Cmt: None   Principal Problem: Osteomyelitis of great toe of left foot (HCC) [M86.9] More...                 Active Insurance as of 1/6/2022     Primary Coverage     Payor Plan Insurance Group Employer/Plan Group    ANTHEM BLUE CROSS ANTHEM BLUE CROSS BLUE [facility] PPO [id number]     Payor Plan Address Payor Plan Phone Number Payor Plan Fax Number Effective Dates    [address] [phone]  9/6/2021 - None Entered    [facility] [id number]       Subscriber Name Subscriber Birth Date Member ID       MONTEZ BOSS 1970 [id number]                 Emergency Contacts      (Rel.) Home Phone Work Phone Mobile Phone    TETE BOSS (Spouse) [phone] -- [phone]            Discharge Order (From admission, onward)     Start     Ordered    01/11/22 1338  Discharge patient  Once        Expected Discharge Date: 01/11/22    Discharge Disposition: Rehab Facility or Unit (DC - External)    Physician of Record for Attribution - Please select from Treatment Team: MERCEDES HERNANDEZ [[id number]]    Review needed by CMO to determine Physician of Record: No       Question Answer Comment   Physician of Record for Attribution - Please " select from Treatment Team MERCEDES HERNANDEZ    Review needed by CMO to determine Physician of Record No        01/11/22 7548

## 2022-01-13 LAB — BACTERIA SPEC AEROBE CULT: NORMAL

## 2022-04-11 RX ORDER — LAMOTRIGINE 25 MG/1
TABLET ORAL
Qty: 180 TABLET | Refills: 0 | Status: SHIPPED | OUTPATIENT
Start: 2022-04-11 | End: 2022-07-24

## 2022-07-19 RX ORDER — LAMOTRIGINE 25 MG/1
TABLET ORAL
Qty: 180 TABLET | Refills: 0 | OUTPATIENT
Start: 2022-07-19

## 2022-07-24 RX ORDER — LAMOTRIGINE 25 MG/1
TABLET ORAL
Qty: 180 TABLET | Refills: 0 | Status: SHIPPED | OUTPATIENT
Start: 2022-07-24

## 2023-10-04 ENCOUNTER — OFFICE VISIT (OUTPATIENT)
Dept: FAMILY MEDICINE CLINIC | Facility: CLINIC | Age: 53
End: 2023-10-04
Payer: COMMERCIAL

## 2023-10-04 VITALS
RESPIRATION RATE: 18 BRPM | HEIGHT: 74 IN | DIASTOLIC BLOOD PRESSURE: 106 MMHG | BODY MASS INDEX: 32.98 KG/M2 | OXYGEN SATURATION: 96 % | HEART RATE: 74 BPM | WEIGHT: 257 LBS | SYSTOLIC BLOOD PRESSURE: 164 MMHG

## 2023-10-04 DIAGNOSIS — S88.112A BELOW-KNEE AMPUTATION OF LEFT LOWER EXTREMITY: Chronic | ICD-10-CM

## 2023-10-04 DIAGNOSIS — Z86.14 HISTORY OF MRSA INFECTION: ICD-10-CM

## 2023-10-04 DIAGNOSIS — G47.429 NARCOLEPSY DUE TO UNDERLYING CONDITION WITHOUT CATAPLEXY: Chronic | ICD-10-CM

## 2023-10-04 DIAGNOSIS — E11.42 TYPE 2 DIABETES MELLITUS WITH DIABETIC POLYNEUROPATHY, WITHOUT LONG-TERM CURRENT USE OF INSULIN: Chronic | ICD-10-CM

## 2023-10-04 DIAGNOSIS — G47.33 OSA (OBSTRUCTIVE SLEEP APNEA): Chronic | ICD-10-CM

## 2023-10-04 DIAGNOSIS — T87.89 NON-HEALING WOUND OF AMPUTATION STUMP: ICD-10-CM

## 2023-10-04 DIAGNOSIS — F41.1 GAD (GENERALIZED ANXIETY DISORDER): Chronic | ICD-10-CM

## 2023-10-04 DIAGNOSIS — L98.9 GENERALIZED SKIN LESIONS: ICD-10-CM

## 2023-10-04 DIAGNOSIS — I10 BENIGN ESSENTIAL HYPERTENSION: Primary | Chronic | ICD-10-CM

## 2023-10-04 DIAGNOSIS — E66.9 OBESITY (BMI 30-39.9): Chronic | ICD-10-CM

## 2023-10-04 DIAGNOSIS — L02.01 CUTANEOUS ABSCESS OF FACE: ICD-10-CM

## 2023-10-04 DIAGNOSIS — F33.1 MDD (MAJOR DEPRESSIVE DISORDER), RECURRENT EPISODE, MODERATE: Chronic | ICD-10-CM

## 2023-10-04 DIAGNOSIS — E78.00 HYPERCHOLESTEROLEMIA: Chronic | ICD-10-CM

## 2023-10-04 DIAGNOSIS — E55.9 VITAMIN D DEFICIENCY: ICD-10-CM

## 2023-10-04 DIAGNOSIS — J44.9 CHRONIC OBSTRUCTIVE PULMONARY DISEASE, UNSPECIFIED COPD TYPE: Chronic | ICD-10-CM

## 2023-10-04 DIAGNOSIS — S88.111A BELOW-KNEE AMPUTATION OF RIGHT LOWER EXTREMITY: Chronic | ICD-10-CM

## 2023-10-04 PROBLEM — R50.9 FEVER: Status: RESOLVED | Noted: 2020-04-21 | Resolved: 2023-10-04

## 2023-10-04 PROBLEM — J18.9 PNEUMONIA INVOLVING RIGHT LUNG: Status: RESOLVED | Noted: 2020-04-21 | Resolved: 2023-10-04

## 2023-10-04 PROBLEM — L03.90 CELLULITIS: Status: RESOLVED | Noted: 2020-01-16 | Resolved: 2023-10-04

## 2023-10-04 PROBLEM — A41.9 SEPSIS: Status: RESOLVED | Noted: 2020-04-21 | Resolved: 2023-10-04

## 2023-10-04 PROBLEM — B95.0 STREPTOCOCCAL INFECTION GROUP A: Status: RESOLVED | Noted: 2020-04-21 | Resolved: 2023-10-04

## 2023-10-04 PROBLEM — M86.9 OSTEOMYELITIS OF GREAT TOE OF LEFT FOOT: Status: RESOLVED | Noted: 2021-12-28 | Resolved: 2023-10-04

## 2023-10-04 PROBLEM — S91.105A OPEN WOUND OF FIFTH TOE OF LEFT FOOT: Status: RESOLVED | Noted: 2020-01-16 | Resolved: 2023-10-04

## 2023-10-04 RX ORDER — LISINOPRIL 10 MG/1
10 TABLET ORAL DAILY
Qty: 90 TABLET | Refills: 0 | Status: SHIPPED | OUTPATIENT
Start: 2023-10-04

## 2023-10-04 RX ORDER — ROSUVASTATIN CALCIUM 40 MG/1
40 TABLET, COATED ORAL DAILY
Qty: 90 TABLET | Refills: 1 | Status: SHIPPED | OUTPATIENT
Start: 2023-10-04

## 2023-10-04 RX ORDER — SULFAMETHOXAZOLE AND TRIMETHOPRIM 800; 160 MG/1; MG/1
1 TABLET ORAL 2 TIMES DAILY
Qty: 20 TABLET | Refills: 0 | Status: SHIPPED | OUTPATIENT
Start: 2023-10-04

## 2023-10-04 RX ORDER — LISINOPRIL 10 MG/1
10 TABLET ORAL DAILY
COMMUNITY
End: 2023-10-04 | Stop reason: SDUPTHER

## 2023-10-04 NOTE — PROGRESS NOTES
"Chief Complaint  Hypertension (Establish Care/Med refill/Possible MRSA on back of his neck and leg/Sleep apnea supplies order)    Subjective          Montez Guy presents to [facility] for  History of Present Illness    He is new to me here to establish care and to follow-up on hypertension, DMT2, COPD, ZENA, Anxiety/Depression, Narcolepsy and with complaint of skin lesions:    Hypertension - he was on Lisinopril 10mg daily but has not been taking it for about 1.[place]    He denies chest pain, heart palpitations, SOB.     Diabetes - in the past, he was taking Metformin 500mg BID and Ozempic 0.5mg weekly but has not been taking medication for about 1.5 years due to being out of country.    COPD - he is currently taking Symbicort BID and it helps his breathing.    General Anxiety and Major Depressive DO - he denies being on any medication for depression/anxiety.   He said he was in a really bad place in the past.  He reports \"head space is in a better place right now\".  Recommended referral to Mental Health provider and he agreed.    ZENA - he currently uses C-pap and it helps him get restful night sleep.  He is requesting medical supplies for his C-pap machine.   Recommended referral to Sleep Medicine for medical supply needs and he agreed.    Narcolepsey - he is currently taking Modafinil 100mg daily and followed by Pulmonology for Modafinil.    Vitamin D - he reports history of low vitamin D level and requesting to check Vitamin D level today.    Generalized Skin Lesions - He reports has sores on right cheek, back of neck at hairline and multiple small open lesions above right knee on lateral distal thigh where he scratched off scabs.  He reports lesions have been there for about 3 weeks.    Right skin flap (BKA) stump Wound - small open wound with cream colored base approx 5mm x 5mm over healed incision site on RBKA skin flap.    Left skin flap " "(BKA) stump Wound - small open linear callused area approx 0.5mm x 0.5mm with 0.5mm x 0.5mm surrounding erythema noted on LBKA skin flap.    MRSA - he reports having history of MRSA and that is how he wound up with bilateral BKA.      Review of Systems   Constitutional:  Negative for chills and fever.   Respiratory:  Negative for shortness of breath and wheezing.    Cardiovascular:  Negative for chest pain and palpitations.   Endocrine: Negative for polydipsia, polyphagia and polyuria.   Skin:  Positive for wound.        Have sores on right cheek, back of neck and above right knee, right stump and left knee.  Have history of MRSA.   Neurological:  Negative for dizziness and light-headedness.   Psychiatric/Behavioral:  Positive for dysphoric mood. Negative for suicidal ideas. The patient is nervous/anxious.         He reports \"head space is in a better place right now\".  Narcolepsy currently taking Modafinil.         Objective   Vital Signs:   BP (!) 164/106   Pulse 74   Resp 18   Ht 188 cm (74\")   Wt 117 kg (257 lb)   SpO2 96%   BMI 33.00 kg/m²     Physical Exam  Vitals and nursing note reviewed.   Constitutional:       General: He is not in acute distress.     Appearance: Normal appearance.   HENT:      Head: Normocephalic and atraumatic.   Eyes:      Conjunctiva/sclera: Conjunctivae normal.   Cardiovascular:      Rate and Rhythm: Normal rate and regular rhythm.      Heart sounds: Normal heart sounds. No murmur heard.  Pulmonary:      Effort: Pulmonary effort is normal. No respiratory distress.      Breath sounds: Normal breath sounds. No wheezing.   Skin:     General: Skin is warm and dry.      Findings: Erythema and lesion present.      Comments: Right cheek abscess approx 1.0cm x 1.0cm with cream colored center and erythema base.  Multiple small open lesions on posterior neck along hairline.  Cluster of small open lesions above right knee over lateral distal thigh.  Small open wound with cream colored " center approx 5mm x 5mm over healed incision site on RBKA skin flap/stump.  Small open linear callused area approx 0.5mm x 0.5mm with 0.5mm x 0.5mm surrounding erythema noted on LBKA skin flap/stump.   Neurological:      Mental Status: He is alert.        Result Review :                 Assessment and Plan    Diagnoses and all orders for this visit:    1. Benign essential hypertension (Primary)  Assessment & Plan:  Hypertension is elevated.  He has been out of the country for about 1.5 years and has not been taking his blood pressure medication.  Decrease table salt and foods high in salt.  Weight loss.  Increase activity daily.  Continue Lisinopril 10mg daily.  Discussed SE of medication.  Labs:  CBC, CMP  Blood pressure will be reassessed in 1 months.      Orders:  -     CBC & Differential  -     Comprehensive Metabolic Panel  -     lisinopril (PRINIVIL,ZESTRIL) 10 MG tablet; Take 1 tablet by mouth Daily.  Dispense: 90 tablet; Refill: 0    2. Type 2 diabetes mellitus with diabetic polyneuropathy, without long-term current use of insulin  Assessment & Plan:  Diabetes is  unknown.  Patient reports [place] out of the country [place] about 1[place]5 years and has stopped taking his DM medication (Metformin 500mg BID and Ozempic 0.5mg weekly) .   Dietary recommendations for ADA diet.  Regular aerobic exercise.  Discussed ways to avoid symptomatic hypoglycemia.  Discussed foot care.  Reminded to get yearly retinal exam.  Will check labs today CMP, A1c - will call with results and plan.  Diabetes will be reassessed  today .    Orders:  -     metFORMIN (GLUCOPHAGE) 500 MG tablet; Take 1 tablet by mouth 2 (Two) Times a Day With Meals. Take 0.5 tabs po bid x 2 weeks, then start taking 1 tab po bid.  Dispense: 60 tablet; Refill: 0  -     Comprehensive Metabolic Panel  -     Hemoglobin A1c    3. WEN (generalized anxiety disorder)  Assessment & Plan:  He denies being on any medication for depression/anxiety currently.   He said  "he was in a really bad place in the past.  He reports \"head space is in a better place right now\".  He denies SI/HI.  Recommended referral to Mental Health provider and he agreed.  Provided patient with name and phone number for Psychiatrist and advised him that he would have to make the initial call for an appointment and he understood and agreed.  Referral to Psychiatry for evaluation and treatment.    Orders:  -     Ambulatory Referral to Psychiatry    4. MDD (major depressive disorder), recurrent episode, moderate  Assessment & Plan:  He denies being on any medication for depression/anxiety currently.   He said he was in a really bad place in the past.  He reports \"head space is in a better place right now\".  He denies SI/HI.  Recommended referral to Mental Health provider and he agreed.  Provided patient with name and phone number for Psychiatrist and advised him that he would have to make the initial call for an appointment and he understood and agreed.  Referral to Psychiatry for evaluation and treatment.    Orders:  -     Ambulatory Referral to Psychiatry    5. Chronic obstructive pulmonary disease, unspecified COPD type  Assessment & Plan:  COPD is improving with treatment.  Warning signs of respiratory distress were reviewed with the patient.   Counseled to avoid exposure to cigarette smoke.  Continue current medications.  Currently taking Symbicort BID.  Will continue to monitor.    Orders:  -     Ambulatory Referral to Pulmonology    6. ZENA (obstructive sleep apnea)  Assessment & Plan:  Uses C-pap nightly - admits he gets restful night sleep.  He is requesting medical supplies for his C-pap machine.   Recommended referral to Pulmonology for medical supply needs and he agreed.    Orders:  -     Ambulatory Referral to Pulmonology    7. Narcolepsy due to underlying condition without cataplexy  Assessment & Plan:  He is followed by Pulmonology who prescribes Modafinil.    Orders:  -     Ambulatory Referral " to Pulmonology    8. Hypercholesterolemia  Comments:  Reports having elevated cholesterol in the past.  Lab:  Lipid Panel  Will call with result and plan.  Orders:  -     Lipid Panel  -     rosuvastatin (CRESTOR) 40 MG tablet; Take 1 tablet by mouth Daily.  Dispense: 90 tablet; Refill: 1    9. Vitamin D deficiency  Assessment & Plan:  He reports history of low Vitamin D.  Lab:  Vitamin D  Will continue to monitor.    Orders:  -     Vitamin D,25-Hydroxy    10. Obesity (BMI 30-39.9)  Assessment & Plan:  Patient's (Body mass index is 33 kg/m².) indicates that they are obese (BMI >30) with health conditions that include obstructive sleep apnea, hypertension, diabetes mellitus, dyslipidemias, and bilateral lower extremity BKA  . Weight is newly identified. BMI  is above average; BMI management plan is completed. We discussed low calorie, low carb based diet program, portion control, increasing exercise, and Information on healthy weight added to patient's after visit summary.   Labs:  CBC, CMP, Lipid Panel, A1c  Will continue to monitor.    Orders:  -     CBC & Differential  -     Comprehensive Metabolic Panel  -     Lipid Panel  -     Hemoglobin A1c    11. Non-healing wound of amputation stump  Assessment & Plan:  Right skin flap (BKA) Wound - small open wound with cream colored base approx 5mm x 5mm over healed incision site on RBKA skin flap.    Left skin flap (BKA) Wound - small opened linear callused area approx 0.5mm x 0.5mm with 0.5mm x 0.5mm surrounding erythema noted on LBKA skin flap.    Orders:  -     sulfamethoxazole-trimethoprim (BACTRIM DS,SEPTRA DS) 800-160 MG per tablet; Take 1 tablet by mouth 2 (Two) Times a Day.  Dispense: 20 tablet; Refill: 0  -     Ambulatory Referral to Wound Clinic    12. Cutaneous abscess of face  Assessment & Plan:  Right cheek abscess approx 1.0cm x 1.0cm with cream colored center and erythema base, possibly MRSA.  Recent travel out of the country.  RX:  Bactrim BID x 10  days  Referral Wound Care Clinic for evaluation and treatment.    Orders:  -     sulfamethoxazole-trimethoprim (BACTRIM DS,SEPTRA DS) 800-160 MG per tablet; Take 1 tablet by mouth 2 (Two) Times a Day.  Dispense: 20 tablet; Refill: 0  -     Ambulatory Referral to Wound Clinic    13. Generalized skin lesions  Assessment & Plan:  Multiple small open lesions on posterior neck at hairline; cluster of small open lesions above right knee on lateral distal thigh.  Referral to Wound Care Clinic.    Orders:  -     sulfamethoxazole-trimethoprim (BACTRIM DS,SEPTRA DS) 800-160 MG per tablet; Take 1 tablet by mouth 2 (Two) Times a Day.  Dispense: 20 tablet; Refill: 0  -     Ambulatory Referral to Wound Clinic    14. History of MRSA infection  Comments:  He reports having history of MRSA.  Orders:  -     sulfamethoxazole-trimethoprim (BACTRIM DS,SEPTRA DS) 800-160 MG per tablet; Take 1 tablet by mouth 2 (Two) Times a Day.  Dispense: 20 tablet; Refill: 0  -     Ambulatory Referral to Wound Clinic    15. Below-knee amputation of right lower extremity    16. Below-knee amputation of left lower extremity        Follow Up   Return in about 1 month (around 11/4/2023) for Recheck - Uncontrolled BP.  Patient was given instructions and counseling regarding his condition or for health maintenance advice. Please see specific information pulled into the AVS if appropriate.

## 2023-10-05 LAB
25(OH)D3+25(OH)D2 SERPL-MCNC: 27.7 NG/ML (ref 30–100)
ALBUMIN SERPL-MCNC: 4.3 G/DL (ref 3.5–5.2)
ALBUMIN/GLOB SERPL: 1.8 G/DL
ALP SERPL-CCNC: 82 U/L (ref 39–117)
ALT SERPL-CCNC: 31 U/L (ref 1–41)
AST SERPL-CCNC: 26 U/L (ref 1–40)
BASOPHILS # BLD AUTO: 0.11 10*3/MM3 (ref 0–0.2)
BASOPHILS NFR BLD AUTO: 1.4 % (ref 0–1.5)
BILIRUB SERPL-MCNC: 0.8 MG/DL (ref 0–1.2)
BUN SERPL-MCNC: 20 MG/DL (ref 6–20)
BUN/CREAT SERPL: 19.2 (ref 7–25)
CALCIUM SERPL-MCNC: 9.6 MG/DL (ref 8.6–10.5)
CHLORIDE SERPL-SCNC: 105 MMOL/L (ref 98–107)
CHOLEST SERPL-MCNC: 257 MG/DL (ref 0–200)
CO2 SERPL-SCNC: 25.6 MMOL/L (ref 22–29)
CREAT SERPL-MCNC: 1.04 MG/DL (ref 0.76–1.27)
EGFRCR SERPLBLD CKD-EPI 2021: 86.9 ML/MIN/1.73
EOSINOPHIL # BLD AUTO: 0.48 10*3/MM3 (ref 0–0.4)
EOSINOPHIL NFR BLD AUTO: 6 % (ref 0.3–6.2)
ERYTHROCYTE [DISTWIDTH] IN BLOOD BY AUTOMATED COUNT: 12.9 % (ref 12.3–15.4)
GLOBULIN SER CALC-MCNC: 2.4 GM/DL
GLUCOSE SERPL-MCNC: 143 MG/DL (ref 65–99)
HBA1C MFR BLD: 6.6 % (ref 4.8–5.6)
HCT VFR BLD AUTO: 49 % (ref 37.5–51)
HDLC SERPL-MCNC: 45 MG/DL (ref 40–60)
HGB BLD-MCNC: 16.5 G/DL (ref 13–17.7)
IMM GRANULOCYTES # BLD AUTO: 0.03 10*3/MM3 (ref 0–0.05)
IMM GRANULOCYTES NFR BLD AUTO: 0.4 % (ref 0–0.5)
LDLC SERPL CALC-MCNC: 162 MG/DL (ref 0–100)
LYMPHOCYTES # BLD AUTO: 1.59 10*3/MM3 (ref 0.7–3.1)
LYMPHOCYTES NFR BLD AUTO: 19.7 % (ref 19.6–45.3)
MCH RBC QN AUTO: 30.4 PG (ref 26.6–33)
MCHC RBC AUTO-ENTMCNC: 33.7 G/DL (ref 31.5–35.7)
MCV RBC AUTO: 90.2 FL (ref 79–97)
MONOCYTES # BLD AUTO: 0.64 10*3/MM3 (ref 0.1–0.9)
MONOCYTES NFR BLD AUTO: 7.9 % (ref 5–12)
NEUTROPHILS # BLD AUTO: 5.21 10*3/MM3 (ref 1.7–7)
NEUTROPHILS NFR BLD AUTO: 64.6 % (ref 42.7–76)
NRBC BLD AUTO-RTO: 0 /100 WBC (ref 0–0.2)
PLATELET # BLD AUTO: 179 10*3/MM3 (ref 140–450)
POTASSIUM SERPL-SCNC: 4.7 MMOL/L (ref 3.5–5.2)
PROT SERPL-MCNC: 6.7 G/DL (ref 6–8.5)
RBC # BLD AUTO: 5.43 10*6/MM3 (ref 4.14–5.8)
SODIUM SERPL-SCNC: 139 MMOL/L (ref 136–145)
TRIGL SERPL-MCNC: 269 MG/DL (ref 0–150)
VLDLC SERPL CALC-MCNC: 50 MG/DL (ref 5–40)
WBC # BLD AUTO: 8.06 10*3/MM3 (ref 3.4–10.8)

## 2023-10-27 PROBLEM — L98.9 GENERALIZED SKIN LESIONS: Status: ACTIVE | Noted: 2023-10-27

## 2023-10-27 PROBLEM — S88.112A BELOW-KNEE AMPUTATION OF LEFT LOWER EXTREMITY: Status: ACTIVE | Noted: 2023-10-27

## 2023-10-27 PROBLEM — J44.9 CHRONIC OBSTRUCTIVE PULMONARY DISEASE: Status: ACTIVE | Noted: 2023-10-27

## 2023-10-27 PROBLEM — L02.01 CUTANEOUS ABSCESS OF FACE: Status: ACTIVE | Noted: 2023-10-27

## 2023-10-27 PROBLEM — J44.1 COPD WITH EXACERBATION: Status: ACTIVE | Noted: 2023-10-27

## 2023-10-27 PROBLEM — G47.419 NARCOLEPSY: Status: ACTIVE | Noted: 2023-10-27

## 2023-10-27 PROBLEM — G47.33 OSA (OBSTRUCTIVE SLEEP APNEA): Status: ACTIVE | Noted: 2023-10-27

## 2023-10-27 PROBLEM — Z86.14 HISTORY OF MRSA INFECTION: Status: ACTIVE | Noted: 2023-10-27

## 2023-10-27 PROBLEM — T87.89 NON-HEALING WOUND OF AMPUTATION STUMP: Status: ACTIVE | Noted: 2023-10-27

## 2023-10-27 NOTE — ASSESSMENT & PLAN NOTE
Multiple small open lesions on posterior neck at hairline; cluster of small open lesions above right knee on lateral distal thigh.  Referral to Wound Care Clinic.

## 2023-10-27 NOTE — ASSESSMENT & PLAN NOTE
Right cheek abscess approx 1.0cm x 1.0cm with cream colored center and erythema base, possibly MRSA.  Recent travel out of the country.  RX:  Bactrim BID x 10 days  Referral Wound Care Clinic for evaluation and treatment.

## 2023-10-27 NOTE — ASSESSMENT & PLAN NOTE
"He denies being on any medication for depression/anxiety currently.   He said he was in a really bad place in the past.  He reports \"head space is in a better place right now\".  He denies SI/HI.  Recommended referral to Mental Health provider and he agreed.  Provided patient with name and phone number for Psychiatrist and advised him that he would have to make the initial call for an appointment and he understood and agreed.  Referral to Psychiatry for evaluation and treatment.  "

## 2023-10-27 NOTE — ASSESSMENT & PLAN NOTE
Diabetes is  unknown.  Patient reports has been [place] the [place] about [place] years and has stopped taking his DM medication (Metformin 500mg BID and Ozempic 0.5mg weekly) .   Dietary recommendations for ADA diet.  Regular aerobic exercise.  Discussed ways to avoid symptomatic hypoglycemia.  Discussed foot care.  Reminded to get yearly retinal exam.  Will check labs today CMP, A1c - will call with results and plan.  Diabetes will be reassessed  today .

## 2023-10-27 NOTE — ASSESSMENT & PLAN NOTE
COPD is improving with treatment.  Warning signs of respiratory distress were reviewed with the patient.   Counseled to avoid exposure to cigarette smoke.  Continue current medications.  Currently taking Symbicort BID.  Will continue to monitor.

## 2023-10-27 NOTE — ASSESSMENT & PLAN NOTE
Patient's (Body mass index is 33 kg/m².) indicates that they are obese (BMI >30) with health conditions that include obstructive sleep apnea, hypertension, diabetes mellitus, dyslipidemias, and bilateral lower extremity BKA  . Weight is newly identified. BMI  is above average; BMI management plan is completed. We discussed low calorie, low carb based diet program, portion control, increasing exercise, and Information on healthy weight added to patient's after visit summary.   Labs:  CBC, CMP, Lipid Panel, A1c  Will continue to monitor.

## 2023-10-27 NOTE — ASSESSMENT & PLAN NOTE
Uses C-pap nightly - admits he gets restful night sleep.  He is requesting medical supplies for his C-pap machine.   Recommended referral to Pulmonology for medical supply needs and he agreed.

## 2023-10-27 NOTE — ASSESSMENT & PLAN NOTE
Hypertension is elevated.  He has been out of the country for about 1[place]5 years and has not been taking his blood pressure medication.  Decrease table salt and foods high in salt.  Weight loss.  Increase activity daily.  Continue Lisinopril 10mg daily.  Discussed SE of medication.  Labs:  CBC, CMP  Blood pressure will be reassessed in 1 months.

## 2023-10-27 NOTE — ASSESSMENT & PLAN NOTE
"He denies being on any medication for depression/anxiety currently.   He said he was in a really bad place in the past.  He reports \"head space is in a better place right now\".  He denies SI/HI.  Recommended referral to Mental Health provider and he agreed.  Provided patient with name and phone number for Psychiatrist and advised him that he would have to make the initial call for an appointment and he understood and agreed.  Referral to Psychiatry for evaluation and treatment.  " Stable, A1c is below 6.0 for years.Focus on diet and weight loss.

## (undated) DEVICE — SUT SILK 0/0 CT2 18IN C027D

## (undated) DEVICE — PK ORTHO MAJ 40

## (undated) DEVICE — APPL CHLORAPREP HI/LITE 26ML ORNG

## (undated) DEVICE — PENCL E/S ULTRAVAC TELESCP NOSE HOLSTR 10FT

## (undated) DEVICE — SOL ISO/ALC RUB 70PCT 4OZ

## (undated) DEVICE — TRY SKINPREP DRYPREP

## (undated) DEVICE — Device

## (undated) DEVICE — T-DRAPE,EXTREMITY,STERILE: Brand: MEDLINE

## (undated) DEVICE — ANTIBACTERIAL UNDYED BRAIDED (POLYGLACTIN 910), SYNTHETIC ABSORBABLE SUTURE: Brand: COATED VICRYL

## (undated) DEVICE — PROXIMATE RH ROTATING HEAD SKIN STAPLERS (35 WIDE) CONTAINS 35 STAINLESS STEEL STAPLES: Brand: PROXIMATE

## (undated) DEVICE — GLV SURG SIGNATURE ESSENTIAL PF LTX SZ7.5

## (undated) DEVICE — SPNG GZ WOVN 4X4IN 12PLY 10/BX STRL

## (undated) DEVICE — JACKSON-PRATT 100CC BULB RESERVOIR: Brand: CARDINAL HEALTH

## (undated) DEVICE — BNDG ELAS ELITE V/CLOSE 6IN 5YD LF STRL

## (undated) DEVICE — DRAPE,U/ SHT,SPLIT,PLAS,STERIL: Brand: MEDLINE

## (undated) DEVICE — TRAP FLD MINIVAC MEGADYNE 100ML

## (undated) DEVICE — DRSNG GZ CURAD XEROFORM NONADHS 5X9IN STRL

## (undated) DEVICE — PAD,ABDOMINAL,8"X10",ST,LF: Brand: MEDLINE

## (undated) DEVICE — 3M™ IOBAN™ 2 ANTIMICROBIAL INCISE DRAPE 6650EZ: Brand: IOBAN™ 2

## (undated) DEVICE — STCKNT IMPERV 12IN STRL

## (undated) DEVICE — SUT TEVDEX 5 K61 30IN 79745

## (undated) DEVICE — BNDG ELAS CO-FLEX SLF ADHR 6IN 5YD LF STRL

## (undated) DEVICE — HEWSON SUTURE RETRIEVER: Brand: HEWSON SUTURE RETRIEVER

## (undated) DEVICE — SUT SILK 3/0 SH CR8 30IN C017D

## (undated) DEVICE — BNDG ELAS ELITE V/CLOSE 4IN 5YD LF STRL

## (undated) DEVICE — GLV SURG PREMIERPRO ORTHO LTX PF SZ8 BRN

## (undated) DEVICE — DRSNG PAD ABD 8X10IN STRL

## (undated) DEVICE — SUT SILK 3/0 TIES 18IN A184H

## (undated) DEVICE — WEBRIL* CAST PADDING: Brand: DEROYAL

## (undated) DEVICE — 450 ML BOTTLE OF 0.05% CHLORHEXIDINE GLUCONATE IN 99.95% STERILE WATER FOR IRRIGATION, USP AND APPLICATOR.: Brand: IRRISEPT ANTIMICROBIAL WOUND LAVAGE